# Patient Record
Sex: FEMALE | Race: WHITE | Employment: FULL TIME | URBAN - METROPOLITAN AREA
[De-identification: names, ages, dates, MRNs, and addresses within clinical notes are randomized per-mention and may not be internally consistent; named-entity substitution may affect disease eponyms.]

---

## 2017-01-03 ENCOUNTER — TELEPHONE (OUTPATIENT)
Dept: FAMILY MEDICINE | Facility: OTHER | Age: 42
End: 2017-01-03

## 2017-01-03 DIAGNOSIS — E03.0 CONGENITAL HYPOTHYROIDISM WITH DIFFUSE GOITER: Primary | ICD-10-CM

## 2017-01-03 NOTE — TELEPHONE ENCOUNTER
levothyroxine     Last Written Prescription Date: 10/11/2016  Last Quantity: 90, # refills: 0  Last Office Visit with G, P or [facility] prescribing provider: 11/19/2015        TSH   Date Value Ref Range Status   11/19/2015 3.60 0.40 - 4.00 mU/L Marisabel Durham MA

## 2017-01-05 RX ORDER — LEVOTHYROXINE SODIUM 100 UG/1
TABLET ORAL
Qty: 90 TABLET | OUTPATIENT
Start: 2017-01-05

## 2017-01-05 NOTE — TELEPHONE ENCOUNTER
Patient has pills through 1/10/17. Patient is due for OV and labs. Please see if patient can schedule prior to. If not, 30 day supply abdoulaye refill can be sent, but must be seen and have labs prior to further refills. Close when complete or flag for RN if abdoulaye refill is needed.    Yancy Dempsey, RN, BSN

## 2017-01-06 NOTE — TELEPHONE ENCOUNTER
Pt states she is not due for this refill yes, she is unsure why we received it. She was informed that she is due for an appt and labs. Will call back to schedule.   Thank you,  Carrie Carroll- Pt Rep.

## 2017-01-23 NOTE — PROGRESS NOTES
SUBJECTIVE:                                                    Amanda Pittman is a 41 year old female who presents to clinic today for the following health issues:      Hypothyroidism Follow-up      Since last visit, patient describes the following symptoms: Weight stable, no hair loss, no skin changes, no constipation, no loose stools   Doing well with this.  No symptoms of issues with this.    Joint Pain     Onset: three months     Description:   Location: left hip  Character: Sharp, Dull ache and Burning    Intensity: 7/10    Progression of Symptoms: worse, constant     Accompanying Signs & Symptoms:  Other symptoms: none   History:   Previous similar pain: No       Precipitating factors:   Trauma or overuse: no     Alleviating factors:  Improved by: rest/inactivity       Therapies Tried and outcome: ice, ibuprofen     Pt notes left hip pain since the beginning of November.  Will wax and wane, but never completely goes away.  Denies trauma.  Hasn't tried head.  Icing hasn't clearly helped.  Does gets some benefit from ibuprofen.  Pain is in lateral hip area, not worsening with walking.  Just gets worse with sitting.      Has had a stressful year with her mother's illness and death.        Amount of exercise or daily activities, outside of work: 2 day(s) per week    Problems taking medications regularly No    Medication side effects: none    Diet: regular (no restrictions)    Social History   Substance Use Topics     Smoking status: Never Smoker      Smokeless tobacco: Never Used      Comment: used to smoke occ     Alcohol Use: No        Problem list and histories reviewed & adjusted, as indicated.  Additional history: as documented    Current Outpatient Prescriptions   Medication Sig Dispense Refill     levothyroxine (SYNTHROID,LEVOTHROID) 100 MCG tablet Take 1 tablet (100 mcg) by mouth daily Due for labs for further refills 90 tablet 0     ibuprofen (ADVIL,MOTRIN) 800 MG tablet Take 1 tablet (800 mg) by mouth  "every 8 hours as needed for moderate pain 90 tablet 1     Recent Labs   Lab Test  11/19/15   1528  10/14/14   1533   03/31/11   0848   09/03/09   1136   LDL  71   --    --   98   --    --    HDL  48*   --    --   50   --    --    TRIG  178*   --    --   72   --    --    ALT  17   --    --    --    --    --    CR  0.75   --    --    --    --   1.02   GFRESTIMATED  85   --    --    --    --   62   GFRESTBLACK  >90   GFR Calc     --    --    --    --   75   POTASSIUM  4.5   --    --    --    --   4.3   TSH  3.60  0.92   < >   --    < >  2.72    < > = values in this interval not displayed.      BP Readings from Last 3 Encounters:   01/25/17 120/82   11/19/15 116/60   11/12/15 115/75    Wt Readings from Last 3 Encounters:   01/25/17 216 lb 6.4 oz (98.158 kg)   11/19/15 223 lb 9.6 oz (101.424 kg)   11/12/15 217 lb (98.431 kg)                  ROS:  Constitutional, HEENT, cardiovascular, pulmonary, gi and gu systems are negative, except as otherwise noted.    OBJECTIVE:                                                    /82 mmHg  Pulse 78  Temp(Src) 98.1  F (36.7  C) (Temporal)  Resp 16  Ht 5' 5.95\" (1.675 m)  Wt 216 lb 6.4 oz (98.158 kg)  BMI 34.99 kg/m2  LMP 01/20/2017  Breastfeeding? No  Body mass index is 34.99 kg/(m^2).  GENERAL: healthy, alert and no distress  NECK: no adenopathy, no asymmetry, masses, or scars and thyroid normal to palpation  RESP: lungs clear to auscultation - no rales, rhonchi or wheezes  CV: regular rate and rhythm, normal S1 S2, no S3 or S4, no murmur, click or rub, no peripheral edema and peripheral pulses strong  ABDOMEN: soft, nontender, no hepatosplenomegaly, no masses and bowel sounds normal  MS: tenderness near iliac crest.  Mild tenderness near trochanteric bursa.      Diagnostic Test Results:  Results for orders placed or performed in visit on 11/19/15   LIPID REFLEX TO DIRECT LDL PANEL   Result Value Ref Range    Cholesterol 155 <200 mg/dL    Triglycerides " "178 (H) <150 mg/dL    HDL Cholesterol 48 (L) >49 mg/dL    LDL Cholesterol Calculated 71 <100 mg/dL    Non HDL Cholesterol 107 <130 mg/dL   TSH with free T4 reflex   Result Value Ref Range    TSH 3.60 0.40 - 4.00 mU/L   Comprehensive metabolic panel   Result Value Ref Range    Sodium 139 133 - 144 mmol/L    Potassium 4.5 3.4 - 5.3 mmol/L    Chloride 104 94 - 109 mmol/L    Carbon Dioxide 27 20 - 32 mmol/L    Anion Gap 8 3 - 14 mmol/L    Glucose 95 70 - 99 mg/dL    Urea Nitrogen 13 7 - 30 mg/dL    Creatinine 0.75 0.52 - 1.04 mg/dL    GFR Estimate 85 >60 mL/min/1.7m2    GFR Estimate If Black >90   GFR Calc   >60 mL/min/1.7m2    Calcium 8.5 8.5 - 10.1 mg/dL    Bilirubin Total 0.4 0.2 - 1.3 mg/dL    Albumin 3.9 3.4 - 5.0 g/dL    Protein Total 7.2 6.8 - 8.8 g/dL    Alkaline Phosphatase 76 40 - 150 U/L    ALT 17 0 - 50 U/L    AST 22 0 - 45 U/L        ASSESSMENT/PLAN:                                                      BMI:   Estimated body mass index is 34.99 kg/(m^2) as calculated from the following:    Height as of this encounter: 5' 5.95\" (1.675 m).    Weight as of this encounter: 216 lb 6.4 oz (98.158 kg).   Weight management plan: Discussed healthy diet and exercise guidelines and patient will follow up in 12 months in clinic to re-evaluate.        ICD-10-CM    1. Acquired hypothyroidism E03.9 TSH with free T4 reflex     levothyroxine (SYNTHROID/LEVOTHROID) 100 MCG tablet   2. Lateral pain of left hip M25.552 PHYSICAL THERAPY REFERRAL   3. Chronic midline low back pain without sciatica M54.5 PHYSICAL THERAPY REFERRAL    G89.29    4. Adjustment disorder with depressed mood F43.21    5. Visit for screening mammogram Z12.31 *MA Screening Digital Bilateral     1.  Currently Controlled.  Continue current regimen.  Check labs.  Call/return if any problems or questions arise.   2,3.  Unclear what's causing her hip pain, but I suspect it's referred from her back.  Discussed PT or sports medicine referral.  " Will try PT initially.  Follow up if not improving.  4.  Still clearly grieving for her mother.  Offered medication to help deal with symptoms.  Pt declined.  Stressed importance of ensuring that she's coping well enough to do what she needs to on a daily basis.  Otherwise, need to be concerned at this point.  5.  Ordered.          Patient Instructions   Thank you for visiting [facility]    We'll let you know your lab results as soon as we can.     Try a week of high-dose ibuprofen to see if that will fix your hip.  If not, let's give PT a trial.  We can also have you visit with sports medicine or consider injections into the area.      If you feel your grieving is affecting your ability to do the things you need to, we should talk about treatments.    Please see me in 1-12  months for follow up, sooner if needed.      You are due for a screening Mammogram.  Please contact the Diagnostics Registration Department at: [phone] to schedule this appointment.       If you had imaging scheduled please refer to your radiology prep sheet.    Appointment    Date_______________     Time_____________    Day:   M TU W TH F    With____________________________    Location_________________________    If you need medication refills, please contact your pharmacy 3 days before your prescriptions runs out. If you are out of refills, your pharmacy will contact contact the clinic.    Contact us or return if questions or concerns.     -Your Care Team:  MD Janet Conti PA-C Folake Falaki, MD Anoshirvan Mazhari, MD Kelly White, CNP    General information about your clinic      Clinic hours:     Lab hours:  Phone [phone]  Monday 7:30 am-7 pm    Monday 8:30 am-6:30 pm  Tuesday-Friday 7:30 am-5 pm   Tuesday-Friday 8:30 am-4:30 pm    Pharmacy hours:  Phone [phone]  Monday 8:30 am-7pm  Tuesday-Friday 8:30am-6 pm                                       Mychart assistance  [phone]        We would like to hear from you, how was your visit today?    Shira Live  Patient Information Supervisor   Patient Care Supervisor  [facility] [place], and [facility], [place], and [facility]  [phone]           Sonny Warner MD, MD  [facility]

## 2017-01-25 ENCOUNTER — OFFICE VISIT (OUTPATIENT)
Dept: FAMILY MEDICINE | Facility: OTHER | Age: 42
End: 2017-01-25
Payer: COMMERCIAL

## 2017-01-25 VITALS
SYSTOLIC BLOOD PRESSURE: 120 MMHG | TEMPERATURE: 98.1 F | HEART RATE: 78 BPM | RESPIRATION RATE: 16 BRPM | HEIGHT: 66 IN | DIASTOLIC BLOOD PRESSURE: 82 MMHG | WEIGHT: 216.4 LBS | BODY MASS INDEX: 34.78 KG/M2

## 2017-01-25 DIAGNOSIS — G89.29 CHRONIC MIDLINE LOW BACK PAIN WITHOUT SCIATICA: ICD-10-CM

## 2017-01-25 DIAGNOSIS — E03.9 ACQUIRED HYPOTHYROIDISM: Primary | ICD-10-CM

## 2017-01-25 DIAGNOSIS — F43.21 ADJUSTMENT DISORDER WITH DEPRESSED MOOD: ICD-10-CM

## 2017-01-25 DIAGNOSIS — M25.552 LATERAL PAIN OF LEFT HIP: ICD-10-CM

## 2017-01-25 DIAGNOSIS — Z12.31 VISIT FOR SCREENING MAMMOGRAM: ICD-10-CM

## 2017-01-25 DIAGNOSIS — M54.50 CHRONIC MIDLINE LOW BACK PAIN WITHOUT SCIATICA: ICD-10-CM

## 2017-01-25 LAB — TSH SERPL DL<=0.005 MIU/L-ACNC: 1.17 MU/L (ref 0.4–4)

## 2017-01-25 PROCEDURE — 99214 OFFICE O/P EST MOD 30 MIN: CPT | Performed by: FAMILY MEDICINE

## 2017-01-25 PROCEDURE — 84443 ASSAY THYROID STIM HORMONE: CPT | Performed by: FAMILY MEDICINE

## 2017-01-25 PROCEDURE — 36415 COLL VENOUS BLD VENIPUNCTURE: CPT | Performed by: FAMILY MEDICINE

## 2017-01-25 RX ORDER — LEVOTHYROXINE SODIUM 100 UG/1
100 TABLET ORAL DAILY
Qty: 90 TABLET | Refills: 3 | Status: SHIPPED | OUTPATIENT
Start: 2017-01-25 | End: 2018-01-26

## 2017-01-25 ASSESSMENT — PAIN SCALES - GENERAL: PAINLEVEL: SEVERE PAIN (7)

## 2017-01-25 NOTE — PATIENT INSTRUCTIONS
Thank you for visiting [facility]    We'll let you know your lab results as soon as we can.     Try a week of high-dose ibuprofen to see if that will fix your hip.  If not, let's give PT a trial.  We can also have you visit with sports medicine or consider injections into the area.      If you feel your grieving is affecting your ability to do the things you need to, we should talk about treatments.    Please see me in 1-12  months for follow up, sooner if needed.      You are due for a screening Mammogram.  Please contact the Diagnostics Registration Department at: [phone] to schedule this appointment.       If you had imaging scheduled please refer to your radiology prep sheet.    Appointment    Date_______________     Time_____________    Day:   M TU W TH F    With____________________________    Location_________________________    If you need medication refills, please contact your pharmacy 3 days before your prescriptions runs out. If you are out of refills, your pharmacy will contact contact the clinic.    Contact us or return if questions or concerns.     -Your Care Team:  MD Janet Conti PA-C Folake Falaki, MD Anoshirvan Mazhari, MD Kelly White, ELIGIO    General information about your clinic      Clinic hours:     Lab hours:  Phone [phone]  Monday 7:30 am-7 pm    Monday 8:30 am-6:30 pm  Tuesday-Friday 7:30 am-5 pm   Tuesday-Friday 8:30 am-4:30 pm    Pharmacy hours:  Phone [phone]  Monday 8:30 am-7pm  Tuesday-Friday 8:30am-6 pm                                       Mychart assistance [phone]        We would like to hear from you, how was your visit today?    Shira Live  Patient Information Supervisor   Patient Care Supervisor  [place], and [facility] [place], and [facility]  [phone]

## 2017-01-25 NOTE — NURSING NOTE
"Chief Complaint   Patient presents with     Thyroid Problem     Panel Management     pap, tsh     Musculoskeletal Problem       Initial /82 mmHg  Pulse 78  Temp(Src) 98.1  F (36.7  C) (Temporal)  Resp 16  Ht 5' 5.95\" (1.675 m)  Wt 216 lb 6.4 oz (98.158 kg)  BMI 34.99 kg/m2  LMP 01/20/2017  Breastfeeding? No Estimated body mass index is 34.99 kg/(m^2) as calculated from the following:    Height as of this encounter: 5' 5.95\" (1.675 m).    Weight as of this encounter: 216 lb 6.4 oz (98.158 kg).  BP completed using cuff size: carrington Cordova LPN      "

## 2017-01-25 NOTE — MR AVS SNAPSHOT
After Visit Summary   1/25/2017    Amanda Pittman    MRN: [id number]           Patient Information     Date Of Birth          1975        Visit Information        Provider Department      1/25/2017 1:45 PM Sonny Warner MD [facility]        Today's Diagnoses     Acquired hypothyroidism    -  1     Lateral pain of left hip         Chronic midline low back pain without sciatica         Visit for screening mammogram           Care Instructions    Thank you for visiting [facility]    We'll let you know your lab results as soon as we can.     Try a week of high-dose ibuprofen to see if that will fix your hip.  If not, let's give PT a trial.  We can also have you visit with sports medicine or consider injections into the area.      If you feel your grieving is affecting your ability to do the things you need to, we should talk about treatments.    Please see me in 1-12  months for follow up, sooner if needed.      You are due for a screening Mammogram.  Please contact the Diagnostics Registration Department at: [phone] to schedule this appointment.       If you had imaging scheduled please refer to your radiology prep sheet.    Appointment    Date_______________     Time_____________    Day:   M TU W TH F    With____________________________    Location_________________________    If you need medication refills, please contact your pharmacy 3 days before your prescriptions runs out. If you are out of refills, your pharmacy will contact contact the clinic.    Contact us or return if questions or concerns.     -Your Care Team:  MD Janet Conti PA-C Folake Falaki, MD Anoshirvan Mazhari, MD Kelly White, CNP    General information about your clinic      Clinic hours:     Lab hours:  Phone [phone]  Monday 7:30 am-7 pm    Monday 8:30 am-6:30 pm  Tuesday-Friday 7:30 am-5 pm   Tuesday-Friday 8:30 am-4:30 pm    Pharmacy  hours:  Phone [phone]  Monday 8:30 am-7pm  Tuesday-Friday 8:30am-6 pm                                       Julia assistance [phone]        We would like to hear from you, how was your visit today?    Shira Live  Patient Information Supervisor   Patient Care Supervisor  [facility], and [facility]  [phone]           Follow-ups after your visit        Additional Services     PHYSICAL THERAPY REFERRAL       *This therapy referral will be filtered to a centralized scheduling office at [facility] and the patient will receive a call to schedule an appointment at a [place] location most convenient for them. *     [facility] provides Physical Therapy evaluation and treatment and many specialty services across the [place] system.  If requesting a specialty program, please choose from the list below.    If you have not heard from the scheduling office within 2 business days, please call [phone] for all locations, with the exception of [place], please call [phone].  Treatment: Evaluation & Treatment  Special Instructions/Modalities: eval and treat  Special Programs: as indicated    Please be aware that coverage of these services is subject to the terms and limitations of your health insurance plan.  Call member services at your health plan with any benefit or coverage questions.      **Note to Provider:  If you are referring outside of [facility] for the therapy appointment, please list the name of the location in the  special instructions  above, print the referral and give to the patient to schedule the appointment.                  Future tests that were ordered for you today     Open Future Orders        Priority Expected Expires Ordered    *MA Screening Digital Bilateral Routine  1/25/2018 1/25/2017            Who to contact     If you have questions or  "need follow up information about today's clinic visit or your schedule please contact [facility] directly at [phone].  Normal or non-critical lab and imaging results will be communicated to you by MyChart, letter or phone within 4 business days after the clinic has received the results. If you do not hear from us within 7 days, please contact the clinic through [email or web address] or phone. If you have a critical or abnormal lab result, we will notify you by phone as soon as possible.  Submit refill requests through [employer] or call your pharmacy and they will forward the refill request to us. Please allow 3 business days for your refill to be completed.          Additional Information About Your Visit        [email or web address] Information     [employer] gives you secure access to your electronic health record. If you see a primary care provider, you can also send messages to your care team and make appointments. If you have questions, please call your primary care clinic.  If you do not have a primary care provider, please call [phone] and they will assist you.        Care EveryWhere ID     This is your Care EveryWhere ID. This could be used by other organizations to access your [place] medical records  [id number]        Your Vitals Were     Pulse Temperature Respirations    78 98.1  F (36.7  C) (Temporal) 16    Height BMI (Body Mass Index) Last Period    5' 5.95\" (1.675 m) 34.99 kg/m2 01/20/2017    Breastfeeding?          No         Blood Pressure from Last 3 Encounters:   01/25/17 120/82   11/19/15 116/60   11/12/15 115/75    Weight from Last 3 Encounters:   01/25/17 216 lb 6.4 oz (98.158 kg)   11/19/15 223 lb 9.6 oz (101.424 kg)   11/12/15 217 lb (98.431 kg)              We Performed the Following     PHYSICAL THERAPY REFERRAL     TSH with free T4 reflex          Where to get your medicines      These medications were sent to [facility] - [place], [place] - [address] [place] [address] " [address], [facility] [address]     Phone:  [phone]    - levothyroxine 100 MCG tablet       Primary Care Provider Office Phone # Fax #    Gaina Galindo [phone]       [facility] [address] [place] [address]        Thank you!     Thank you for choosing [facility]  for your care. Our goal is always to provide you with excellent care. Hearing back from our patients is one way we can continue to improve our services. Please take a few minutes to complete the written survey that you may receive in the mail after your visit with us. Thank you!             Your Updated Medication List - Protect others around you: Learn how to safely use, store and throw away your medicines at www.disposemymeds.org.          This list is accurate as of: 1/25/17  2:13 PM.  Always use your most recent med list.                   Brand Name Dispense Instructions for use    ibuprofen 800 MG tablet    ADVIL/MOTRIN    90 tablet    Take 1 tablet (800 mg) by mouth every 8 hours as needed for moderate pain       levothyroxine 100 MCG tablet    SYNTHROID/LEVOTHROID    90 tablet    Take 1 tablet (100 mcg) by mouth daily Due for labs for further refills

## 2017-01-25 NOTE — PROGRESS NOTES
Quick Note:    Amanda,    All of your labs were normal for you.    Have a nice day!    Dr. Warner     ______

## 2017-01-26 ASSESSMENT — PATIENT HEALTH QUESTIONNAIRE - PHQ9: SUM OF ALL RESPONSES TO PHQ QUESTIONS 1-9: 12

## 2017-04-06 ENCOUNTER — HOSPITAL ENCOUNTER (OUTPATIENT)
Dept: MAMMOGRAPHY | Facility: CLINIC | Age: 42
Discharge: HOME | End: 2017-04-06
Attending: FAMILY MEDICINE | Admitting: FAMILY MEDICINE
Payer: COMMERCIAL

## 2017-04-06 DIAGNOSIS — Z12.31 VISIT FOR SCREENING MAMMOGRAM: ICD-10-CM

## 2017-04-06 PROCEDURE — G0202 SCR MAMMO BI INCL CAD: HCPCS

## 2018-01-26 DIAGNOSIS — E03.9 ACQUIRED HYPOTHYROIDISM: ICD-10-CM

## 2018-01-26 NOTE — LETTER
Amanda Pittman  [address] [address] [address]    January 31, 2018      Dear Amanda Pittman    APPOINTMENT REMINDER:   Our records indicates that it is time for you to be seen for a recheck.     Your current medication request will be approved for one refill but you will need to be seen before any additional refills can be approved.  Taking care of your health is important to us, and ongoing visits with your provider are vital to your care.    We look forward to seeing you in the near future.  You may call our office at [phone] to schedule a visit.     Please disregard this notice if you have already made an appointment.      Sincerely,    [facility] Team     [facility] [place]  [address] [address] [address]  Phone: [phone]

## 2018-01-29 RX ORDER — LEVOTHYROXINE SODIUM 100 UG/1
TABLET ORAL
Qty: 30 TABLET | Refills: 0 | Status: SHIPPED | OUTPATIENT
Start: 2018-01-29 | End: 2018-02-28

## 2018-01-29 NOTE — TELEPHONE ENCOUNTER
"Requested Prescriptions   Pending Prescriptions Disp Refills     levothyroxine (SYNTHROID/LEVOTHROID) 100 MCG tablet [Pharmacy Med Name: MYLAN-LEVOTHYROX 0.1MG TABLET] 90 tablet      Sig: TAKE 1 TABLET BY MOUTH  DAILY.  DUE FOR LABS FOR  FURTHER REFILLS    Thyroid Protocol Failed    1/26/2018  8:16 PM       Failed - Recent or future visit with authorizing provider's specialty    Patient had office visit in the last year or has a visit in the next 30 days with authorizing provider.  See \"Patient Info\" tab in inbasket, or \"Choose Columns\" in Meds & Orders section of the refill encounter.            Failed - Normal TSH on file in past 12 months    Recent Labs   Lab Test  01/25/17   1416   TSH  1.17             Passed - Patient is 12 years or older       Passed - No active pregnancy on record    If patient is pregnant or has had a positive pregnancy test, please check TSH.         Passed - No positive pregnancy test in past 12 months    If patient is pregnant or has had a positive pregnancy test, please check TSH.          Last ov 01/25/2017    Medication is being filled for 1 time refill only due to:  Patient needs to be seen because it has been more than one year since last visit.     Please call and help schedule.  Shree Musa, RN, BSN          "

## 2018-01-30 NOTE — TELEPHONE ENCOUNTER
Left message for patient to return call.       Medication is being filled for 1 time refill only due to:  Patient needs to be seen because it has been more than one year since last visit.
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
## 2018-02-28 ENCOUNTER — OFFICE VISIT (OUTPATIENT)
Dept: FAMILY MEDICINE | Facility: OTHER | Age: 43
End: 2018-02-28
Payer: COMMERCIAL

## 2018-02-28 VITALS
TEMPERATURE: 97.9 F | HEIGHT: 66 IN | BODY MASS INDEX: 35.41 KG/M2 | SYSTOLIC BLOOD PRESSURE: 104 MMHG | DIASTOLIC BLOOD PRESSURE: 70 MMHG | HEART RATE: 84 BPM | RESPIRATION RATE: 16 BRPM | WEIGHT: 220.3 LBS

## 2018-02-28 DIAGNOSIS — R20.9 DISTURBANCE OF SKIN SENSATION: ICD-10-CM

## 2018-02-28 DIAGNOSIS — E03.9 ACQUIRED HYPOTHYROIDISM: Primary | ICD-10-CM

## 2018-02-28 DIAGNOSIS — G56.01 CARPAL TUNNEL SYNDROME OF RIGHT WRIST: ICD-10-CM

## 2018-02-28 LAB
GLUCOSE SERPL-MCNC: 101 MG/DL (ref 70–99)
TSH SERPL DL<=0.005 MIU/L-ACNC: 1.02 MU/L (ref 0.4–4)

## 2018-02-28 PROCEDURE — 99214 OFFICE O/P EST MOD 30 MIN: CPT | Performed by: FAMILY MEDICINE

## 2018-02-28 PROCEDURE — 36415 COLL VENOUS BLD VENIPUNCTURE: CPT | Performed by: FAMILY MEDICINE

## 2018-02-28 PROCEDURE — 84443 ASSAY THYROID STIM HORMONE: CPT | Performed by: FAMILY MEDICINE

## 2018-02-28 PROCEDURE — 82947 ASSAY GLUCOSE BLOOD QUANT: CPT | Performed by: FAMILY MEDICINE

## 2018-02-28 RX ORDER — LEVOTHYROXINE SODIUM 100 UG/1
TABLET ORAL
Qty: 90 TABLET | Refills: 3 | Status: SHIPPED | OUTPATIENT
Start: 2018-02-28 | End: 2019-02-04

## 2018-02-28 ASSESSMENT — PAIN SCALES - GENERAL: PAINLEVEL: NO PAIN (0)

## 2018-02-28 NOTE — NURSING NOTE
"Chief Complaint   Patient presents with     Blood Draw     thyroid     Musculoskeletal Problem     Panel Management     flu shot, tsh       Initial /70 (BP Location: Left arm, Patient Position: Chair, Cuff Size: Adult Large)  Pulse 84  Temp 97.9  F (36.6  C) (Temporal)  Resp 16  Ht 5' 5.5\" (1.664 m)  Wt 220 lb 4.8 oz (99.9 kg)  LMP 02/18/2018  BMI 36.1 kg/m2 Estimated body mass index is 36.1 kg/(m^2) as calculated from the following:    Height as of this encounter: 5' 5.5\" (1.664 m).    Weight as of this encounter: 220 lb 4.8 oz (99.9 kg).  Medication Reconciliation: complete  Chase Frederick, CMA    "

## 2018-02-28 NOTE — MR AVS SNAPSHOT
After Visit Summary   2/28/2018    Amanda Pittman    MRN: [id number]           Patient Information     Date Of Birth          1975        Visit Information        Provider Department      2/28/2018 11:00 AM Sonny Warner MD [facility]        Today's Diagnoses     Acquired hypothyroidism    -  1    Carpal tunnel syndrome of right wrist        Disturbance of skin sensation          Care Instructions    Thank you for visiting [facility]    We'll let you know your lab results as soon as we can.     Wear the wrist splint at night to help with your pain.  Can use tylenol or ibuprofen.  Splint can help in the daytime too if desired.    If not improving, we can discuss medications, injections, or surgery.    Please see me in 1-12 months for follow up, depending on how you respond to treatment.    If you had imaging scheduled please refer to your radiology prep sheet.    Appointment    Date_______________     Time_____________    Day:   M TU W TH F    With____________________________    Location_________________________    If you need medication refills, please contact your pharmacy 3 days before your prescriptions runs out. If you are out of refills, your pharmacy will contact contact the clinic.    Contact us or return if questions or concerns.     -Your Care Team:  MD Janet Conti PA-C Joel De Haan, PA-C Elizabeth McLean, APRN CNP    General information about your clinic      Clinic hours:     Lab hours:  Phone [phone]  Monday 7:30 am-7 pm    Monday 8:30 am-6:30 pm  Tuesday-Friday 7:30 am-5 pm   Tuesday-Friday 8:30 am-4:30 pm    Pharmacy hours:  Phone [phone]  Monday 8:30 am-7pm  Tuesday-Friday 8:30am-6 pm                                       Mychart assistance [phone]        We would like to hear from you, how was your visit today?    Shira Live  Patient Information Supervisor   Patient Care  "Supervisor  [facility], and [facility], and [facility]  [phone]             Follow-ups after your visit        Who to contact     If you have questions or need follow up information about today's clinic visit or your schedule please contact [facility] directly at [phone].  Normal or non-critical lab and imaging results will be communicated to you by [employer], letter or phone within 4 business days after the clinic has received the results. If you do not hear from us within 7 days, please contact the clinic through "[employer]t or phone. If you have a critical or abnormal lab result, we will notify you by phone as soon as possible.  Submit refill requests through [employer] or call your pharmacy and they will forward the refill request to us. Please allow 3 business days for your refill to be completed.          Additional Information About Your Visit        [employer] Information     [employer] gives you secure access to your electronic health record. If you see a primary care provider, you can also send messages to your care team and make appointments. If you have questions, please call your primary care clinic.  If you do not have a primary care provider, please call [phone] and they will assist you.        Care EveryWhere ID     This is your Care EveryWhere ID. This could be used by other organizations to access your [place] medical records  [id number]        Your Vitals Were     Pulse Temperature Respirations Height Last Period BMI (Body Mass Index)    84 97.9  F (36.6  C) (Temporal) 16 5' 5.5\" (1.664 m) 02/18/2018 36.1 kg/m2       Blood Pressure from Last 3 Encounters:   02/28/18 104/70   01/25/17 120/82   11/19/15 116/60    Weight from Last 3 Encounters:   02/28/18 220 lb 4.8 oz (99.9 kg)   01/25/17 216 lb 6.4 oz (98.2 kg)   11/19/15 223 lb 9.6 oz (101.4 kg)              We Performed the Following     Glucose     JUST IN CASE     " TSH with free T4 reflex          Today's Medication Changes          These changes are accurate as of 2/28/18 11:48 AM.  If you have any questions, ask your nurse or doctor.               These medicines have changed or have updated prescriptions.        Dose/Directions    levothyroxine 100 MCG tablet   Commonly known as:  SYNTHROID/LEVOTHROID   This may have changed:  See the new instructions.   Used for:  Acquired hypothyroidism   Changed by:  Sonny Warner MD        TAKE 1 TABLET BY MOUTH  DAILY.  DUE FOR LABS FOR  FURTHER REFILLS   Quantity:  90 tablet   Refills:  3            Where to get your medicines      These medications were sent to [facility] - [address], [facility] [address]     Phone:  [phone]     levothyroxine 100 MCG tablet                Primary Care Provider Office Phone # Fax #    Sonny Warner [phone] [address] [place] [address]        Equal Access to Services     [facility]: Hadii edie ku hadasho Soomaali, waaxda luqadaha, qaybta kaalmada adeegyada, waxay mamein hayramann raine robledo . So [facility] [phone].    ATENCIÓN: Si habla español, tiene a bean disposición servicios gratuitos de asistencia lingüística. [facility] [phone].    We comply with applicable federal civil rights laws and [place] laws. We do not discriminate on the basis of race, color, national origin, age, disability, sex, sexual orientation, or gender identity.            Thank you!     Thank you for choosing [facility]  for your care. Our goal is always to provide you with excellent care. Hearing back from our patients is one way we can continue to improve our services. Please take a few minutes to complete the written survey that you may receive in the mail after your visit with us. Thank you!             Your Updated Medication List - Protect others around you: Learn how to  safely use, store and throw away your medicines at www.disposemymeds.org.          This list is accurate as of 2/28/18 11:48 AM.  Always use your most recent med list.                   Brand Name Dispense Instructions for use Diagnosis    ibuprofen 800 MG tablet    ADVIL/MOTRIN    90 tablet    Take 1 tablet (800 mg) by mouth every 8 hours as needed for moderate pain    S/P caesarean section       levothyroxine 100 MCG tablet    SYNTHROID/LEVOTHROID    90 tablet    TAKE 1 TABLET BY MOUTH  DAILY.  DUE FOR LABS FOR  FURTHER REFILLS    Acquired hypothyroidism

## 2018-02-28 NOTE — PATIENT INSTRUCTIONS
Thank you for visiting [facility]    We'll let you know your lab results as soon as we can.     Wear the wrist splint at night to help with your pain.  Can use tylenol or ibuprofen.  Splint can help in the daytime too if desired.    If not improving, we can discuss medications, injections, or surgery.    Please see me in 1-12 months for follow up, depending on how you respond to treatment.    If you had imaging scheduled please refer to your radiology prep sheet.    Appointment    Date_______________     Time_____________    Day:   M TU W TH F    With____________________________    Location_________________________    If you need medication refills, please contact your pharmacy 3 days before your prescriptions runs out. If you are out of refills, your pharmacy will contact contact the clinic.    Contact us or return if questions or concerns.     -Your Care Team:  MD Janet Conti PA-C Joel De Haan, PA-C Elizabeth McLean, APRN CNP    General information about your clinic      Clinic hours:     Lab hours:  Phone [phone]  Monday 7:30 am-7 pm    Monday 8:30 am-6:30 pm  Tuesday-Friday 7:30 am-5 pm   Tuesday-Friday 8:30 am-4:30 pm    Pharmacy hours:  Phone [phone]  Monday 8:30 am-7pm  Tuesday-Friday 8:30am-6 pm                                       Mychart assistance [phone]        We would like to hear from you, how was your visit today?    Shira Live  Patient Information Supervisor   Patient Care Supervisor  [facility] [place], and \[facility]", and [facility]  [phone]

## 2018-03-01 NOTE — PROGRESS NOTES
Amanda,    All of your labs were normal for you, assuming you weren't fasting when these were drawn.    Have a nice day!    Dr. Warner

## 2018-05-15 ENCOUNTER — HOSPITAL ENCOUNTER (OUTPATIENT)
Dept: MAMMOGRAPHY | Facility: CLINIC | Age: 43
Discharge: HOME | End: 2018-05-15
Attending: FAMILY MEDICINE | Admitting: FAMILY MEDICINE
Payer: COMMERCIAL

## 2018-05-15 DIAGNOSIS — Z12.31 VISIT FOR SCREENING MAMMOGRAM: ICD-10-CM

## 2018-05-15 PROCEDURE — 77067 SCR MAMMO BI INCL CAD: CPT

## 2018-05-23 NOTE — PROGRESS NOTES
Amanda,    Just want to be sure that radiology got in touch with you about getting additional mammogram views to evaluate your risk.  Contact us or return if questions or concerns.     Have a nice day!    Dr. Warner

## 2018-06-01 ENCOUNTER — HOSPITAL ENCOUNTER (OUTPATIENT)
Dept: MAMMOGRAPHY | Facility: CLINIC | Age: 43
End: 2018-06-01
Attending: FAMILY MEDICINE
Payer: COMMERCIAL

## 2018-06-01 ENCOUNTER — HOSPITAL ENCOUNTER (OUTPATIENT)
Dept: ULTRASOUND IMAGING | Facility: CLINIC | Age: 43
Discharge: HOME | End: 2018-06-01
Attending: FAMILY MEDICINE | Admitting: FAMILY MEDICINE
Payer: COMMERCIAL

## 2018-06-01 DIAGNOSIS — R92.8 ABNORMAL MAMMOGRAM: ICD-10-CM

## 2018-06-01 PROCEDURE — 76642 ULTRASOUND BREAST LIMITED: CPT | Mod: 50

## 2018-06-01 PROCEDURE — 77066 DX MAMMO INCL CAD BI: CPT

## 2018-08-23 NOTE — PROGRESS NOTES
"  SUBJECTIVE:   Amanda Pittman is a 42 year old female who presents to clinic today for the following health issues:      Follow up carpal tunnel    HPI  Joint Pain    Onset: Saturday 08/25/18    Description:   Location: left elbow  Character: Sharp    Intensity: moderate    Progression of Symptoms: same    Accompanying Signs & Symptoms:  Other symptoms: none    History:   Previous similar pain: no       Precipitating factors:   Trauma or overuse: no     Alleviating factors:  Improved by: ibuprofen    Therapies Tried and outcome: stretch, massage, ibuprofen    Her carpal tunnel is doing well with splints in place.  Only wearing at night currently.     Feels like she her arm is \"dead\" when severe.         Problem list and histories reviewed & adjusted, as indicated.  Additional history: as documented      Current Outpatient Prescriptions   Medication Sig Dispense Refill     ibuprofen (ADVIL,MOTRIN) 800 MG tablet Take 1 tablet (800 mg) by mouth every 8 hours as needed for moderate pain 90 tablet 1     levothyroxine (SYNTHROID/LEVOTHROID) 100 MCG tablet TAKE 1 TABLET BY MOUTH  DAILY.  DUE FOR LABS FOR  FURTHER REFILLS 90 tablet 3     Recent Labs   Lab Test  02/28/18   1156  01/25/17   1416  11/19/15   1528   03/31/11   0848   LDL   --    --   71   --   98   HDL   --    --   48*   --   50   TRIG   --    --   178*   --   72   ALT   --    --   17   --    --    CR   --    --   0.75   --    --    GFRESTIMATED   --    --   85   --    --    GFRESTBLACK   --    --   >90   GFR Calc     --    --    POTASSIUM   --    --   4.5   --    --    TSH  1.02  1.17  3.60   < >   --     < > = values in this interval not displayed.      BP Readings from Last 3 Encounters:   08/29/18 104/70   02/28/18 104/70   01/25/17 120/82    Wt Readings from Last 3 Encounters:   08/29/18 222 lb 3.2 oz (100.8 kg)   02/28/18 220 lb 4.8 oz (99.9 kg)   01/25/17 216 lb 6.4 oz (98.2 kg)                  ROS:  Constitutional, HEENT, cardiovascular, " pulmonary, gi and gu systems are negative, except as otherwise noted.    OBJECTIVE:     /70 (BP Location: Left arm, Patient Position: Chair, Cuff Size: Adult Large)  Pulse 68  Temp 98.8  F (37.1  C) (Temporal)  Resp 16  Wt 222 lb 3.2 oz (100.8 kg)  LMP 08/18/2018  BMI 36.41 kg/m2  Body mass index is 36.41 kg/(m^2).  GENERAL: healthy, alert and no distress  NECK: no adenopathy, no asymmetry, masses, or scars and thyroid normal to palpation  RESP: lungs clear to auscultation - no rales, rhonchi or wheezes  CV: regular rate and rhythm, normal S1 S2, no S3 or S4, no murmur, click or rub, no peripheral edema and peripheral pulses strong  ABDOMEN: soft, nontender, no hepatosplenomegaly, no masses and bowel sounds normal  MS: Negative Tinel's, Phalen's.  Normal strength.  Some tenderness along posterior triceps to olecranon.  Weakness of flexion and extension of left elbow related to pain.    Diagnostic Test Results:  Results for orders placed or performed during the hospital encounter of 06/01/18   US Breast Bilateral Limited 1-3 Quadrants    Narrative    MAMMOGRAPHY DIAGNOSTIC BILATERAL W/ LORENE, DIGITAL;      TARGETED ULTRASOUND, LEFT BREAST - 6/1/2018 1:42 PM    HISTORY: Focal asymmetry inner aspect of the left breast on screening  CC view only.     COMPARISON:  Mammograms dated 5/15/2018, 4/6/2017, 11/13/2015 and  11/9/2015.    BREAST DENSITY: Heterogeneously dense.  Limiting mammographic sensitivity.    FINDINGS: Focal asymmetry inner aspect of left breast resolves on the  tomographic imaging.    Targeted ultrasound inner aspect of left breast demonstrates no  sonographic abnormality to suggest malignancy.       Impression    IMPRESSION: BI-RADS CATEGORY: 1 -  NEGATIVE. Finding on screening  mammogram is most consistent with summation artifact.    RECOMMENDED FOLLOW-UP: Annual Mammography. This patient may benefit  from 3-D screening mammography rather than 2-D screening mammography  given the findings of  "summation artifact in her breast.    I discussed the findings and recommendations with the patient at the  time of the exam.    JAYSON TRAYLOR MD       ASSESSMENT/PLAN:     BMI:   Estimated body mass index is 36.41 kg/(m^2) as calculated from the following:    Height as of 2/28/18: 5' 5.5\" (1.664 m).    Weight as of this encounter: 222 lb 3.2 oz (100.8 kg).   Weight management plan: Discussed healthy diet and exercise guidelines and patient will follow up in 6 months in clinic to re-evaluate.        ICD-10-CM    1. Bilateral carpal tunnel syndrome G56.03 NEUROLOGY ADULT REFERRAL   2. Left elbow pain M25.522      1.  Symptoms are somewhat suspicious for this, but exam is not convincing today.  She has had some mild benefit from cockup wrist splints, but continues to slightly worsened.  Will refer for EMG testing.  Depending on results of this, may need to refer to surgery for definitive care.  Can continue to use splints at night and as needed for specific activities.  Could try over-the-counter analgesics as well.    2.  Somewhat suspicious for tendinitis.  Discussed use of over-the-counter NSAIDs.  If not improving, consider additional evaluation.    Portions of this note were completed using Dragon dictation software.  Although reviewed, there may be typographical and other inadvertent errors that remain.               Patient Instructions   Thank you for visiting [facility]    Let's obtain an EMG to evaluate.  Assuming this shows carpal tunnel, we can get you in with a surgeon.      Try ibuprofen for your elbow.  Let me know if not improving.    Contact us or return if questions or concerns.     Please Follow Up when indicated on the section below this one on your After-Visit Summary.    If you had imaging scheduled please refer to your radiology prep sheet.    Appointment    Date_______________     Time_____________    Day:   M TU W TH " F    With____________________________    Location_________________________    If you need medication refills, please contact your pharmacy 3 days before your prescriptions runs out. If you are out of refills, your pharmacy will contact contact the clinic.    Contact us or return if questions or concerns.     -Your Care Team:  MD Janet Conti PA-C Joel De Haan, PA-C Elizabeth McLean, APRN CNP    General information about your clinic      Clinic hours:     Lab hours:  Phone [phone]  Monday 7:30 am-7 pm    Monday 8:30 am-6:30 pm  Tuesday-Friday 7:30 am-5 pm   Tuesday-Friday 8:30 am-4:30 pm    Pharmacy hours:  Phone [phone]  Monday 8:30 am-7pm  Tuesday-Friday 8:30am-6 pm                                       Mychart assistance [phone]        We would like to hear from you, how was your visit today?    Shira Live  Patient Information Supervisor   Patient Care Supervisor  [facility] [place], and [facility], and [facility]  [phone]         Sonny Warner MD, MD  [facility]

## 2018-08-29 ENCOUNTER — OFFICE VISIT (OUTPATIENT)
Dept: FAMILY MEDICINE | Facility: OTHER | Age: 43
End: 2018-08-29
Payer: COMMERCIAL

## 2018-08-29 VITALS
RESPIRATION RATE: 16 BRPM | TEMPERATURE: 98.8 F | WEIGHT: 222.2 LBS | SYSTOLIC BLOOD PRESSURE: 104 MMHG | BODY MASS INDEX: 36.41 KG/M2 | DIASTOLIC BLOOD PRESSURE: 70 MMHG | HEART RATE: 68 BPM

## 2018-08-29 DIAGNOSIS — G56.03 BILATERAL CARPAL TUNNEL SYNDROME: Primary | ICD-10-CM

## 2018-08-29 DIAGNOSIS — M25.522 LEFT ELBOW PAIN: ICD-10-CM

## 2018-08-29 PROCEDURE — 99214 OFFICE O/P EST MOD 30 MIN: CPT | Performed by: FAMILY MEDICINE

## 2018-08-29 ASSESSMENT — PAIN SCALES - GENERAL: PAINLEVEL: MILD PAIN (3)

## 2018-08-29 NOTE — MR AVS SNAPSHOT
After Visit Summary   8/29/2018    Amanda Pittman    MRN: [id number]           Patient Information     Date Of Birth          1975        Visit Information        Provider Department      8/29/2018 2:45 PM Sonny Warner MD [facility]        Today's Diagnoses     Bilateral carpal tunnel syndrome    -  1    Left elbow pain          Care Instructions    Thank you for visiting [facility]    Let's obtain an EMG to evaluate.  Assuming this shows carpal tunnel, we can get you in with a surgeon.      Try ibuprofen for your elbow.  Let me know if not improving.    Contact us or return if questions or concerns.     Please Follow Up when indicated on the section below this one on your After-Visit Summary.    If you had imaging scheduled please refer to your radiology prep sheet.    Appointment    Date_______________     Time_____________    Day:   M TU W TH F    With____________________________    Location_________________________    If you need medication refills, please contact your pharmacy 3 days before your prescriptions runs out. If you are out of refills, your pharmacy will contact contact the clinic.    Contact us or return if questions or concerns.     -Your Care Team:  MD Jnaet Conti PA-C Joel De Haan, PA-C Elizabeth McLean, CHARLIE DEL VALLE    General information about your clinic      Clinic hours:     Lab hours:  Phone [phone]  Monday 7:30 am-7 pm    Monday 8:30 am-6:30 pm  Tuesday-Friday 7:30 am-5 pm   Tuesday-Friday 8:30 am-4:30 pm    Pharmacy hours:  Phone [phone]  Monday 8:30 am-7pm  Tuesday-Friday 8:30am-6 pm                                       Mychart assistance [phone]        We would like to hear from you, how was your visit today?    Shira Live  Patient Information Supervisor   Patient Care Supervisor  [place], and Jignesh [facility] [place], and Jignesh  [facility]  [phone]             Follow-ups after your visit        Additional Services     NEUROLOGY ADULT REFERRAL       Your provider has referred you for the following:   EMG at [facility]: [facility] [place] [phone]   [email or web address]  [place] [phone]   [email or web address]    Please be aware that coverage of these services is subject to the terms and limitations of your health insurance plan.  Call member services at your health plan with any benefit or coverage questions.      Please bring the following with you to your appointment:    (1) Any X-Rays, CTs or MRIs which have been performed.  Contact the facility where they were done to arrange for  prior to your scheduled appointment.    (2) List of current medications  (3) This referral request   (4) Any documents/labs given to you for this referral                  Follow-up notes from your care team     Return in about 6 months (around 2/28/2019) for Recheck.      Who to contact     If you have questions or need follow up information about today's clinic visit or your schedule please contact [facility] directly at [phone].  Normal or non-critical lab and imaging results will be communicated to you by [employer] [facility], letter or phone within 4 business days after the clinic has received the results. If you do not hear from us within 7 days, please contact the clinic through [employer] [facility] or phone. If you have a critical or abnormal lab result, we will notify you by phone as soon as possible.  Submit refill requests through [employer] or call your pharmacy and they will forward the refill request to us. Please allow 3 business days for your refill to be completed.          Additional Information About Your Visit        [employer] Information     [employer] gives you secure access to your electronic health record.  If you see a primary care provider, you can also send messages to your care team and make appointments. If you have questions, please call your primary care clinic.  If you do not have a primary care provider, please call [phone] and they will assist you.        Care EveryWhere ID     This is your Care EveryWhere ID. This could be used by other organizations to access your [place] medical records  [id number]        Your Vitals Were     Pulse Temperature Respirations Last Period BMI (Body Mass Index)       68 98.8  F (37.1  C) (Temporal) 16 08/18/2018 36.41 kg/m2        Blood Pressure from Last 3 Encounters:   08/29/18 104/70   02/28/18 104/70   01/25/17 120/82    Weight from Last 3 Encounters:   08/29/18 222 lb 3.2 oz (100.8 kg)   02/28/18 220 lb 4.8 oz (99.9 kg)   01/25/17 216 lb 6.4 oz (98.2 kg)              We Performed the Following     NEUROLOGY ADULT REFERRAL        Primary Care Provider Office Phone # Fax #    Sonny Warner [phone] [address] [place] [address]        Equal Access to Services     [facility] AH: Hadii aad ku hadasho Soariannaali, waaxda luqadaha, qaybta kaalmada adeegyada, brian robledo ah. So [facility] [phone].    ATENCIÓN: Si habla español, tiene a bean disposición servicios gratuitos de asistencia lingüística. [facility] [phone].    We comply with applicable federal civil rights laws and [place] laws. We do not discriminate on the basis of race, color, national origin, age, disability, sex, sexual orientation, or gender identity.            Thank you!     Thank you for choosing [facility] [place]  for your care. Our goal is always to provide you with excellent care. Hearing back from our patients is one way we can continue to improve our services. Please take a few minutes to complete the written survey that you may receive in the mail after your visit with us. Thank you!             Your Updated Medication  List - Protect others around you: Learn how to safely use, store and throw away your medicines at www.disposemymeds.org.          This list is accurate as of 8/29/18  3:00 PM.  Always use your most recent med list.                   Brand Name Dispense Instructions for use Diagnosis    ibuprofen 800 MG tablet    ADVIL/MOTRIN    90 tablet    Take 1 tablet (800 mg) by mouth every 8 hours as needed for moderate pain    S/P caesarean section       levothyroxine 100 MCG tablet    SYNTHROID/LEVOTHROID    90 tablet    TAKE 1 TABLET BY MOUTH  DAILY.  DUE FOR LABS FOR  FURTHER REFILLS    Acquired hypothyroidism

## 2018-08-29 NOTE — PATIENT INSTRUCTIONS
Thank you for visiting [facility]    Let's obtain an EMG to evaluate.  Assuming this shows carpal tunnel, we can get you in with a surgeon.      Try ibuprofen for your elbow.  Let me know if not improving.    Contact us or return if questions or concerns.     Please Follow Up when indicated on the section below this one on your After-Visit Summary.    If you had imaging scheduled please refer to your radiology prep sheet.    Appointment    Date_______________     Time_____________    Day:   M TU W TH F    With____________________________    Location_________________________    If you need medication refills, please contact your pharmacy 3 days before your prescriptions runs out. If you are out of refills, your pharmacy will contact contact the clinic.    Contact us or return if questions or concerns.     -Your Care Team:  MD Janet Conti PA-C Joel De Haan, PA-C Elizabeth McLean, APRN CNP    General information about your clinic      Clinic hours:     Lab hours:  Phone [phone]  Monday 7:30 am-7 pm    Monday 8:30 am-6:30 pm  Tuesday-Friday 7:30 am-5 pm   Tuesday-Friday 8:30 am-4:30 pm    Pharmacy hours:  Phone [phone]  Monday 8:30 am-7pm  Tuesday-Friday 8:30am-6 pm                                       Mychart assistance [phone]        We would like to hear from you, how was your visit today?    Shira Live  Patient Information Supervisor   Patient Care Supervisor  [facility] [place], and [facility] [place], and [facility]  [phone]

## 2018-09-12 ENCOUNTER — MYC MEDICAL ADVICE (OUTPATIENT)
Dept: FAMILY MEDICINE | Facility: OTHER | Age: 43
End: 2018-09-12

## 2018-10-02 ENCOUNTER — TRANSFERRED RECORDS (OUTPATIENT)
Dept: HEALTH INFORMATION MANAGEMENT | Facility: CLINIC | Age: 43
End: 2018-10-02

## 2018-10-11 ENCOUNTER — MYC MEDICAL ADVICE (OUTPATIENT)
Dept: FAMILY MEDICINE | Facility: OTHER | Age: 43
End: 2018-10-11

## 2018-10-11 DIAGNOSIS — M25.532 PAIN IN BOTH WRISTS: Primary | ICD-10-CM

## 2018-10-11 DIAGNOSIS — M25.531 PAIN IN BOTH WRISTS: Primary | ICD-10-CM

## 2018-10-16 NOTE — TELEPHONE ENCOUNTER
Flagging for provider to review EMG results from 10/2 have now been scanned into chart.  Responded via Cierra [place] RT (R)

## 2018-10-19 ENCOUNTER — OFFICE VISIT (OUTPATIENT)
Dept: ORTHOPEDICS | Facility: OTHER | Age: 43
End: 2018-10-19
Payer: COMMERCIAL

## 2018-10-19 ENCOUNTER — RADIANT APPOINTMENT (OUTPATIENT)
Dept: GENERAL RADIOLOGY | Facility: OTHER | Age: 43
End: 2018-10-19
Attending: PHYSICAL MEDICINE & REHABILITATION
Payer: COMMERCIAL

## 2018-10-19 VITALS
HEIGHT: 66 IN | HEART RATE: 72 BPM | WEIGHT: 223.6 LBS | DIASTOLIC BLOOD PRESSURE: 77 MMHG | BODY MASS INDEX: 35.93 KG/M2 | SYSTOLIC BLOOD PRESSURE: 116 MMHG

## 2018-10-19 DIAGNOSIS — M65.4 DE QUERVAIN'S TENOSYNOVITIS, RIGHT: ICD-10-CM

## 2018-10-19 DIAGNOSIS — M25.531 RIGHT WRIST PAIN: ICD-10-CM

## 2018-10-19 DIAGNOSIS — M79.641 RIGHT HAND PAIN: ICD-10-CM

## 2018-10-19 DIAGNOSIS — M79.641 RIGHT HAND PAIN: Primary | ICD-10-CM

## 2018-10-19 PROCEDURE — 20550 NJX 1 TENDON SHEATH/LIGAMENT: CPT | Mod: RT | Performed by: PHYSICAL MEDICINE & REHABILITATION

## 2018-10-19 PROCEDURE — 99243 OFF/OP CNSLTJ NEW/EST LOW 30: CPT | Mod: 25 | Performed by: PHYSICAL MEDICINE & REHABILITATION

## 2018-10-19 PROCEDURE — 73130 X-RAY EXAM OF HAND: CPT | Mod: RT

## 2018-10-19 RX ORDER — TRIAMCINOLONE ACETONIDE 40 MG/ML
40 INJECTION, SUSPENSION INTRA-ARTICULAR; INTRAMUSCULAR
Status: DISCONTINUED | OUTPATIENT
Start: 2018-10-19 | End: 2019-05-09

## 2018-10-19 RX ADMIN — TRIAMCINOLONE ACETONIDE 40 MG: 40 INJECTION, SUSPENSION INTRA-ARTICULAR; INTRAMUSCULAR at 15:00

## 2018-10-19 NOTE — MR AVS SNAPSHOT
After Visit Summary   10/19/2018    Amanda Pittman    MRN: [id number]           Patient Information     Date Of Birth          1975        Visit Information        Provider Department      10/19/2018 2:00 PM Melany Munguia MD [facility]        Today's Diagnoses     Right hand pain    -  1    Right wrist pain          Care Instructions    -Steroid injection performed today.  Take it easy over the next few days. Keep in mind that the steroid may take up to 3 days to start working and up to 2 weeks to reach maximal effect.  Ice 15-20 minutes as needed for soreness.  Patient's preferred over the counter medication as needed for pain as directed on packaging.  -Ice or heat 15-20 minutes as needed (Avoid sleeping on a heating pad or ice)  -Over the counter lidocaine cream as needed (i.e. Aspercreme or generic equivalent)  -Bracing as needed for support and comfort.  -Avoid aggravating activities.    -We also discussed formal hand therapy.    -Follow up as needed if symptoms fail to improve or worsen.  Please call with questions or concerns.              Follow-ups after your visit        Who to contact     If you have questions or need follow up information about today's clinic visit or your schedule please contact [facility] directly at [phone].  Normal or non-critical lab and imaging results will be communicated to you by MyChart, letter or phone within 4 business days after the clinic has received the results. If you do not hear from us within 7 days, please contact the clinic through [employer] or phone. If you have a critical or abnormal lab result, we will notify you by phone as soon as possible.  Submit refill requests through "[employer] or call your pharmacy and they will forward the refill request to us. Please allow 3 business days for your refill to be completed.          Additional Information About Your Visit        MyChart Information     [employer]  "gives you secure access to your electronic health record. If you see a primary care provider, you can also send messages to your care team and make appointments. If you have questions, please call your primary care clinic.  If you do not have a primary care provider, please call [phone] and they will assist you.        Care EveryWhere ID     This is your Care EveryWhere ID. This could be used by other organizations to access your [place] medical records  [id number]        Your Vitals Were     Pulse Height BMI (Body Mass Index)             72 5' 5.5\" (1.664 m) 36.64 kg/m2          Blood Pressure from Last 3 Encounters:   10/19/18 116/77   08/29/18 104/70   02/28/18 104/70    Weight from Last 3 Encounters:   10/19/18 223 lb 9.6 oz (101.4 kg)   08/29/18 222 lb 3.2 oz (100.8 kg)   02/28/18 220 lb 4.8 oz (99.9 kg)               Primary Care Provider Office Phone # Fax #    Sonny Warner [phone] [address] [place] [address]        Equal Access to Services     [facility] AH: Hadii aad ku hadasho Soomaali, waaxda luqadaha, qaybta kaalmada adeegyada, brian vilchisin hayramann raine mendenhall lahudsonn ah. So [facility] [phone].    ATENCIÓN: Si habla español, tiene a bean disposición servicios gratuitos de asistencia lingüística. Llame al [phone].    We comply with applicable federal civil rights laws and [place] laws. We do not discriminate on the basis of race, color, national origin, age, disability, sex, sexual orientation, or gender identity.            Thank you!     Thank you for choosing [facility] [place]  for your care. Our goal is always to provide you with excellent care. Hearing back from our patients is one way we can continue to improve our services. Please take a few minutes to complete the written survey that you may receive in the mail after your visit with us. Thank you!             Your Updated Medication List - Protect others around you: Learn how to " safely use, store and throw away your medicines at www.disposemymeds.org.          This list is accurate as of 10/19/18  2:47 PM.  Always use your most recent med list.                   Brand Name Dispense Instructions for use Diagnosis    ibuprofen 800 MG tablet    ADVIL/MOTRIN    90 tablet    Take 1 tablet (800 mg) by mouth every 8 hours as needed for moderate pain    S/P caesarean section       levothyroxine 100 MCG tablet    SYNTHROID/LEVOTHROID    90 tablet    TAKE 1 TABLET BY MOUTH  DAILY.  DUE FOR LABS FOR  FURTHER REFILLS    Acquired hypothyroidism

## 2018-10-19 NOTE — LETTER
10/19/2018         RE: Amanda Pittman  [address]  [place] [address]        Dear Colleague,    Thank you for referring your patient, Amanda Pittman, to [facility]. Please see a copy of my visit note below.    Sports Medicine Clinic Visit    PCP: Sonny Warner    CC: Patient presents with:  Right Wrist - Pain      HPI:  Amanda Pittman is a 42 year old female who is seen in consultation at the request of Dr. Warner.   She notes right arm pain that began around the first of the year with insidious onset. She denies an injury.  She notes general aching of the forearm. She notes pain in the wrist and thumb as well. She rates the pain at a 5/10 at its worst and a 3/10 currently.  Symptoms are relieved with wrist bracing at night, ibuprofen. Symptoms are worsened by lifting, reaching back for a seatbelt, computer work, gripping and twisting. She endorses numbness and tingling over the medial side of the hand and thumb, weakness, and clicking.   She denies swelling, bruising, popping, grinding, catching, locking and instability.  Other treatment has included stretching and ice (feels good but does resolve the pain), and EMG completed on 10/2/18.       Review of Systems:  Musculoskeletal: as above  Remainder of review of systems is negative including constitutional, eyes, ENT, CV, pulmonary, GI, , endocrine, skin, hematologic, and neurologic except as noted in HPI or medical history.    History reviewed. No pertinent past surgical/medical/family/social history other than as mentioned in HPI.    Patient Active Problem List   Diagnosis     Hypothyroidism     Infertility management     Female infertility     CARDIOVASCULAR SCREENING; LDL GOAL LESS THAN 160     S/P LEEP (loop electrosurgical excision procedure)     History of cone biopsy of cervix complicating pregnancy     Status post  delivery     Past Medical History:   Diagnosis Date     Cancer (H)     Cervical      Screening for malignant neoplasm of the cervix     took most of cervix out. SANDY 2/3, CIS*     Thyroid disease      Past Surgical History:   Procedure Laterality Date     BIOPSY  2006    Colposcopy     C  DELIVERY ONLY  2003    , Low Cervical      SECTION N/A 2014    Procedure:  SECTION;  Surgeon: Max Merida MD;  Location: [facility] L+D     COLONOSCOPY       HC COLP CERVIX/UPPER VAGINA W BX CERVIX/ENDOCERV CURETT  2006    SANDY 2/3, CIS      HC MARSUP BARTHOLIN GLAND CYST      x4     HC REMOVE TONSILS/ADENOIDS,12+ Y/O      Tonsils 11+y.o. tonsilectomy     LEEP TX, CERVICAL  2006     TONSILLECTOMY  7    Right - sided     Family History   Problem Relation Age of Onset     Cancer Maternal Grandmother      uterus cancer     HEART DISEASE Paternal Grandfather       of MI     HEART DISEASE Maternal Grandfather      Cancer Mother 72     myeloma     Other Cancer Mother      Multiple myeloma      Social History     Social History     Marital status:      Spouse name: Greg     Number of children: 1     Years of education: N/A     Occupational History      Not Employed     Social History Main Topics     Smoking status: Never Smoker     Smokeless tobacco: Never Used      Comment: used to smoke occ     Alcohol use No     Drug use: No     Sexual activity: Yes     Partners: Male     Birth control/ protection: None     Other Topics Concern      Service No     Blood Transfusions No     Caffeine Concern Yes     Occupational Exposure Yes     [employer]exec. assist.     Hobby Hazards No     Sleep Concern No     Stress Concern No     Weight Concern No     Special Diet No     Back Care No     fall from horse - healed     Exercise Yes     Seat Belt Yes     Parent/Sibling W/ Cabg, Mi Or Angioplasty Before 65f 55m? No     Social History Narrative     to Greg  And lives in [place] with sonP[place]rez.  No smokers in the home.  No indoor cats/kittens.  " No concerns about domestic violence.         She works as an     Current Outpatient Prescriptions   Medication     levothyroxine (SYNTHROID/LEVOTHROID) 100 MCG tablet     ibuprofen (ADVIL,MOTRIN) 800 MG tablet     No current facility-administered medications for this visit.      Allergies   Allergen Reactions     [place] Other (See Comments)     Sores in mouth     Percocet [Oxycodone-Acetaminophen] Other (See Comments)     Tongue swelling and hives         Objective:  /77  Pulse 72  Ht 5' 5.5\" (1.664 m)  Wt 223 lb 9.6 oz (101.4 kg)  BMI 36.64 kg/m2    General: Alert and in no distress    Head: Normocephalic, atraumatic  Eyes: no scleral icterus or conjunctival erythema   Oropharynx:  Mucous membranes moist  Skin: no erythema, petechiae, or jaundice  CV: regular rhythm by palpation, 2+ distal pulses  Resp: normal respiratory effort without conversational dyspnea   Psych: normal mood and affect    Gait: Non-antalgic, appropriate coordination and balance   Neuro: Motor strength and sensation as noted below    Musculoskeletal:    Bilateral Wrist and Hand exam    Inspection:       No swelling, bruising or deformity bilaterally    Palpation:  -Tender over the right first CMC joint and first dorsal compartment of the wrist    ROM:       Full and symmetric active range of motion of the forearm, wrist and digits bilaterally    Strength:  Forearm supination 5/5 bilaterally  Forearm pronation 5/5 bilaterally  Wrist extension 5/5 bilaterally  Wrist flexion 5/5 bilaterally   strength 5/5 bilaterally  Finger abduction 5/5 bilaterally    Special Tests:  -Mild pain with Finkelstein's on the right    Neurovascular:       2+ radial pulses bilaterally and normal sensation to light touch in the radial, median and ulnar nerve distributions      Radiology:  X-rays ordered and independent visualization of images performed and reviewed with Amanda.  Joint spaces well maintained.  No acute osseous " abnormality seen.   Full radiology report to follow.        EMG 10/2/18:  Normal EMG of the bilateral upper extremities    Hand / Upper Extremity Injection/Arthrocentesis  Date/Time: 10/19/2018 3:00 PM  Performed by: MELANY MUNGUIA  Authorized by: MELANY MUNGUIA     Indications:  Pain  Needle Size:  25 G  Guidance: landmark    Approach:  Radial  Condition: de Quervain's    Site:  R extensor compartment 1  Medications:  40 mg triamcinolone acetonide 40 MG/ML  Outcome:  Tolerated well, no immediate complications  Procedure discussed: discussed risks, benefits, and alternatives     MEDICATION: Triamcinolone Acetonide 40 ROUTE: de quervain's injection  SITE: R wrist  DOSE: 40mg LOT #: [id number] : Amneal EXPIRATION DATE:  04/20 NDC: [address]-5            Assessment:  1. Right hand pain    2. Right wrist pain    3. De Quervain's tenosynovitis, right        Plan:  Discussed the assessment with the patient and developed a plan together:  -Steroid injection performed today.  Take it easy over the next few days. Keep in mind that the steroid may take up to 3 days to start working and up to 2 weeks to reach maximal effect.  Ice 15-20 minutes as needed for soreness.  Patient's preferred over the counter medication as needed for pain as directed on packaging.  -Ice or heat 15-20 minutes as needed (Avoid sleeping on a heating pad or ice)  -Over the counter lidocaine cream as needed (i.e. Aspercreme or generic equivalent)  -Bracing as needed for support and comfort.  -Avoid aggravating activities.    -We also discussed formal hand therapy.    -Follow up as needed if symptoms fail to improve or worsen.  Please call with questions or concerns.          Gladys Munguia MD, [facility]  [facility]      Again, thank you for allowing me to participate in the care of your patient.        Sincerely,        Melany Munguia MD

## 2018-10-19 NOTE — PROGRESS NOTES
Sports Medicine Clinic Visit    PCP: Sonny Warner    CC: Patient presents with:  Right Wrist - Pain      HPI:  Amanda Pittman is a 42 year old female who is seen in consultation at the request of Dr. Warner.   She notes right arm pain that began around the first of the year with insidious onset. She denies an injury.  She notes general aching of the forearm. She notes pain in the wrist and thumb as well. She rates the pain at a 5/10 at its worst and a 3/10 currently.  Symptoms are relieved with wrist bracing at night, ibuprofen. Symptoms are worsened by lifting, reaching back for a seatbelt, computer work, gripping and twisting. She endorses numbness and tingling over the medial side of the hand and thumb, weakness, and clicking.   She denies swelling, bruising, popping, grinding, catching, locking and instability.  Other treatment has included stretching and ice (feels good but does resolve the pain), and EMG completed on 10/2/18.       Review of Systems:  Musculoskeletal: as above  Remainder of review of systems is negative including constitutional, eyes, ENT, CV, pulmonary, GI, , endocrine, skin, hematologic, and neurologic except as noted in HPI or medical history.    History reviewed. No pertinent past surgical/medical/family/social history other than as mentioned in HPI.    Patient Active Problem List   Diagnosis     Hypothyroidism     Infertility management     Female infertility     CARDIOVASCULAR SCREENING; LDL GOAL LESS THAN 160     S/P LEEP (loop electrosurgical excision procedure)     History of cone biopsy of cervix complicating pregnancy     Status post  delivery     Past Medical History:   Diagnosis Date     Cancer (H)     Cervical     Screening for malignant neoplasm of the cervix     took most of cervix out. SANDY 2/3, CIS*     Thyroid disease      Past Surgical History:   Procedure Laterality Date     BIOPSY  2006    Colposcopy     C  DELIVERY ONLY  2003     , Low Cervical      SECTION N/A 2014    Procedure:  SECTION;  Surgeon: Max Merida MD;  Location: [facility]     COLONOSCOPY       HC COLP CERVIX/UPPER VAGINA W BX CERVIX/ENDOCERV CURETT  2006    SANDY 2/3, CIS      HC MARSUP BARTHOLIN GLAND CYST      x4     HC REMOVE TONSILS/ADENOIDS,12+ Y/O      Tonsils 11+y.o. tonsilectomy     LEEP TX, CERVICAL  2006     TONSILLECTOMY  7    Right - sided     Family History   Problem Relation Age of Onset     Cancer Maternal Grandmother      uterus cancer     HEART DISEASE Paternal Grandfather       of MI     HEART DISEASE Maternal Grandfather      Cancer Mother 72     myeloma     Other Cancer Mother      Multiple myeloma      Social History     Social History     Marital status:      Spouse name: Greg     Number of children: 1     Years of education: N/A     Occupational History      Not Employed     Social History Main Topics     Smoking status: Never Smoker     Smokeless tobacco: Never Used      Comment: used to smoke occ     Alcohol use No     Drug use: No     Sexual activity: Yes     Partners: Male     Birth control/ protection: None     Other Topics Concern      Service No     Blood Transfusions No     Caffeine Concern Yes     Occupational Exposure Yes     [employer]exec. assist.     Hobby Hazards No     Sleep Concern No     Stress Concern No     Weight Concern No     Special Diet No     Back Care No     fall from horse - healed     Exercise Yes     Seat Belt Yes     Parent/Sibling W/ Cabg, Mi Or Angioplasty Before 65f 55m? No     Social History Narrative     to Greg  And lives in [place] with son, Pérez.  No smokers in the home.  No indoor cats/kittens.  No concerns about domestic violence.         She works as an     Current Outpatient Prescriptions   Medication     levothyroxine (SYNTHROID/LEVOTHROID) 100 MCG tablet     ibuprofen (ADVIL,MOTRIN) 800 MG tablet     No  "current facility-administered medications for this visit.      Allergies   Allergen Reactions     [place] Other (See Comments)     Sores in mouth     Percocet [Oxycodone-Acetaminophen] Other (See Comments)     Tongue swelling and hives         Objective:  /77  Pulse 72  Ht 5' 5.5\" (1.664 m)  Wt 223 lb 9.6 oz (101.4 kg)  BMI 36.64 kg/m2    General: Alert and in no distress    Head: Normocephalic, atraumatic  Eyes: no scleral icterus or conjunctival erythema   Oropharynx:  Mucous membranes moist  Skin: no erythema, petechiae, or jaundice  CV: regular rhythm by palpation, 2+ distal pulses  Resp: normal respiratory effort without conversational dyspnea   Psych: normal mood and affect    Gait: Non-antalgic, appropriate coordination and balance   Neuro: Motor strength and sensation as noted below    Musculoskeletal:    Bilateral Wrist and Hand exam    Inspection:       No swelling, bruising or deformity bilaterally    Palpation:  -Tender over the right first CMC joint and first dorsal compartment of the wrist    ROM:       Full and symmetric active range of motion of the forearm, wrist and digits bilaterally    Strength:  Forearm supination 5/5 bilaterally  Forearm pronation 5/5 bilaterally  Wrist extension 5/5 bilaterally  Wrist flexion 5/5 bilaterally   strength 5/5 bilaterally  Finger abduction 5/5 bilaterally    Special Tests:  -Mild pain with Finkelstein's on the right    Neurovascular:       2+ radial pulses bilaterally and normal sensation to light touch in the radial, median and ulnar nerve distributions      Radiology:  X-rays ordered and independent visualization of images performed and reviewed with Amanda.  Joint spaces well maintained.  No acute osseous abnormality seen.   Full radiology report to follow.        EMG 10/2/18:  Normal EMG of the bilateral upper extremities    Hand / Upper Extremity Injection/Arthrocentesis  Date/Time: 10/19/2018 3:00 PM  Performed by: OMID DEE " NEWTON  Authorized by: OMID MUNGUIA     Indications:  Pain  Needle Size:  25 G  Guidance: landmark    Approach:  Radial  Condition: de Quervain's    Site:  R extensor compartment 1  Medications:  40 mg triamcinolone acetonide 40 MG/ML  Outcome:  Tolerated well, no immediate complications  Procedure discussed: discussed risks, benefits, and alternatives     MEDICATION: Triamcinolone Acetonide 40 ROUTE: de quervain's injection  SITE: R wrist  DOSE: 40mg LOT #: [id number] : Amneal EXPIRATION DATE:  04/20 NDC: [address]-1            Assessment:  1. Right hand pain    2. Right wrist pain    3. De Quervain's tenosynovitis, right        Plan:  Discussed the assessment with the patient and developed a plan together:  -Steroid injection performed today.  Take it easy over the next few days. Keep in mind that the steroid may take up to 3 days to start working and up to 2 weeks to reach maximal effect.  Ice 15-20 minutes as needed for soreness.  Patient's preferred over the counter medication as needed for pain as directed on packaging.  -Ice or heat 15-20 minutes as needed (Avoid sleeping on a heating pad or ice)  -Over the counter lidocaine cream as needed (i.e. Aspercreme or generic equivalent)  -Bracing as needed for support and comfort.  -Avoid aggravating activities.    -We also discussed formal hand therapy.    -Follow up as needed if symptoms fail to improve or worsen.  Please call with questions or concerns.          Gladys Munguia MD, CAQ Sports Medicine  [facility]

## 2018-10-19 NOTE — PATIENT INSTRUCTIONS
-Steroid injection performed today.  Take it easy over the next few days. Keep in mind that the steroid may take up to 3 days to start working and up to 2 weeks to reach maximal effect.  Ice 15-20 minutes as needed for soreness.  Patient's preferred over the counter medication as needed for pain as directed on packaging.  -Ice or heat 15-20 minutes as needed (Avoid sleeping on a heating pad or ice)  -Over the counter lidocaine cream as needed (i.e. Aspercreme or generic equivalent)  -Bracing as needed for support and comfort.  -Avoid aggravating activities.    -We also discussed formal hand therapy.    -Follow up as needed if symptoms fail to improve or worsen.  Please call with questions or concerns.

## 2018-12-19 ENCOUNTER — ALLIED HEALTH/NURSE VISIT (OUTPATIENT)
Dept: FAMILY MEDICINE | Facility: OTHER | Age: 43
End: 2018-12-19
Payer: COMMERCIAL

## 2018-12-19 DIAGNOSIS — Z23 NEED FOR PROPHYLACTIC VACCINATION AND INOCULATION AGAINST INFLUENZA: Primary | ICD-10-CM

## 2018-12-19 PROCEDURE — 90471 IMMUNIZATION ADMIN: CPT

## 2018-12-19 PROCEDURE — 90686 IIV4 VACC NO PRSV 0.5 ML IM: CPT

## 2018-12-19 PROCEDURE — 99207 ZZC NO CHARGE NURSE ONLY: CPT

## 2018-12-19 NOTE — PROGRESS NOTES

## 2019-03-14 ENCOUNTER — OFFICE VISIT (OUTPATIENT)
Dept: ORTHOPEDICS | Facility: OTHER | Age: 44
End: 2019-03-14
Payer: COMMERCIAL

## 2019-03-14 VITALS
HEIGHT: 66 IN | DIASTOLIC BLOOD PRESSURE: 70 MMHG | SYSTOLIC BLOOD PRESSURE: 102 MMHG | WEIGHT: 221 LBS | BODY MASS INDEX: 35.52 KG/M2

## 2019-03-14 DIAGNOSIS — S63.8X1A CMC (CARPOMETACARPAL JOINT) SPRAINS, RIGHT, INITIAL ENCOUNTER: Primary | ICD-10-CM

## 2019-03-14 PROCEDURE — 99203 OFFICE O/P NEW LOW 30 MIN: CPT | Mod: 25 | Performed by: ORTHOPAEDIC SURGERY

## 2019-03-14 PROCEDURE — 20600 DRAIN/INJ JOINT/BURSA W/O US: CPT | Mod: RT | Performed by: ORTHOPAEDIC SURGERY

## 2019-03-14 RX ORDER — BETAMETHASONE SODIUM PHOSPHATE AND BETAMETHASONE ACETATE 3; 3 MG/ML; MG/ML
6 INJECTION, SUSPENSION INTRA-ARTICULAR; INTRALESIONAL; INTRAMUSCULAR; SOFT TISSUE ONCE
Status: COMPLETED | OUTPATIENT
Start: 2019-03-14 | End: 2019-03-14

## 2019-03-14 RX ADMIN — BETAMETHASONE SODIUM PHOSPHATE AND BETAMETHASONE ACETATE 6 MG: 3; 3 INJECTION, SUSPENSION INTRA-ARTICULAR; INTRALESIONAL; INTRAMUSCULAR; SOFT TISSUE at 18:02

## 2019-03-14 ASSESSMENT — MIFFLIN-ST. JEOR: SCORE: 1669.2

## 2019-03-14 ASSESSMENT — PAIN SCALES - GENERAL: PAINLEVEL: MODERATE PAIN (5)

## 2019-03-14 NOTE — PROGRESS NOTES
ORTHOPEDIC CONSULT      Chief Complaint: Amanda Pittman is a 43 year old female who is being seen for Chief Complaint   Patient presents with     Musculoskeletal Problem     right wrist and hand pain     Consult     reF: Dr. Munguia       History of Present Illness:   Presents with right dorsal thumb pain.  Initially had the pain dating back to October.  Pain actually had come on over the year leading up to it.  No specific injuries or traumas.  Worse with lifting and gripping.  She received a first dorsal compartment injection 2018 by Dr. Munguia.  She reports this completely eliminated her pain.  Pain is the same as it was in the past.  Anti-inflammatories cause some GI issues.  She is been wearing a thumb brace.      Patient's past medical, surgical, social and family histories reviewed.     Past Medical History:   Diagnosis Date     Cancer (H)     Cervical     Screening for malignant neoplasm of the cervix     took most of cervix out. SANDY 2/3, CIS*     Thyroid disease        Past Surgical History:   Procedure Laterality Date     BIOPSY  2006    Colposcopy     C  DELIVERY ONLY  2003    , Low Cervical      SECTION N/A 2014    Procedure:  SECTION;  Surgeon: Max Merida MD;  Location: [facility]     COLONOSCOPY       HC COLP CERVIX/UPPER VAGINA W BX CERVIX/ENDOCERV CURETT  2006    SANDY 2/3, CIS      HC MARSUP BARTHOLIN GLAND CYST      x4     HC REMOVE TONSILS/ADENOIDS,12+ Y/O      Tonsils 11+y.o. tonsilectomy     LEEP TX, CERVICAL  2006     TONSILLECTOMY  7    Right - sided       Medications:    Current Outpatient Medications on File Prior to Visit:  ibuprofen (ADVIL,MOTRIN) 800 MG tablet Take 1 tablet (800 mg) by mouth every 8 hours as needed for moderate pain   levothyroxine (SYNTHROID/LEVOTHROID) 100 MCG tablet TAKE 1 TABLET BY MOUTH  DAILY. DUE FOR LABS FOR  FURTHER REFILLS     Current Facility-Administered Medications on File Prior  "to Visit:  triamcinolone acetonide (KENALOG-40) injection 40 mg       Allergies   Allergen Reactions     [place] Other (See Comments)     Sores in mouth     Percocet [Oxycodone-Acetaminophen] Other (See Comments)     Tongue swelling and hives       Social History     Occupational History     Employer: not employed   Tobacco Use     Smoking status: Never Smoker     Smokeless tobacco: Never Used     Tobacco comment: used to smoke occ   Substance and Sexual Activity     Alcohol use: No     Drug use: No     Sexual activity: Yes     Partners: Male     Birth control/protection: None       Family History   Problem Relation Age of Onset     Cancer Maternal Grandmother         uterus cancer     Heart Disease Paternal Grandfather          of MI     Heart Disease Maternal Grandfather      Cancer Mother 72        myeloma     Other Cancer Mother         Multiple myeloma 2015     Heart Disease Father        REVIEW OF SYSTEMS  10 point review systems performed otherwise negative as noted as per history of present illness.    Physical Exam:  Vitals: /70   Ht 1.676 m (5' 6\")   Wt 100.2 kg (221 lb)   BMI 35.67 kg/m    BMI= Body mass index is 35.67 kg/m .  Constitutional: healthy, alert and no acute distress   Psychiatric: mentation appears normal and affect normal/bright  NEURO: no focal deficits  RESP: Normal with easy respirations and no use of accessory muscles to breathe, no audible wheezing or retractions  CV: RUE:  no edema         Regular rate and rhythm by palpation  SKIN: No erythema, rashes, excoriation, or breakdown. No evidence of infection.   JOINT/EXTREMITIES:right hand and wrist: No gross deformity.  No erythema.  No edema.  No pain at the wrist including over the first dorsal compartment or carpal bones.  Pain is reproduced with palpation of the first CMC joint.  Negative Finkelstein's (or \"it actually makes it feel good \").  Fingers are warm and dry good capillary refill.  There is no deformity.  She " has full range of motion.     GAIT: not tested     Diagnostic Modalities:  right hand x-ray: No fractures or dislocations.  No significant arthritis including over the first CMC joint.  Independent visualization of the images was performed.      Impression: right first CMC joint pain    Plan:  All of the above pertinent physical exam and imaging modalities findings was reviewed with Amanda.                                          INJECTION PROCEDURE:  The patient was counseled about an  injection, including discussion of risks (including infection), contents of the injection, rationale for performing the injection, and expected benefits of the injection. The skin was prepped with alcohol and betadine and then utilizing sterile technique an injection of the right first CMC joint was performed. The injection consisted 1ml of Betamethasone (30mg per 5 ml) mixed with 1 ml of 0.5% Marcaine. The patient tolerated the injection well, and there were no complications. The injection site was covered with a Band-Aid. The injection was performed by Roby Lua, CHARLIE, CNP, DNP    Treatment options discussed.  Pain is reproduced today on exam of her CMC joint.  She has a negative Finkelstein.  Recommend a CMC joint injection.  She already has a thumb spica brace although this is very cumbersome.  Recommend occupational therapy and an order was placed.  I am also hopeful they can fabricate a low-profile thumb spica splint to be worn.  We will avoid the anti-inflammatories as its been causing some GI related issues.  Hopefully the combination of therapy and the injection as well as the brace will relieve her symptoms.    Return to clinic 4-6 , week(s), PRN, or sooner as needed for changes.  Re-x-ray on return: No    Mike Narayanan D.O.

## 2019-03-14 NOTE — LETTER
3/14/2019         RE: Amanda Pittman  [address] [place] [address]        Dear Colleague,    Thank you for referring your patient, Amanda Pittman, to the [facility]. Please see a copy of my visit note below.    ORTHOPEDIC CONSULT      Chief Complaint: Amanda Pittman is a 43 year old female who is being seen for Chief Complaint   Patient presents with     Musculoskeletal Problem     right wrist and hand pain     Consult     reF: Dr. Munguia       History of Present Illness:   Presents with right dorsal thumb pain.  Initially had the pain dating back to October.  Pain actually had come on over the year leading up to it.  No specific injuries or traumas.  Worse with lifting and gripping.  She received a first dorsal compartment injection 2018 by Dr. Munguia.  She reports this completely eliminated her pain.  Pain is the same as it was in the past.  Anti-inflammatories cause some GI issues.  She is been wearing a thumb brace.      Patient's past medical, surgical, social and family histories reviewed.     Past Medical History:   Diagnosis Date     Cancer (H)     Cervical     Screening for malignant neoplasm of the cervix     took most of cervix out. SANDY 2/3, CIS*     Thyroid disease        Past Surgical History:   Procedure Laterality Date     BIOPSY  2006    Colposcopy     C  DELIVERY ONLY  2003    , Low Cervical      SECTION N/A 2014    Procedure:  SECTION;  Surgeon: Max Merida MD;  Location: [facility] L[facility]     COLONOSCOPY       HC COLP CERVIX/UPPER VAGINA W BX CERVIX/ENDOCERV CURETT  2006    SANDY 2/3, CIS      HC MARSUP BARTHOLIN GLAND CYST      x4     HC REMOVE TONSILS/ADENOIDS,12+ Y/O      Tonsils 11+y.o. tonsilectomy     LEEP TX, CERVICAL  2006     TONSILLECTOMY  7    Right - sided       Medications:    Current Outpatient Medications on File Prior to Visit:  ibuprofen (ADVIL,MOTRIN) 800 MG tablet Take 1  "tablet (800 mg) by mouth every 8 hours as needed for moderate pain   levothyroxine (SYNTHROID/LEVOTHROID) 100 MCG tablet TAKE 1 TABLET BY MOUTH  DAILY. DUE FOR LABS FOR  FURTHER REFILLS     Current Facility-Administered Medications on File Prior to Visit:  triamcinolone acetonide (KENALOG-40) injection 40 mg       Allergies   Allergen Reactions     [place] Other (See Comments)     Sores in mouth     Percocet [Oxycodone-Acetaminophen] Other (See Comments)     Tongue swelling and hives       Social History     Occupational History     Employer: not employed   Tobacco Use     Smoking status: Never Smoker     Smokeless tobacco: Never Used     Tobacco comment: used to smoke occ   Substance and Sexual Activity     Alcohol use: No     Drug use: No     Sexual activity: Yes     Partners: Male     Birth control/protection: None       Family History   Problem Relation Age of Onset     Cancer Maternal Grandmother         uterus cancer     Heart Disease Paternal Grandfather          of MI     Heart Disease Maternal Grandfather      Cancer Mother 72        myeloma     Other Cancer Mother         Multiple myeloma 2015     Heart Disease Father        REVIEW OF SYSTEMS  10 point review systems performed otherwise negative as noted as per history of present illness.    Physical Exam:  Vitals: /70   Ht 1.676 m (5' 6\")   Wt 100.2 kg (221 lb)   BMI 35.67 kg/m     BMI= Body mass index is 35.67 kg/m .  Constitutional: healthy, alert and no acute distress   Psychiatric: mentation appears normal and affect normal/bright  NEURO: no focal deficits  RESP: Normal with easy respirations and no use of accessory muscles to breathe, no audible wheezing or retractions  CV: RUE:  no edema         Regular rate and rhythm by palpation  SKIN: No erythema, rashes, excoriation, or breakdown. No evidence of infection.   JOINT/EXTREMITIES:right hand and wrist: No gross deformity.  No erythema.  No edema.  No pain at the wrist including over " "the first dorsal compartment or carpal bones.  Pain is reproduced with palpation of the first CMC joint.  Negative Finkelstein's (or \"it actually makes it feel good \").  Fingers are warm and dry good capillary refill.  There is no deformity.  She has full range of motion.     GAIT: not tested     Diagnostic Modalities:  right hand x-ray: No fractures or dislocations.  No significant arthritis including over the first CMC joint.  Independent visualization of the images was performed.      Impression: right first CMC joint pain    Plan:  All of the above pertinent physical exam and imaging modalities findings was reviewed with Amanda.                                          INJECTION PROCEDURE:  The patient was counseled about an  injection, including discussion of risks (including infection), contents of the injection, rationale for performing the injection, and expected benefits of the injection. The skin was prepped with alcohol and betadine and then utilizing sterile technique an injection of the right first CMC joint was performed. The injection consisted 1ml of Betamethasone (30mg per 5 ml) mixed with 1 ml of 0.5% Marcaine. The patient tolerated the injection well, and there were no complications. The injection site was covered with a Band-Aid. The injection was performed by Roby Lua, APRN, CNP, DNP    Treatment options discussed.  Pain is reproduced today on exam of her CMC joint.  She has a negative Finkelstein.  Recommend a CMC joint injection.  She already has a thumb spica brace although this is very cumbersome.  Recommend occupational therapy and an order was placed.  I am also hopeful they can fabricate a low-profile thumb spica splint to be worn.  We will avoid the anti-inflammatories as its been causing some GI related issues.  Hopefully the combination of therapy and the injection as well as the brace will relieve her symptoms.    Return to clinic 4-6 , week(s), PRN, or sooner as needed for " changes.  Re-x-ray on return: Rakel Narayanan D.O.    Prior to injection, verified patient identity using patient's name and date of birth.  Due to injection administration, patient instructed to remain in clinic for 15 minutes  afterwards, and to report any adverse reaction to me immediately.    Joint injection was performed.      Drug Amount Wasted:  None.  Vial/Syringe: Multi dose vial  Expiration Date:  8/2020  The following medication was given by Cyrus Lua, APRN, CNP, DNP:     MEDICATION: Betamethasone (6 mg/mL)  ROUTE: Joint Injection  SITE: RIGHT CMC  DOSE: 1  LOT #: [id number]  : [employer]  EXPIRATION DATE:  8/2020  NDC: 4000-1287-67              Again, thank you for allowing me to participate in the care of your patient.        Sincerely,        Peterson Narayanan, DO

## 2019-03-14 NOTE — PROGRESS NOTES
Prior to injection, verified patient identity using patient's name and date of birth.  Due to injection administration, patient instructed to remain in clinic for 15 minutes  afterwards, and to report any adverse reaction to me immediately.    Joint injection was performed.      Drug Amount Wasted:  None.  Vial/Syringe: Multi dose vial  Expiration Date:  8/2020  The following medication was given by CHARLIE Simpson, CNP, DNP:     MEDICATION: Betamethasone (6 mg/mL)  ROUTE: Joint Injection  SITE: RIGHT CMC  DOSE: 1  LOT #: [id number]  : [employer]  EXPIRATION DATE:  8/2020  NDC: 0750-7125-84

## 2019-04-19 DIAGNOSIS — E03.9 ACQUIRED HYPOTHYROIDISM: ICD-10-CM

## 2019-04-22 NOTE — TELEPHONE ENCOUNTER
Synthroid  Routing refill request to provider for review/approval because:  Labs not current:  TSH    Kiesha Romero, RN, BSN

## 2019-04-24 RX ORDER — LEVOTHYROXINE SODIUM 100 UG/1
TABLET ORAL
Qty: 30 TABLET | Refills: 0 | Status: SHIPPED | OUTPATIENT
Start: 2019-04-24 | End: 2019-07-01

## 2019-04-25 NOTE — TELEPHONE ENCOUNTER
Your medication has been refilled.  Please schedule a lab and/or office visit to follow up on how this medication is working for you before your next refill.  We need to be sure everything is working well and stable prior to further refills.

## 2019-05-06 NOTE — PROGRESS NOTES
SUBJECTIVE:   CC: Amanda Pittman is an 43 year old woman who presents for preventive health visit.     Healthy Habits:     Getting at least 3 servings of Calcium per day:  Yes    Bi-annual eye exam:  Yes    Dental care twice a year:  Yes    Sleep apnea or symptoms of sleep apnea:  None    Diet:  Regular (no restrictions)    Frequency of exercise:  1 day/week    Duration of exercise:  15-30 minutes    Taking medications regularly:  Yes    Medication side effects:  Not applicable    PHQ-2 Total Score: 2    Additional concerns today:  Yes      Headache  Onset: 3 years    Description:   Location: bilateral in the frontal area, bilateral in the temporal area   Character: throbbing pain, sharp pain  Frequency:  2-3 times a month  Duration:  2 days     Intensity: moderate    Progression of Symptoms:  worsening    Accompanying Signs & Symptoms:  Stiff neck: YES  Neck or upper back pain: YES  Fever: no  Sinus pressure: Yes  Nausea or vomiting: no   Dizziness: YES  Numbness: no  Weakness: no  Visual changes: YES    History:   Head trauma: no  Family history of migraines: YES  Previous tests for headaches: no  Neurologist evaluations: no  Able to do daily activities: YES  Wake with a headaches: no  Do headaches wake you up: no  Daily pain medication use: no  Work/school stressors/changes: no    Precipitating factors:   Does light make it worse: YES  Does sound make it worse: YES    Alleviating factors:  Does sleep help: YES    Therapies Tried and outcome: Excedrin Migraine helps a little.     Would get HA starting with her last pregnancy.  Seem to be primarily hormonal, just before and during her periods.  Excedrin hasn't been helping as much lately.      Periods continue to be regular.      Today's PHQ-2 Score:   PHQ-2 ( 1999 [employer]) 5/8/2019   Q1: Little interest or pleasure in doing things 1   Q2: Feeling down, depressed or hopeless 1   PHQ-2 Score 2   Q1: Little interest or pleasure in doing things Several days   Q2:  Feeling down, depressed or hopeless Several days   PHQ-2 Score 2       Abuse: Current or Past(Physical, Sexual or Emotional)- No  Do you feel safe in your environment? Yes    Social History     Tobacco Use     Smoking status: Never Smoker     Smokeless tobacco: Never Used     Tobacco comment: used to smoke occ   Substance Use Topics     Alcohol use: No         Alcohol Use 2019   Prescreen: >3 drinks/day or >7 drinks/week? No   Prescreen: >3 drinks/day or >7 drinks/week? -       Reviewed orders with patient.  Reviewed health maintenance and updated orders accordingly - Yes  BP Readings from Last 3 Encounters:   19 110/74   19 102/70   10/19/18 116/77    Wt Readings from Last 3 Encounters:   19 101.9 kg (224 lb 9.6 oz)   19 100.2 kg (221 lb)   10/19/18 101.4 kg (223 lb 9.6 oz)                  Patient Active Problem List   Diagnosis     Hypothyroidism     Infertility management     Female infertility     CARDIOVASCULAR SCREENING; LDL GOAL LESS THAN 160     S/P LEEP (loop electrosurgical excision procedure)     History of cone biopsy of cervix complicating pregnancy     Status post  delivery     CMC (carpometacarpal joint) sprains, right, initial encounter     Past Surgical History:   Procedure Laterality Date     BIOPSY  2006    Colposcopy     C  DELIVERY ONLY  2003    , Low Cervical      SECTION N/A 2014    Procedure:  SECTION;  Surgeon: Max Merida MD;  Location: [facility]     COLONOSCOPY       HC COLP CERVIX/UPPER VAGINA W BX CERVIX/ENDOCERV CURETT  2006    SANDY 2/3, CIS      HC MARSUP BARTHOLIN GLAND CYST      x4     HC REMOVE TONSILS/ADENOIDS,12+ Y/O      Tonsils 11+y.o. tonsilectomy     LEEP TX, CERVICAL  2006     TONSILLECTOMY  7    Right - sided       Social History     Tobacco Use     Smoking status: Never Smoker     Smokeless tobacco: Never Used     Tobacco comment: used to smoke occ   Substance Use Topics      Alcohol use: No     Family History   Problem Relation Age of Onset     Cancer Maternal Grandmother         uterus cancer     Heart Disease Paternal Grandfather          of MI     Heart Disease Maternal Grandfather      Cancer Mother 72        myeloma     Other Cancer Mother         Multiple myeloma 2015     Heart Disease Father            Current Outpatient Medications   Medication Sig Dispense Refill     ibuprofen (ADVIL,MOTRIN) 800 MG tablet Take 1 tablet (800 mg) by mouth every 8 hours as needed for moderate pain 90 tablet 1     levothyroxine (SYNTHROID/LEVOTHROID) 100 MCG tablet TAKE 1 TABLET BY MOUTH  DAILY 30 tablet 0     Allergies   Allergen Reactions     [place] Other (See Comments)     Sores in mouth     Percocet [Oxycodone-Acetaminophen] Other (See Comments)     Tongue swelling and hives     Recent Labs   Lab Test 18  1156 17  1416 11/19/15  1528  11  0848   LDL  --   --  71  --  98   HDL  --   --  48*  --  50   TRIG  --   --  178*  --  72   ALT  --   --  17  --   --    CR  --   --  0.75  --   --    GFRESTIMATED  --   --  85  --   --    GFRESTBLACK  --   --  >90  African American GFR Calc    --   --    POTASSIUM  --   --  4.5  --   --    TSH 1.02 1.17 3.60   < >  --     < > = values in this interval not displayed.        Mammogram Screening: Patient under age 50, mutual decision reflected in health maintenance.      Pertinent mammograms are reviewed under the imaging tab.  History of abnormal Pap smear: NO - age 30-65 PAP every 5 years with negative HPV co-testing recommended  PAP / HPV 2014   PAP NIL NIL NIL     Reviewed and updated as needed this visit by clinical staff  Tobacco  Allergies  Meds         Reviewed and updated as needed this visit by Provider            Review of Systems   Constitutional: Negative for chills and fever.   HENT: Negative for congestion, ear pain, hearing loss and sore throat.    Eyes: Negative for pain and visual  "disturbance.   Respiratory: Negative for cough and shortness of breath.    Cardiovascular: Negative for chest pain, palpitations and peripheral edema.   Gastrointestinal: Negative for abdominal pain, constipation, diarrhea, heartburn, hematochezia and nausea.   Breasts:  Positive for tenderness. Negative for breast mass and discharge.   Genitourinary: Negative for dysuria, frequency, genital sores, hematuria, pelvic pain, urgency, vaginal bleeding and vaginal discharge.   Musculoskeletal: Positive for arthralgias. Negative for joint swelling and myalgias.   Skin: Negative for rash.   Neurological: Positive for headaches. Negative for dizziness, weakness and paresthesias.   Psychiatric/Behavioral: Positive for mood changes. The patient is nervous/anxious.      Still having some pain with her wrists.  Seeing sports medicine/ortho for this.     OBJECTIVE:   /74 (BP Location: Left arm, Patient Position: Chair, Cuff Size: Adult Regular)   Pulse 84   Temp 98.5  F (36.9  C) (Temporal)   Resp 16   Ht 1.689 m (5' 6.5\")   Wt 101.9 kg (224 lb 9.6 oz)   LMP 04/15/2019 (Approximate)   SpO2 100%   Breastfeeding? No   BMI 35.71 kg/m    Physical Exam  GENERAL: healthy, alert and no distress  EYES: Eyes grossly normal to inspection, PERRL and conjunctivae and sclerae normal  HENT: ear canals and TM's normal, nose and mouth without ulcers or lesions  NECK: no adenopathy, no asymmetry, masses, or scars and thyroid normal to palpation  RESP: lungs clear to auscultation - no rales, rhonchi or wheezes  BREAST: normal without masses, tenderness or nipple discharge and no palpable axillary masses or adenopathy  CV: regular rate and rhythm, normal S1 S2, no S3 or S4, no murmur, click or rub, no peripheral edema and peripheral pulses strong  ABDOMEN: soft, nontender, no hepatosplenomegaly, no masses and bowel sounds normal   (female): normal female external genitalia, normal urethral meatus , vaginal mucosa pink, moist, well " rugated and normal cervix, adnexae, and uterus without masses.  MS: no gross musculoskeletal defects noted, no edema  SKIN: no suspicious lesions or rashes  NEURO: Normal strength and tone, mentation intact and speech normal  PSYCH: mentation appears normal, affect normal/bright    Diagnostic Test Results:  Results for orders placed or performed in visit on 10/19/18   XR Hand Right G/E 3 Views    Narrative    RIGHT HAND THREE OR MORE VIEWS  10/19/2018 2:44 PM     HISTORY: Pain at first CMC joint. Right hand pain. Right wrist pain.    COMPARISON: None.      Impression    IMPRESSION: Joint spaces are well-preserved. No acute fracture or  subluxation. Negative.    MERCEDES TURCIOS MD       ASSESSMENT/PLAN:       ICD-10-CM    1. Preventative health care Z00.00 TSH WITH FREE T4 REFLEX     Pap imaged thin layer screen with HPV - recommended age 30 - 65 years (select HPV order below)     HPV High Risk Types DNA Cervical   2. Intractable migraine with aura without status migrainosus G43.119 SUMAtriptan (IMITREX) 50 MG tablet   3. Acquired hypothyroidism E03.9 TSH WITH FREE T4 REFLEX   4. Screening for malignant neoplasm of cervix Z12.4 Pap imaged thin layer screen with HPV - recommended age 30 - 65 years (select HPV order below)     HPV High Risk Types DNA Cervical     1,4.  Reviewed recommended screenings and ordered appropriate testing for pt's risks and per pt's request(s).   2.  Discussed options for treatment including abortive and preventive measures.  Will try Imitrex.  If this is not helping, can consider prevention with Topamax and/or amitriptyline.  3.  Currently controlled.  Will check monitoring labs.  Adjust dose if clinically warranted.    Portions of this note were completed using Dragon dictation software.  Although reviewed, there may be typographical and other inadvertent errors that remain.           COUNSELING:  Reviewed preventive health counseling, as reflected in patient instructions       Regular  "exercise       Healthy diet/nutrition       Alcohol Use       Contraception       Family planning       Osteoporosis Prevention/Bone Health       The ASCVD Risk score (Mindi GARIBAY Jr., et al., 2013) failed to calculate for the following reasons:    Cannot find a previous HDL lab    Cannot find a previous total cholesterol lab    BP Readings from Last 1 Encounters:   05/09/19 110/74     Estimated body mass index is 35.71 kg/m  as calculated from the following:    Height as of this encounter: 1.689 m (5' 6.5\").    Weight as of this encounter: 101.9 kg (224 lb 9.6 oz).      Weight management plan: Discussed healthy diet and exercise guidelines     reports that she has never smoked. She has never used smokeless tobacco.      Counseling Resources:  ATP IV Guidelines  Pooled Cohorts Equation Calculator  Breast Cancer Risk Calculator  FRAX Risk Assessment  ICSI Preventive Guidelines  Dietary Guidelines for Americans, 2010  USDA's MyPlate  ASA Prophylaxis  Lung CA Screening    Sonny Warner MD, MD  [facility]    Patient Instructions   Try the Imitrex when you have your headaches.  OK to take with your usual pain medications.    If not helping, we can try the Topamax, amitriptyline, or propranolol that we discussed.      We'll let you know your lab results as soon as we can.     "

## 2019-05-06 NOTE — PATIENT INSTRUCTIONS
Try the Imitrex when you have your headaches.  OK to take with your usual pain medications.    If not helping, we can try the Topamax, amitriptyline, or propranolol that we discussed.      We'll let you know your lab results as soon as we can.     Contact us or return if questions or concerns.     Have a nice day!    Dr. Warner     Preventive Health Recommendations  Female Ages 40 to 49    Yearly exam:     See your health care provider every year in order to  1. Review health changes.   2. Discuss preventive care.    3. Review your medicines if your doctor prescribed any.      Get a Pap test every three years (unless you have an abnormal result and your provider advises testing more often).      If you get Pap tests with HPV test, you only need to test every 5 years, unless you have an abnormal result. You do not need a Pap test if your uterus was removed (hysterectomy) and you have not had cancer.      You should be tested each year for STDs (sexually transmitted diseases), if you're at risk.     Ask your doctor if you should have a mammogram.      Have a colonoscopy (test for colon cancer) if someone in your family has had colon cancer or polyps before age 50.       Have a cholesterol test every 5 years.       Have a diabetes test (fasting glucose) after age 45. If you are at risk for diabetes, you should have this test every 3 years.    Shots: Get a flu shot each year. Get a tetanus shot every 10 years.     Nutrition:     Eat at least 5 servings of fruits and vegetables each day.    Eat whole-grain bread, whole-wheat pasta and brown rice instead of white grains and rice.    Get adequate Calcium and Vitamin D.      Lifestyle    Exercise at least 150 minutes a week (an average of 30 minutes a day, 5 days a week). This will help you control your weight and prevent disease.    Limit alcohol to one drink per day.    No smoking.     Wear sunscreen to prevent skin cancer.    See your dentist every six months for an exam  and cleaning.

## 2019-05-08 ASSESSMENT — ENCOUNTER SYMPTOMS
MYALGIAS: 0
NERVOUS/ANXIOUS: 1
ABDOMINAL PAIN: 0
HEMATURIA: 0
ARTHRALGIAS: 1
HEMATOCHEZIA: 0
CHILLS: 0
SORE THROAT: 0
BREAST MASS: 0
HEADACHES: 1
DYSURIA: 0
CONSTIPATION: 0
WEAKNESS: 0
PARESTHESIAS: 0
FEVER: 0
EYE PAIN: 0
DIARRHEA: 0
FREQUENCY: 0
NAUSEA: 0
JOINT SWELLING: 0
COUGH: 0
SHORTNESS OF BREATH: 0
PALPITATIONS: 0
DIZZINESS: 0
HEARTBURN: 0

## 2019-05-09 ENCOUNTER — OFFICE VISIT (OUTPATIENT)
Dept: FAMILY MEDICINE | Facility: OTHER | Age: 44
End: 2019-05-09
Payer: COMMERCIAL

## 2019-05-09 VITALS
DIASTOLIC BLOOD PRESSURE: 74 MMHG | TEMPERATURE: 98.5 F | OXYGEN SATURATION: 100 % | RESPIRATION RATE: 16 BRPM | SYSTOLIC BLOOD PRESSURE: 110 MMHG | HEIGHT: 67 IN | BODY MASS INDEX: 35.25 KG/M2 | WEIGHT: 224.6 LBS | HEART RATE: 84 BPM

## 2019-05-09 DIAGNOSIS — Z98.890 S/P LEEP (LOOP ELECTROSURGICAL EXCISION PROCEDURE): ICD-10-CM

## 2019-05-09 DIAGNOSIS — E03.9 ACQUIRED HYPOTHYROIDISM: ICD-10-CM

## 2019-05-09 DIAGNOSIS — Z12.4 SCREENING FOR MALIGNANT NEOPLASM OF CERVIX: ICD-10-CM

## 2019-05-09 DIAGNOSIS — G43.119 INTRACTABLE MIGRAINE WITH AURA WITHOUT STATUS MIGRAINOSUS: ICD-10-CM

## 2019-05-09 DIAGNOSIS — Z00.00 PREVENTATIVE HEALTH CARE: Primary | ICD-10-CM

## 2019-05-09 LAB — TSH SERPL DL<=0.005 MIU/L-ACNC: 0.83 MU/L (ref 0.4–4)

## 2019-05-09 PROCEDURE — 99214 OFFICE O/P EST MOD 30 MIN: CPT | Mod: 25 | Performed by: FAMILY MEDICINE

## 2019-05-09 PROCEDURE — 99396 PREV VISIT EST AGE 40-64: CPT | Performed by: FAMILY MEDICINE

## 2019-05-09 PROCEDURE — 84443 ASSAY THYROID STIM HORMONE: CPT | Performed by: FAMILY MEDICINE

## 2019-05-09 PROCEDURE — G0145 SCR C/V CYTO,THINLAYER,RESCR: HCPCS | Performed by: FAMILY MEDICINE

## 2019-05-09 PROCEDURE — 36415 COLL VENOUS BLD VENIPUNCTURE: CPT | Performed by: FAMILY MEDICINE

## 2019-05-09 PROCEDURE — 87624 HPV HI-RISK TYP POOLED RSLT: CPT | Performed by: FAMILY MEDICINE

## 2019-05-09 RX ORDER — SUMATRIPTAN 50 MG/1
50 TABLET, FILM COATED ORAL
Qty: 12 TABLET | Refills: 1 | Status: SHIPPED | OUTPATIENT
Start: 2019-05-09 | End: 2021-09-17

## 2019-05-09 ASSESSMENT — ENCOUNTER SYMPTOMS
FEVER: 0
HEMATURIA: 0
NAUSEA: 0
ARTHRALGIAS: 1
ABDOMINAL PAIN: 0
PARESTHESIAS: 0
DYSURIA: 0
JOINT SWELLING: 0
PALPITATIONS: 0
COUGH: 0
HEMATOCHEZIA: 0
HEARTBURN: 0
SHORTNESS OF BREATH: 0
DIARRHEA: 0
MYALGIAS: 0
NERVOUS/ANXIOUS: 1
CONSTIPATION: 0
HEADACHES: 1
FREQUENCY: 0
EYE PAIN: 0
SORE THROAT: 0
WEAKNESS: 0
BREAST MASS: 0
DIZZINESS: 0
CHILLS: 0

## 2019-05-09 ASSESSMENT — MIFFLIN-ST. JEOR: SCORE: 1693.47

## 2019-05-13 LAB
COPATH REPORT: NORMAL
PAP: NORMAL

## 2019-05-15 LAB
FINAL DIAGNOSIS: NORMAL
HPV HR 12 DNA CVX QL NAA+PROBE: NEGATIVE
HPV16 DNA SPEC QL NAA+PROBE: NEGATIVE
HPV18 DNA SPEC QL NAA+PROBE: NEGATIVE
SPECIMEN DESCRIPTION: NORMAL
SPECIMEN SOURCE CVX/VAG CYTO: NORMAL

## 2019-07-01 DIAGNOSIS — E03.9 ACQUIRED HYPOTHYROIDISM: ICD-10-CM

## 2019-07-03 RX ORDER — LEVOTHYROXINE SODIUM 100 UG/1
TABLET ORAL
Qty: 90 TABLET | Refills: 3 | Status: SHIPPED | OUTPATIENT
Start: 2019-07-03 | End: 2020-06-19

## 2019-07-03 NOTE — TELEPHONE ENCOUNTER
Pending Prescriptions:                       Disp   Refills    levothyroxine (SYNTHROID/LEVOTHROID) 100 *30 tab*0            Sig: TAKE 1 TABLET BY MOUTH  DAILY    Prescription approved per [facility] [place] [place] Refill Protocol.    Farnaz Herr, RN, BSN

## 2019-10-08 ENCOUNTER — HOSPITAL ENCOUNTER (OUTPATIENT)
Dept: MAMMOGRAPHY | Facility: CLINIC | Age: 44
Discharge: HOME | End: 2019-10-08
Attending: FAMILY MEDICINE | Admitting: FAMILY MEDICINE
Payer: COMMERCIAL

## 2019-10-08 DIAGNOSIS — Z12.31 VISIT FOR SCREENING MAMMOGRAM: ICD-10-CM

## 2019-10-08 PROCEDURE — 77063 BREAST TOMOSYNTHESIS BI: CPT

## 2019-10-14 NOTE — TELEPHONE ENCOUNTER
Responded via mychart.    Gregoria Pena CMA ([facility])     3 Detail Level: Detailed Initiate Treatment: Minocycline 100 mg BID\\nBactroban ointment BID-TID

## 2019-10-26 ENCOUNTER — HEALTH MAINTENANCE LETTER (OUTPATIENT)
Age: 44
End: 2019-10-26

## 2019-11-13 ENCOUNTER — IMMUNIZATION (OUTPATIENT)
Dept: FAMILY MEDICINE | Facility: OTHER | Age: 44
End: 2019-11-13
Payer: COMMERCIAL

## 2019-11-13 DIAGNOSIS — Z23 NEED FOR PROPHYLACTIC VACCINATION AND INOCULATION AGAINST INFLUENZA: Primary | ICD-10-CM

## 2019-11-13 PROCEDURE — 99207 ZZC NO CHARGE NURSE ONLY: CPT

## 2019-11-13 PROCEDURE — 90686 IIV4 VACC NO PRSV 0.5 ML IM: CPT

## 2019-11-13 PROCEDURE — 90471 IMMUNIZATION ADMIN: CPT

## 2020-01-10 NOTE — PROGRESS NOTES
Subjective     Amanda Pittman is a 44 year old female who presents to clinic today for the following health issues:    HPI   Concern - Derm Problem  Onset: 1 year    Description:   Patient has nickel size brown patch on lower left side of back. Some burning and itching. Skin is raised and feels rough.    Intensity: moderate    Progression of Symptoms:  same and might be getting bigger    Accompanying Signs & Symptoms:  None    Previous history of similar problem:   None    Therapies Tried and outcome: Cortisone    Had patch of dry skin for 1-2 years.  She did look and see it in mirror- is brown.  She is normally mole.  No skin cancer in past- has had lots of moles removed.  She has never had a full skin check. No fever, chills, sweats, unwanted weight loss, nausea.    Patient Active Problem List   Diagnosis     Hypothyroidism     Infertility management     Female infertility     CARDIOVASCULAR SCREENING; LDL GOAL LESS THAN 160     S/P LEEP (loop electrosurgical excision procedure)     History of cone biopsy of cervix complicating pregnancy     Status post  delivery     CMC (carpometacarpal joint) sprains, right, initial encounter     Past Surgical History:   Procedure Laterality Date     BIOPSY  2006    Colposcopy     C  DELIVERY ONLY  2003    , Low Cervical      SECTION N/A 2014    Procedure:  SECTION;  Surgeon: Max Merida MD;  Location: [facility] L+[facility]     COLONOSCOPY       HC COLP CERVIX/UPPER VAGINA W BX CERVIX/ENDOCERV CURETT  2006    SANDY 2/3, CIS      HC MARSUP BARTHOLIN GLAND CYST      x4     HC REMOVE TONSILS/ADENOIDS,12+ Y/O      Tonsils 11+y.o. tonsilectomy     LEEP TX, CERVICAL       TONSILLECTOMY  08    Right - sided       Social History     Tobacco Use     Smoking status: Never Smoker     Smokeless tobacco: Never Used     Tobacco comment: used to smoke occ   Substance Use Topics     Alcohol use: No     Family History   Problem  "Relation Age of Onset     Cancer Maternal Grandmother         uterus cancer     Heart Disease Paternal Grandfather          of MI     Heart Disease Maternal Grandfather      Cancer Mother 72        myeloma     Other Cancer Mother         Multiple myeloma 2015     Heart Disease Father          Current Outpatient Medications   Medication Sig Dispense Refill     ibuprofen (ADVIL,MOTRIN) 800 MG tablet Take 1 tablet (800 mg) by mouth every 8 hours as needed for moderate pain 90 tablet 1     levothyroxine (SYNTHROID/LEVOTHROID) 100 MCG tablet TAKE 1 TABLET BY MOUTH  DAILY 90 tablet 3     SUMAtriptan (IMITREX) 50 MG tablet Take 1 tablet (50 mg) by mouth at onset of headache for migraine 12 tablet 1     Allergies   Allergen Reactions     [place] Other (See Comments)     Sores in mouth     Percocet [Oxycodone-Acetaminophen] Other (See Comments)     Tongue swelling and hives       Reviewed and updated as needed this visit by Provider  Tobacco  Allergies  Meds  Problems  Med Hx  Surg Hx  Fam Hx         Review of Systems   ROS COMP: Constitutional, HEENT, cardiovascular, pulmonary, gi and gu systems are negative, except as otherwise noted.      Objective    /78   Pulse 68   Temp 97.9  F (36.6  C) (Temporal)   Resp 16   Ht 1.679 m (5' 6.1\")   Wt 103.1 kg (227 lb 6.4 oz)   BMI 36.59 kg/m    Body mass index is 36.59 kg/m .  Physical Exam   GENERAL: healthy, alert and no distress  EYES: Eyes grossly normal to inspection, PERRL and conjunctivae and sclerae normal  HENT: ear canals and TM's normal, nose and mouth without ulcers or lesions  NECK: no adenopathy, no asymmetry, masses, or scars and thyroid normal to palpation  RESP: lungs clear to auscultation - no rales, rhonchi or wheezes  CV: regular rate and rhythm, normal S1 S2, no S3 or S4, no murmur, click or rub, no peripheral edema and peripheral pulses strong  ABDOMEN: soft, nontender, no hepatosplenomegaly, no masses and bowel sounds normal  MS: no " "gross musculoskeletal defects noted, no edema  SKIN: no suspicious lesions or rashes  SKIN: mole she is wondering about is typical seborrheic keratosis with dermatoscope    Diagnostic Test Results:  Labs reviewed in Epic  No results found for this or any previous visit (from the past 24 hour(s)).        Assessment & Plan     1. Seborrheic keratosis  Discussed typical appearance and benign nature.  Did review characteristics of atypical moles.  Follow up in clinic if no improvement, worsening symptoms, or concerns.        BMI:   Estimated body mass index is 36.59 kg/m  as calculated from the following:    Height as of this encounter: 1.679 m (5' 6.1\").    Weight as of this encounter: 103.1 kg (227 lb 6.4 oz).   Weight management plan: Patient was referred to their PCP to discuss a diet and exercise plan.      Return in about 1 year (around 1/17/2021) for Physical Exam.    Meka York, KEVIN  [facility]  Answers for HPI/ROS submitted by the patient on 1/17/2020   If you checked off any problems, how difficult have these problems made it for you to do your work, take care of things at home, or get along with other people?: Not difficult at all  PHQ9 TOTAL SCORE: 0  MISAEL 7 TOTAL SCORE: 5    "

## 2020-01-14 ASSESSMENT — ANXIETY QUESTIONNAIRES
7. FEELING AFRAID AS IF SOMETHING AWFUL MIGHT HAPPEN: SEVERAL DAYS
5. BEING SO RESTLESS THAT IT IS HARD TO SIT STILL: NOT AT ALL
3. WORRYING TOO MUCH ABOUT DIFFERENT THINGS: SEVERAL DAYS
4. TROUBLE RELAXING: NOT AT ALL
1. FEELING NERVOUS, ANXIOUS, OR ON EDGE: SEVERAL DAYS
GAD7 TOTAL SCORE: 5
2. NOT BEING ABLE TO STOP OR CONTROL WORRYING: SEVERAL DAYS
6. BECOMING EASILY ANNOYED OR IRRITABLE: SEVERAL DAYS

## 2020-01-14 ASSESSMENT — PATIENT HEALTH QUESTIONNAIRE - PHQ9: SUM OF ALL RESPONSES TO PHQ QUESTIONS 1-9: 0

## 2020-01-15 ASSESSMENT — ANXIETY QUESTIONNAIRES: GAD7 TOTAL SCORE: 5

## 2020-01-15 ASSESSMENT — PATIENT HEALTH QUESTIONNAIRE - PHQ9: SUM OF ALL RESPONSES TO PHQ QUESTIONS 1-9: 0

## 2020-01-17 ENCOUNTER — OFFICE VISIT (OUTPATIENT)
Dept: FAMILY MEDICINE | Facility: OTHER | Age: 45
End: 2020-01-17
Payer: COMMERCIAL

## 2020-01-17 VITALS
BODY MASS INDEX: 36.55 KG/M2 | HEART RATE: 68 BPM | SYSTOLIC BLOOD PRESSURE: 100 MMHG | WEIGHT: 227.4 LBS | DIASTOLIC BLOOD PRESSURE: 78 MMHG | HEIGHT: 66 IN | RESPIRATION RATE: 16 BRPM | TEMPERATURE: 97.9 F

## 2020-01-17 DIAGNOSIS — L82.1 SEBORRHEIC KERATOSIS: Primary | ICD-10-CM

## 2020-01-17 PROCEDURE — 99213 OFFICE O/P EST LOW 20 MIN: CPT | Performed by: NURSE PRACTITIONER

## 2020-01-17 ASSESSMENT — ANXIETY QUESTIONNAIRES
2. NOT BEING ABLE TO STOP OR CONTROL WORRYING: SEVERAL DAYS
GAD7 TOTAL SCORE: 5
7. FEELING AFRAID AS IF SOMETHING AWFUL MIGHT HAPPEN: SEVERAL DAYS
3. WORRYING TOO MUCH ABOUT DIFFERENT THINGS: SEVERAL DAYS
6. BECOMING EASILY ANNOYED OR IRRITABLE: SEVERAL DAYS
5. BEING SO RESTLESS THAT IT IS HARD TO SIT STILL: NOT AT ALL
4. TROUBLE RELAXING: NOT AT ALL
GAD7 TOTAL SCORE: 5
1. FEELING NERVOUS, ANXIOUS, OR ON EDGE: SEVERAL DAYS
7. FEELING AFRAID AS IF SOMETHING AWFUL MIGHT HAPPEN: SEVERAL DAYS
GAD7 TOTAL SCORE: 5

## 2020-01-17 ASSESSMENT — PATIENT HEALTH QUESTIONNAIRE - PHQ9
SUM OF ALL RESPONSES TO PHQ QUESTIONS 1-9: 0
10. IF YOU CHECKED OFF ANY PROBLEMS, HOW DIFFICULT HAVE THESE PROBLEMS MADE IT FOR YOU TO DO YOUR WORK, TAKE CARE OF THINGS AT HOME, OR GET ALONG WITH OTHER PEOPLE: NOT DIFFICULT AT ALL
SUM OF ALL RESPONSES TO PHQ QUESTIONS 1-9: 0

## 2020-01-17 ASSESSMENT — MIFFLIN-ST. JEOR: SCORE: 1694.82

## 2020-01-17 ASSESSMENT — PAIN SCALES - GENERAL: PAINLEVEL: NO PAIN (0)

## 2020-01-17 NOTE — PATIENT INSTRUCTIONS
Seborrheic keratosis  Patient Education     Understanding Seborrheic Keratosis  Seborrheic keratoses are wart-like growths on the skin. These growths are not cancer. Many people get them, especially after age 30.  How to say it  [id number]   What causes seborrheic keratoses?  Doctors do not know what causes seborrheic keratoses. They may run in families. In addition, they become more common as people get older.  What are the symptoms of seborrheic keratoses?  Here is what seborrheic keratoses often look like:    They tend to be round or oval in shape. They can be very small or about as big across as a quarter.    They can appear singly or in clusters.    They tend to be tan, brown, or black in color.    The edges may be scalloped or uneven-looking.    They can have a waxy or scaly look.    They can be a bit raised, appearing to be  stuck on  the skin.    They may become red and irritated if scratched or rubbed by clothing  Sebhorreic keratoses are not painful, but they may be itchy. They can become irritated if they are continually rubbed by skin or clothing. Seborrheic keratoses most often appear on the face, arms, chest, back, or belly.  How are seborrheic keratoses treated?  Seborrheic keratoses don t need to be treated unless they bother you. You may choose to have them removed because you find them unattractive. You may also want them removed because they get irritated and uncomfortable. A healthcare provider can remove them in an office visit. Ways that seborrheic keratoses can be removed include:    Freezing them off with liquid nitrogen    Cutting them off with a scalpel    Burning them off with electricity  What are the complications of seborrheic keratoses?  Seborrheic keratoses are not cancer, but they can look like some types of skin cancer. So it s a good idea to ask your healthcare provider to check any new skin growths. Ask your healthcare provider about any skin problem that  concerns you.  When should I call my healthcare provider?  Call your healthcare provider right away if any of these occur:    You develop a lot of seborrheic keratoses very quickly    You have a sore that does not heal within a few weeks, or heals and then comes back    You have a mole or skin growth that is changing in size, shape, or color    You have a mole or skin growth that looks different on one side from the other    You have a mole or skin growth that is not the same color throughout   Date Last Reviewed: 5/1/2016 2000-2019 [employer]. [address] [place] [address]. All rights reserved. This information is not intended as a substitute for professional medical care. Always follow your healthcare professional's instructions.

## 2020-03-26 ENCOUNTER — APPOINTMENT (OUTPATIENT)
Dept: MRI IMAGING | Facility: CLINIC | Age: 45
End: 2020-03-26
Attending: EMERGENCY MEDICINE
Payer: COMMERCIAL

## 2020-03-26 ENCOUNTER — HOSPITAL ENCOUNTER (OUTPATIENT)
Facility: CLINIC | Age: 45
Setting detail: OBSERVATION
Discharge: HOME | End: 2020-03-27
Attending: EMERGENCY MEDICINE | Admitting: HOSPITALIST
Payer: COMMERCIAL

## 2020-03-26 DIAGNOSIS — M54.9 INTRACTABLE BACK PAIN: ICD-10-CM

## 2020-03-26 DIAGNOSIS — M62.830 BACK MUSCLE SPASM: ICD-10-CM

## 2020-03-26 PROCEDURE — G0378 HOSPITAL OBSERVATION PER HR: HCPCS

## 2020-03-26 PROCEDURE — 72148 MRI LUMBAR SPINE W/O DYE: CPT

## 2020-03-26 PROCEDURE — 99284 EMERGENCY DEPT VISIT MOD MDM: CPT | Mod: Z6 | Performed by: EMERGENCY MEDICINE

## 2020-03-26 PROCEDURE — 25000132 ZZH RX MED GY IP 250 OP 250 PS 637: Performed by: HOSPITALIST

## 2020-03-26 PROCEDURE — 25000132 ZZH RX MED GY IP 250 OP 250 PS 637: Performed by: FAMILY MEDICINE

## 2020-03-26 PROCEDURE — 25000128 H RX IP 250 OP 636: Performed by: EMERGENCY MEDICINE

## 2020-03-26 PROCEDURE — 96376 TX/PRO/DX INJ SAME DRUG ADON: CPT | Performed by: EMERGENCY MEDICINE

## 2020-03-26 PROCEDURE — 96375 TX/PRO/DX INJ NEW DRUG ADDON: CPT | Performed by: EMERGENCY MEDICINE

## 2020-03-26 PROCEDURE — 99285 EMERGENCY DEPT VISIT HI MDM: CPT | Mod: 25 | Performed by: EMERGENCY MEDICINE

## 2020-03-26 PROCEDURE — 99219 ZZC INITIAL OBSERVATION CARE,LEVL II: CPT | Performed by: HOSPITALIST

## 2020-03-26 PROCEDURE — 96365 THER/PROPH/DIAG IV INF INIT: CPT | Performed by: EMERGENCY MEDICINE

## 2020-03-26 RX ORDER — NALOXONE HYDROCHLORIDE 0.4 MG/ML
.1-.4 INJECTION, SOLUTION INTRAMUSCULAR; INTRAVENOUS; SUBCUTANEOUS
Status: DISCONTINUED | OUTPATIENT
Start: 2020-03-26 | End: 2020-03-27 | Stop reason: HOSPADM

## 2020-03-26 RX ORDER — MAGNESIUM SULFATE 1 G/100ML
1 INJECTION INTRAVENOUS ONCE
Status: COMPLETED | OUTPATIENT
Start: 2020-03-26 | End: 2020-03-26

## 2020-03-26 RX ORDER — MORPHINE SULFATE 2 MG/ML
1 INJECTION, SOLUTION INTRAMUSCULAR; INTRAVENOUS
Status: DISCONTINUED | OUTPATIENT
Start: 2020-03-26 | End: 2020-03-27 | Stop reason: HOSPADM

## 2020-03-26 RX ORDER — DIAZEPAM 10 MG/2ML
10 INJECTION, SOLUTION INTRAMUSCULAR; INTRAVENOUS ONCE
Status: COMPLETED | OUTPATIENT
Start: 2020-03-26 | End: 2020-03-26

## 2020-03-26 RX ORDER — ACETAMINOPHEN 325 MG/1
650 TABLET ORAL EVERY 4 HOURS PRN
Status: DISCONTINUED | OUTPATIENT
Start: 2020-03-26 | End: 2020-03-27 | Stop reason: HOSPADM

## 2020-03-26 RX ORDER — ONDANSETRON 2 MG/ML
4 INJECTION INTRAMUSCULAR; INTRAVENOUS EVERY 6 HOURS PRN
Status: DISCONTINUED | OUTPATIENT
Start: 2020-03-26 | End: 2020-03-27 | Stop reason: HOSPADM

## 2020-03-26 RX ORDER — POLYETHYLENE GLYCOL 3350 17 G/17G
17 POWDER, FOR SOLUTION ORAL DAILY PRN
Status: DISCONTINUED | OUTPATIENT
Start: 2020-03-26 | End: 2020-03-27 | Stop reason: HOSPADM

## 2020-03-26 RX ORDER — KETOROLAC TROMETHAMINE 30 MG/ML
30 INJECTION, SOLUTION INTRAMUSCULAR; INTRAVENOUS ONCE
Status: COMPLETED | OUTPATIENT
Start: 2020-03-26 | End: 2020-03-26

## 2020-03-26 RX ORDER — SUMATRIPTAN 25 MG/1
50 TABLET, FILM COATED ORAL
Status: DISCONTINUED | OUTPATIENT
Start: 2020-03-26 | End: 2020-03-27 | Stop reason: HOSPADM

## 2020-03-26 RX ORDER — ACETAMINOPHEN 650 MG/1
650 SUPPOSITORY RECTAL EVERY 4 HOURS PRN
Status: DISCONTINUED | OUTPATIENT
Start: 2020-03-26 | End: 2020-03-27 | Stop reason: HOSPADM

## 2020-03-26 RX ORDER — ONDANSETRON 4 MG/1
4 TABLET, ORALLY DISINTEGRATING ORAL EVERY 6 HOURS PRN
Status: DISCONTINUED | OUTPATIENT
Start: 2020-03-26 | End: 2020-03-27 | Stop reason: HOSPADM

## 2020-03-26 RX ORDER — HYDROCODONE BITARTRATE AND ACETAMINOPHEN 5; 325 MG/1; MG/1
1-2 TABLET ORAL EVERY 4 HOURS PRN
Status: DISCONTINUED | OUTPATIENT
Start: 2020-03-26 | End: 2020-03-27 | Stop reason: HOSPADM

## 2020-03-26 RX ORDER — CYCLOBENZAPRINE HCL 10 MG
10 TABLET ORAL EVERY 8 HOURS PRN
Status: DISCONTINUED | OUTPATIENT
Start: 2020-03-26 | End: 2020-03-27 | Stop reason: HOSPADM

## 2020-03-26 RX ORDER — HYDROMORPHONE HYDROCHLORIDE 1 MG/ML
0.5 INJECTION, SOLUTION INTRAMUSCULAR; INTRAVENOUS; SUBCUTANEOUS
Status: DISCONTINUED | OUTPATIENT
Start: 2020-03-26 | End: 2020-03-26 | Stop reason: CLARIF

## 2020-03-26 RX ORDER — LEVOTHYROXINE SODIUM 100 UG/1
100 TABLET ORAL DAILY
Status: DISCONTINUED | OUTPATIENT
Start: 2020-03-27 | End: 2020-03-27 | Stop reason: HOSPADM

## 2020-03-26 RX ADMIN — MAGNESIUM SULFATE IN DEXTROSE 1 G: 10 INJECTION, SOLUTION INTRAVENOUS at 10:24

## 2020-03-26 RX ADMIN — KETOROLAC TROMETHAMINE 30 MG: 30 INJECTION, SOLUTION INTRAMUSCULAR at 10:22

## 2020-03-26 RX ADMIN — HYDROMORPHONE HYDROCHLORIDE 0.5 MG: 1 INJECTION, SOLUTION INTRAMUSCULAR; INTRAVENOUS; SUBCUTANEOUS at 11:03

## 2020-03-26 RX ADMIN — DIAZEPAM 10 MG: 5 INJECTION, SOLUTION INTRAMUSCULAR; INTRAVENOUS at 09:15

## 2020-03-26 RX ADMIN — HYDROMORPHONE HYDROCHLORIDE 0.5 MG: 1 INJECTION, SOLUTION INTRAMUSCULAR; INTRAVENOUS; SUBCUTANEOUS at 14:10

## 2020-03-26 RX ADMIN — HYDROCODONE BITARTRATE AND ACETAMINOPHEN 2 TABLET: 5; 325 TABLET ORAL at 20:24

## 2020-03-26 RX ADMIN — HYDROCODONE BITARTRATE AND ACETAMINOPHEN 2 TABLET: 5; 325 TABLET ORAL at 16:04

## 2020-03-26 RX ADMIN — CYCLOBENZAPRINE HYDROCHLORIDE 10 MG: 10 TABLET, FILM COATED ORAL at 22:04

## 2020-03-26 ASSESSMENT — MIFFLIN-ST. JEOR: SCORE: 1682.34

## 2020-03-26 NOTE — H&P
[facility]    History and Physical  Hospitalist       Date of Admission:  3/26/2020    Assessment & Plan   Principal Problem:    Intractable back pain    Assessment: back pain 2 days ago. No numbness or tingling on the leg, has hx of bulging disk. MRi was done today and show L4-L5 small central posterior disc herniation     Plan: will do PT, Pain control. With iv morphine and oral. Norco. Will also do flexeril for spasm    Active Problems:    Hypothyroidism    Assessment: on levothyroxine    Plan: continue levothyroxine      Amanda Pittman is a 44 year old female who presents with back pain. Started 2 days ago. Seem to get worse. Pt need to call ambulance this morning since the pain is unbearable. Pt has hx of bulging disk in the past. Pt has no numbness or tingling on the leg. MRI was done today and show L4-L5 small central posterior disc herniation.       # Pain Assessment:  Current Pain Score 3/26/2020   Pain score (0-10) 9   Pain location -   Pain descriptors -   - Amanda is experiencing pain due to back pain. Pain management was discussed and the plan was created in a collaborative fashion.  Amanda's response to the current recommendations: engaged  - Opioid regimen: morphine 1mg q3h iv prn, will also do norco 5/325 po prn q4h  - Response to opioid medications: Reduction of symptoms   - Bowel regimen: miralax        DVT Prophylaxis: Low Risk/Ambulatory with no VTE prophylaxis indicated  Code Status: Full Code    Disposition: Expected discharge in 1-2 days once pain controlled.    Xochitl Rojas    Primary Care Physician   Sonny Warner MD    Chief Complaint   Back pain     History is obtained from the patient    History of Present Illness    Amanda Pittman is a 44 year old female who presents with back pain. Started 2 days ago. Seem to get worse. Pt need to call ambulance this morning since the pain is unbearable. Pt has hx of bulging disk in the past. Pt has no numbness or  tingling on the leg. MRI was done today and show L4-L5 small central posterior disc herniation.       Past Medical History    I have reviewed this patient's medical history and updated it with pertinent information if needed.   Past Medical History:   Diagnosis Date     Cancer (H) 2006    Cervical     Screening for malignant neoplasm of the cervix     took most of cervix out. SANDY 2/3, CIS*     Thyroid disease        Past Surgical History   I have reviewed this patient's surgical history and updated it with pertinent information if needed.  Past Surgical History:   Procedure Laterality Date     BIOPSY  2006    Colposcopy     C  DELIVERY ONLY  2003    , Low Cervical      SECTION N/A 2014    Procedure:  SECTION;  Surgeon: Max Merida MD;  Location: [facility] L[facility]     COLONOSCOPY       HC COLP CERVIX/UPPER VAGINA W BX CERVIX/ENDOCERV CURETT  2006    SANDY 2/3, CIS      HC MARSUP BARTHOLIN GLAND CYST      x4     HC REMOVE TONSILS/ADENOIDS,12+ Y/O      Tonsils 11+y.o. tonsilectomy     LEEP TX, CERVICAL  2006     TONSILLECTOMY  08    Right - sided       Prior to Admission Medications   Prior to Admission Medications   Prescriptions Last Dose Informant Patient Reported? Taking?   SUMAtriptan (IMITREX) 50 MG tablet More than a month at Unknown time  No No   Sig: Take 1 tablet (50 mg) by mouth at onset of headache for migraine   aspirin-acetaminophen-caffeine (EXCEDRIN MIGRAINE) 250-250-65 MG tablet Past Month at Unknown time  Yes Yes   Sig: Take 1 tablet by mouth every 6 hours as needed for headaches   ibuprofen (ADVIL,MOTRIN) 800 MG tablet 3/26/2020 at 0700  No Yes   Sig: Take 1 tablet (800 mg) by mouth every 8 hours as needed for moderate pain   levothyroxine (SYNTHROID/LEVOTHROID) 100 MCG tablet 3/26/2020 at 0700  No Yes   Sig: TAKE 1 TABLET BY MOUTH  DAILY      Facility-Administered Medications: None     Allergies   Allergies   Allergen Reactions      Strawberry Other (See Comments)     Sores in mouth     Percocet [Oxycodone-Acetaminophen] Other (See Comments)     Tongue swelling and hives       Social History   I have reviewed this patient's social history and updated it with pertinent information if needed. Amanda Pittman  reports that she has never smoked. She has never used smokeless tobacco. She reports that she does not drink alcohol or use drugs.    Family History   I have reviewed this patient's family history and updated it with pertinent information if needed.   Family History   Problem Relation Age of Onset     Cancer Maternal Grandmother         uterus cancer     Heart Disease Paternal Grandfather          of MI     Heart Disease Maternal Grandfather      Cancer Mother 72        myeloma     Other Cancer Mother         Multiple myeloma 2015     Heart Disease Father        Review of Systems   The 10 point Review of Systems is negative other than noted in the HPI or here.     Physical Exam   Temp: 98.5  F (36.9  C) Temp src: Oral BP: 122/66 Pulse: 61   Resp: 18 SpO2: 98 % O2 Device: None (Room air)    Vital Signs with Ranges  Temp:  [98.5  F (36.9  C)] 98.5  F (36.9  C)  Pulse:  [60-68] 61  Resp:  [18] 18  BP: (103-122)/(66-81) 122/66  SpO2:  [94 %-100 %] 98 %  235 lbs 7.22 oz    Constitutional: alert, oriented, moderate distress  Eyes: PERRLA  HEENT: normocephalic atraumatic  Respiratory: clear to auscultation bilaterally  Cardiovascular: regular rate and rhythm  GI: supple, non tender, non distended. Normal bowel sound  Lymph/Hematologic: no lymph node enlargement  Genitourinary: not examined  Skin: no rash or bruise  Musculoskeletal: back spasm noted on paraspinal muscle area, tender to touch  Neurologic: alert, oriented, normal speech, strength in lower extremity decrease but seem to be from the pain   Psychiatric: affect normal    Data     Data reviewed today:    No results found for this or any previous visit (from the past 168 hour(s)).   Recent  Results (from the past 24 hour(s))   Lumbar spine MRI w/o contrast    Narrative    MRI LUMBAR SPINE WITHOUT CONTRAST March 26, 2020 12:40 PM     HISTORY: Atraumatic severe intractable back pain with radicular  symptoms into the right buttock for two days.    TECHNIQUE: Multiplanar multisequence MRI of the lumbar spine without  contrast.    COMPARISON: CT scan 11/12/2015.    FINDINGS: Previous CT scan shows five lumbar-type vertebral bodies.  Normal vertebral body heights.  Normal overall bone marrow signal. The  distal spinal cord and cauda equina appear normal. No extraspinal  abnormality is noted.     T12-L1: Normal disc, facet joints, spinal canal and neural foramina.      L1-L2: Normal disc, facet joints, spinal canal and neural foramina.      L2-L3: Normal disc, facet joints, spinal canal and neural foramina.      L3-L4: Normal disc, facet joints, spinal canal and neural foramina.     L4-L5: Decreased T2 signal in the disc. Central posterior disc  herniation causing mild impression on the thecal sac. Normal facet  joints, spinal canal and neural foramina.    L5-S1: Normal disc, facet joints, spinal canal and neural foramina.        Impression    IMPRESSION: L4-L5 small central posterior disc herniation causing mild  impression on the thecal sac. No change.    SUSIE DECKER MD

## 2020-03-26 NOTE — ED PROVIDER NOTES
History     Chief Complaint   Patient presents with     Back Pain     HPI  Amanda Pittman is a 44 year old female who presents with atraumatic moderate to severe lumbar back pain.  States she had previous back issues years ago but improved with steroids and treatments.  Patient states last 2 days she developed some low back discomfort.  However today she went to stand from a chair and had severe back pain and spasm bringing her to her knees.  Pain initially did go down her legs but that is completely resolved.  No loss of bowel or bladder.  No saddle paresthesias.  Denies any abdominal pain, nausea or vomiting.  She is had no dysuria or frequency.  No fever or chills.  He took some ibuprofen at home.  Given Dilaudid in route by paramedics with some improvement.  Have a CT back in  showing a L4-L5 central disc protrusion that was mildly effacing the thecal sac but did not cause significant canal stenosis or foraminal narrowing.    Allergies:  Allergies   Allergen Reactions     Casselberry Other (See Comments)     Sores in mouth     Percocet [Oxycodone-Acetaminophen] Other (See Comments)     Tongue swelling and hives       Problem List:    Patient Active Problem List    Diagnosis Date Noted     CMC (carpometacarpal joint) sprains, right, initial encounter 2019     Priority: Medium     Status post  delivery 2014     Priority: Medium     Diagnosis updated by automated process. Provider to review and confirm.       History of cone biopsy of cervix complicating pregnancy 05/10/2014     Priority: Medium     S/P LEEP (loop electrosurgical excision procedure) 2012     Priority: Medium     02 pap ASCUS neg. HPV  02 pap-benign cellular changes  03 pap NIL  03 pap NIL  06 pap ASC-H. + HPV  3/16/06 colposcopy- bx = SANDY 3/CIS with gland involvement  06 CKC and endometrial currettings- SANDY 2/3 with negative margins. Negative embx.  06 NIL  10/11/06 NIL  07 NIL,  07 NIL, 12/3/08 NIL, 09 NIL  3/22/11 NIL  12 NIL  14 NIL pap, neg HR HPV. Plan: cotest in 1 yr  19 NIL pap, neg HR HPV. Plan: Cotest in 3 years.             CARDIOVASCULAR SCREENING; LDL GOAL LESS THAN 160 10/31/2010     Priority: Medium     Female infertility 2009     Priority: Medium     Record updated by IMO load, please review for accuracy.       Infertility management 2009     Priority: Medium     Hypothyroidism 2007     Priority: Medium     Problem list name updated by automated process. Provider to review          Past Medical History:    Past Medical History:   Diagnosis Date     Cancer (H) 2006     Screening for malignant neoplasm of the cervix 2006     Thyroid disease        Past Surgical History:    Past Surgical History:   Procedure Laterality Date     BIOPSY  2006    Colposcopy     C  DELIVERY ONLY  2003    , Low Cervical      SECTION N/A 2014    Procedure:  SECTION;  Surgeon: Max Merida MD;  Location: [facility] L[facility]     COLONOSCOPY       HC COLP CERVIX/UPPER VAGINA W BX CERVIX/ENDOCERV CURETT  2006    SANDY 2/3, CIS      HC MARSUP BARTHOLIN GLAND CYST      x4     HC REMOVE TONSILS/ADENOIDS,12+ Y/O      Tonsils 11+y.o. tonsilectomy     LEEP TX, CERVICAL  2006     TONSILLECTOMY  08    Right - sided       Family History:    Family History   Problem Relation Age of Onset     Cancer Maternal Grandmother         uterus cancer     Heart Disease Paternal Grandfather          of MI     Heart Disease Maternal Grandfather      Cancer Mother 72        myeloma     Other Cancer Mother         Multiple myeloma 2015     Heart Disease Father        Social History:  Marital Status:   [2]  Social History     Tobacco Use     Smoking status: Never Smoker     Smokeless tobacco: Never Used     Tobacco comment: used to smoke occ   Substance Use Topics     Alcohol use: No     Drug use: No        Medications:     aspirin-acetaminophen-caffeine (EXCEDRIN MIGRAINE) 250-250-65 MG tablet  ibuprofen (ADVIL,MOTRIN) 800 MG tablet  levothyroxine (SYNTHROID/LEVOTHROID) 100 MCG tablet  SUMAtriptan (IMITREX) 50 MG tablet          Review of Systems all other systems are reviewed and are negative.    Physical Exam   BP: 119/79  Pulse: 61  Temp: 98.5  F (36.9  C)  Resp: 18  Weight: 106.8 kg (235 lb 7.2 oz)  SpO2: 99 %      Physical Exam general alert cooperative female in moderate distress.  When she makes attempts to moves I can examine her back she has significant spasm and she is unable to do so.  She is diffusely tender in the musculature of the low back.  She has no skin rash or lesion over the back.  She is moderately tender over the SI joints bilaterally with the right being greater than left.  She has intact pulses and sensation for the lower extremities.  On limited lower extremity exam she has intact strength for dorsiflexion of the foot, great toe, and plantar flexion.  She is able to flex and extend at the knee and hip but that increases her back discomfort.  No skin rash of the back or down the legs.    ED Course        Procedures               Critical Care time:  none               Results for orders placed or performed during the hospital encounter of 03/26/20 (from the past 24 hour(s))   Lumbar spine MRI w/o contrast    Narrative    MRI LUMBAR SPINE WITHOUT CONTRAST March 26, 2020 12:40 PM     HISTORY: Atraumatic severe intractable back pain with radicular  symptoms into the right buttock for two days.    TECHNIQUE: Multiplanar multisequence MRI of the lumbar spine without  contrast.    COMPARISON: CT scan 11/12/2015.    FINDINGS: Previous CT scan shows five lumbar-type vertebral bodies.  Normal vertebral body heights.  Normal overall bone marrow signal. The  distal spinal cord and cauda equina appear normal. No extraspinal  abnormality is noted.     T12-L1: Normal disc, facet joints, spinal canal and neural foramina.       L1-L2: Normal disc, facet joints, spinal canal and neural foramina.      L2-L3: Normal disc, facet joints, spinal canal and neural foramina.      L3-L4: Normal disc, facet joints, spinal canal and neural foramina.     L4-L5: Decreased T2 signal in the disc. Central posterior disc  herniation causing mild impression on the thecal sac. Normal facet  joints, spinal canal and neural foramina.    L5-S1: Normal disc, facet joints, spinal canal and neural foramina.        Impression    IMPRESSION: L4-L5 small central posterior disc herniation causing mild  impression on the thecal sac. No change.    SUSIE DECKER MD       Medications   HYDROmorphone (PF) (DILAUDID) injection 0.5 mg (0.5 mg Intravenous Given 3/26/20 1103)   diazepam (VALIUM) injection 10 mg (10 mg Intravenous Given 3/26/20 0915)   ketorolac (TORADOL) injection 30 mg (30 mg Intravenous Given 3/26/20 1022)   magnesium sulfate 1 gm in 100mL D5W intermittent infusion (0 g Intravenous Stopped 3/26/20 1102)     IV was established patient was given Valium.  She had some improvement initially but then had recurrence of her pain.  Despite repeated doses of Dilaudid, Valium, Toradol, and magnesium patient had persistence in her pain.  At one point we are able to get her into a wheelchair to get to the MRI but thereafter she was unable to move adequately or ambulate.  Assessments & Plan (with Medical Decision Making)   Amanda Pittmna is a 44 year old female who presents with atraumatic moderate to severe lumbar back pain.  States she had previous back issues years ago but improved with steroids and treatments.  Patient states last 2 days she developed some low back discomfort.  However today she went to stand from a chair and had severe back pain and spasm bringing her to her knees.  Pain initially did go down her legs but that is completely resolved.  No loss of bowel or bladder.  No saddle paresthesias.  Denies any abdominal pain, nausea or vomiting.  She is had  no dysuria or frequency.  No fever or chills.  He took some ibuprofen at home.  Given Dilaudid in route by paramedics with some improvement.  Have a CT back in 2015 showing a L4-L5 central disc protrusion that was mildly effacing the thecal sac but did not cause significant canal stenosis or foraminal narrowing.  On exam patient had diffuse lumbar muscular tenderness.  Tenderness over the SI joints bilaterally with right being greater than left.  Does not have radicular leg pain or swelling.  Negative Homans.  Intact sensation and pulses.  Despite repeated doses of pain meds and muscle relaxants she continued to have debilitating back pain.  An MRI was obtained see if there is any acute change and this was unchanged from an CT done back in 2015.  She still has an L4-L5 small central posterior disc herniation causing mild compression of the thecal sac.  Otherwise no acute findings.  With the patient unable to ambulate she is unable go home.  Will admit her for pain control and control of the muscle spasm.  Consider physical therapy.   in the hospital service was apprised and will follow on admission.  I have reviewed the nursing notes.    I have reviewed the findings, diagnosis, plan and need for follow up with the patient.       New Prescriptions    No medications on file       Final diagnoses:   Intractable back pain   Back muscle spasm       3/26/2020   [facility] EMERGENCY DEPARTMENT     George Hurst MD  03/26/20 1400

## 2020-03-26 NOTE — ED NOTES
ED Nursing criteria listed below was addressed during verbal handoff:     Abnormal vitals: Yes  Abnormal results: Yes  Med Reconciliation completed: Yes  Meds given in ED: Yes  Any Overdue Meds: No  Core Measures: No  Isolation: No  Special needs: No  Skin assessment: Yes    Observation Patient  Education provided: Yes, she verbalized understanding of obs

## 2020-03-26 NOTE — PROGRESS NOTES
"S-(situation): Patient registered to Observation. Patient arrived to room 274 via cart from ED @ 1550.    B-(background): Back Pain    A-(assessment): VSS. Pt on room air. Skin intact. IV saline locked. Lower back pain rated at a 9/10. Pt says \"its a dull ache and spasms that are sharp.\" Will continue to monitor.    R-(recommendations): Orders and observation goals reviewed with patient.    Nursing Observation criteria listed below was met:    Skin issues/needs documented:Yes  Isolation needs addressed, if appropriate: NA  Fall Prevention: Education given and documented: Yes  Education Assessment documented:Yes  Education Documented (Pre-existing chronic infection such as, MRSA/VRE need education on admission): Yes  OBS video/handout Reviewed & DocumentedYes  Medication Reconciliation Complete: Yes  New medication patient education completed and documented (Possible Side Effects of Common Medications handout): Yes  Home medications if not able to send immediately home with family stored here: Yes  Reminder note placed in discharge instructions: NA  Patient has discharge needs (If yes, please explain): No            "

## 2020-03-26 NOTE — ED TRIAGE NOTES
She started having low back pain 2 days ago.  This morning it's radiating down both legs and unable to get up.  She received 1 mg dilaudid en route per ems which helped some.

## 2020-03-27 ENCOUNTER — TELEPHONE (OUTPATIENT)
Dept: FAMILY MEDICINE | Facility: OTHER | Age: 45
End: 2020-03-27

## 2020-03-27 ENCOUNTER — APPOINTMENT (OUTPATIENT)
Dept: PHYSICAL THERAPY | Facility: CLINIC | Age: 45
End: 2020-03-27
Attending: HOSPITALIST
Payer: COMMERCIAL

## 2020-03-27 VITALS
HEIGHT: 66 IN | OXYGEN SATURATION: 98 % | WEIGHT: 225 LBS | RESPIRATION RATE: 18 BRPM | TEMPERATURE: 98.5 F | DIASTOLIC BLOOD PRESSURE: 62 MMHG | SYSTOLIC BLOOD PRESSURE: 115 MMHG | BODY MASS INDEX: 36.16 KG/M2 | HEART RATE: 66 BPM

## 2020-03-27 LAB
ANION GAP SERPL CALCULATED.3IONS-SCNC: 2 MMOL/L (ref 3–14)
BUN SERPL-MCNC: 10 MG/DL (ref 7–30)
CALCIUM SERPL-MCNC: 8.3 MG/DL (ref 8.5–10.1)
CHLORIDE SERPL-SCNC: 113 MMOL/L (ref 94–109)
CO2 SERPL-SCNC: 25 MMOL/L (ref 20–32)
CREAT SERPL-MCNC: 0.95 MG/DL (ref 0.52–1.04)
ERYTHROCYTE [DISTWIDTH] IN BLOOD BY AUTOMATED COUNT: 12.5 % (ref 10–15)
GFR SERPL CREATININE-BSD FRML MDRD: 72 ML/MIN/{1.73_M2}
GLUCOSE SERPL-MCNC: 92 MG/DL (ref 70–99)
HCT VFR BLD AUTO: 42.2 % (ref 35–47)
HGB BLD-MCNC: 14.2 G/DL (ref 11.7–15.7)
MCH RBC QN AUTO: 31.3 PG (ref 26.5–33)
MCHC RBC AUTO-ENTMCNC: 33.6 G/DL (ref 31.5–36.5)
MCV RBC AUTO: 93 FL (ref 78–100)
PLATELET # BLD AUTO: 288 10E9/L (ref 150–450)
POTASSIUM SERPL-SCNC: 4.3 MMOL/L (ref 3.4–5.3)
RBC # BLD AUTO: 4.53 10E12/L (ref 3.8–5.2)
SODIUM SERPL-SCNC: 140 MMOL/L (ref 133–144)
WBC # BLD AUTO: 10.8 10E9/L (ref 4–11)

## 2020-03-27 PROCEDURE — 25000132 ZZH RX MED GY IP 250 OP 250 PS 637: Performed by: FAMILY MEDICINE

## 2020-03-27 PROCEDURE — 25000132 ZZH RX MED GY IP 250 OP 250 PS 637: Performed by: HOSPITALIST

## 2020-03-27 PROCEDURE — 80048 BASIC METABOLIC PNL TOTAL CA: CPT | Performed by: HOSPITALIST

## 2020-03-27 PROCEDURE — 85027 COMPLETE CBC AUTOMATED: CPT | Performed by: HOSPITALIST

## 2020-03-27 PROCEDURE — 97530 THERAPEUTIC ACTIVITIES: CPT | Mod: GP | Performed by: PHYSICAL THERAPIST

## 2020-03-27 PROCEDURE — 97162 PT EVAL MOD COMPLEX 30 MIN: CPT | Mod: GP | Performed by: PHYSICAL THERAPIST

## 2020-03-27 PROCEDURE — 36415 COLL VENOUS BLD VENIPUNCTURE: CPT | Performed by: HOSPITALIST

## 2020-03-27 PROCEDURE — 97110 THERAPEUTIC EXERCISES: CPT | Mod: GP | Performed by: PHYSICAL THERAPIST

## 2020-03-27 PROCEDURE — G0378 HOSPITAL OBSERVATION PER HR: HCPCS

## 2020-03-27 PROCEDURE — 99217 ZZC OBSERVATION CARE DISCHARGE: CPT | Performed by: FAMILY MEDICINE

## 2020-03-27 PROCEDURE — 25000131 ZZH RX MED GY IP 250 OP 636 PS 637: Performed by: FAMILY MEDICINE

## 2020-03-27 RX ORDER — CYCLOBENZAPRINE HCL 10 MG
10 TABLET ORAL 3 TIMES DAILY PRN
Qty: 30 TABLET | Refills: 0 | Status: SHIPPED | OUTPATIENT
Start: 2020-03-27 | End: 2020-04-09

## 2020-03-27 RX ORDER — PREDNISONE 20 MG/1
40 TABLET ORAL DAILY
Qty: 8 TABLET | Refills: 0 | Status: SHIPPED | OUTPATIENT
Start: 2020-03-28 | End: 2020-04-01

## 2020-03-27 RX ORDER — HYDROCODONE BITARTRATE AND ACETAMINOPHEN 5; 325 MG/1; MG/1
1-2 TABLET ORAL EVERY 4 HOURS PRN
Qty: 36 TABLET | Refills: 0 | Status: SHIPPED | OUTPATIENT
Start: 2020-03-27 | End: 2020-04-09

## 2020-03-27 RX ORDER — DOCUSATE SODIUM 100 MG/1
100 CAPSULE, LIQUID FILLED ORAL 2 TIMES DAILY PRN
COMMUNITY
Start: 2020-03-27 | End: 2020-10-14

## 2020-03-27 RX ORDER — POLYETHYLENE GLYCOL 3350 17 G/17G
17 POWDER, FOR SOLUTION ORAL DAILY PRN
COMMUNITY
Start: 2020-03-27 | End: 2020-10-14

## 2020-03-27 RX ORDER — ACETAMINOPHEN 325 MG/1
650 TABLET ORAL EVERY 4 HOURS PRN
COMMUNITY
Start: 2020-03-27 | End: 2023-05-31

## 2020-03-27 RX ORDER — PREDNISONE 20 MG/1
40 TABLET ORAL DAILY
Status: DISCONTINUED | OUTPATIENT
Start: 2020-03-27 | End: 2020-03-27 | Stop reason: HOSPADM

## 2020-03-27 RX ADMIN — HYDROCODONE BITARTRATE AND ACETAMINOPHEN 2 TABLET: 5; 325 TABLET ORAL at 00:38

## 2020-03-27 RX ADMIN — PREDNISONE 40 MG: 20 TABLET ORAL at 11:02

## 2020-03-27 RX ADMIN — LEVOTHYROXINE SODIUM 100 MCG: 100 TABLET ORAL at 07:51

## 2020-03-27 RX ADMIN — CYCLOBENZAPRINE HYDROCHLORIDE 10 MG: 10 TABLET, FILM COATED ORAL at 08:25

## 2020-03-27 RX ADMIN — HYDROCODONE BITARTRATE AND ACETAMINOPHEN 1 TABLET: 5; 325 TABLET ORAL at 09:46

## 2020-03-27 RX ADMIN — ACETAMINOPHEN 650 MG: 325 TABLET, FILM COATED ORAL at 08:25

## 2020-03-27 RX ADMIN — HYDROCODONE BITARTRATE AND ACETAMINOPHEN 2 TABLET: 5; 325 TABLET ORAL at 04:38

## 2020-03-27 NOTE — DISCHARGE INSTRUCTIONS
Dr. Warner team will be calling you to set up your phone visit.    If you do not hear from them with in 48 hours please call the clinic.    [phone]

## 2020-03-27 NOTE — PROGRESS NOTES
03/27/20 0850   Quick Adds   Type of Visit Initial PT Evaluation       Present no   Living Environment   Lives With child(albertina), adult;child(albertina), dependent  (2 kids)   Living Arrangements house   Home Accessibility stairs to enter home;stairs within home   Number of Stairs, Main Entrance 1   Stair Railings, Main Entrance none   Number of Stairs, Within Home, Primary   (13 upstairs)   Stair Railings, Within Home, Primary railing on left side (ascending)   Transportation Anticipated family or friend will provide   Living Environment Comment step over tub   Self-Care   Usual Activity Tolerance excellent   Current Activity Tolerance fair   Regular Exercise No   Functional Level Prior   Ambulation 0-->independent   Transferring 0-->independent   Toileting 0-->independent   Bathing 0-->independent   Communication 0-->understands/communicates without difficulty   Swallowing 0-->swallows foods/liquids without difficulty   Cognition 0 - no cognition issues reported   Fall history within last six months no   Which of the above functional risks had a recent onset or change? ambulation   General Information   Onset of Illness/Injury or Date of Surgery - Date 03/26/20   Referring Physician Dr. Rojas   Patient/Family Goals Statement home    Pertinent History of Current Problem (include personal factors and/or comorbidities that impact the POC) Patient is a 44 year old female, registered OBSERVATION status, presented to the ED with intractable back pain. Patient found to have a small L4-L5 central posterior disc herniation. Patient reports unclear onset of back pain, admits to change in desk chair working at home, moving some funiture. The specific episode began following a twisting to get out of chair and stand.    Precautions/Limitations no known precautions/limitations   Weight-Bearing Status - LUE full weight-bearing   Weight-Bearing Status - RUE full weight-bearing   Weight-Bearing Status - LLE full  weight-bearing   Weight-Bearing Status - RLE full weight-bearing   General Info Comments PT orders: eval and treat back pain. Activity orders: up ad alfredito.    Cognitive Status Examination   Orientation orientation to person, place and time   Level of Consciousness alert   Follows Commands and Answers Questions 100% of the time;able to follow multistep instructions   Personal Safety and Judgment intact   Memory intact   Pain Assessment   Patient Currently in Pain Yes, see Vital Sign flowsheet  (7/10 low back above buttock, both sides)   Integumentary/Edema   Integumentary/Edema no deficits were identifed   Posture    Posture Forward head position   Range of Motion (ROM)   ROM Comment WFL UE and LE, guarded LE motion. Lumbar extension 5% flexion 20%   Strength   Strength Comments unable to assess formally due to muscle spasms, WFL for mobility   Bed Mobility   Bed Mobility Bed mobility skill: Rolling/Turning;Bed mobility skill: Sit to supine;Bed mobility skill: Supine to sit;Bed mobility skill: Scooting/Bridging   Bed Mobility Skill: Rolling/Turning   Level of [place]: Rolling/Turning independent   Physical/Nonphysical Assist: Rolling/Turning verbal cues   Bed Mobility Skill: Scooting/Bridging   Level of [place]: Scooting/Bridging independent   Physical/Nonphysical Assist: Scooting/Bridging verbal cues   Bed Mobility Skill: Sit to Supine   Level of [place]: Sit/Supine independent   Physical Assist/Nonphysical Assist: Sit/Supine verbal cues   Bed Mobility Skill: Supine to Sit   Level of [place]: Supine/Sit independent   Physical Assist/Nonphysical Assist: Supine/Sit verbal cues   Transfer Skills   Transfer Transfer Skill: Sit to Stand;Transfer Skill:  Stand to Sit   Transfer Skill:  Sit to Stand   Level of [place]: Sit/Stand independent   Transfer Skill: Stand to Sit   Level of [place]: Stand/Sit independent   Gait   Gait Gait Skill;Stairs;Gait Analysis   Gait Skills   Level of [place]:  Gait independent   Weight-Bearing Restrictions: Gait full weight-bearing   Gait Distance 150 feet   Gait Analysis   Gait Pattern Used swing-through gait   Gait Deviations Noted decreased toe-to-floor clearance;decreased step length;decreased stride length;decreased brien  (no pelvic nutation)   Impairments Contributing to Gait Deviations pain   Stairs   Self Performance Stand by assist   Physical/Nonphysical Assist: Stairs Set-up or supervision only   Rails 1 rail   Indicate number of stairs 2   Balance   Balance Comments No LOB with mobility, only distrubances in postural control were observed upon back spasms. good self awareness and stategies to maintain balance   Sensory Examination   Sensory Perception no deficits were identified   Coordination   Coordination no deficits were identified   Muscle Tone   Muscle Tone Comments back spasms    Modality Interventions   Planned Modality Interventions Cryotherapy  (hot back)   Planned Modality Interventions Comments low back PRN   General Therapy Interventions   Planned Therapy Interventions ROM;stretching;bed mobility training;transfer training;home program guidelines   Clinical Impression   Criteria for Skilled Therapeutic Intervention yes, treatment indicated   PT Diagnosis acute exacerbation of chronic low back pain, spine joint stifffness, neural tension, core weakness   Influenced by the following impairments faulty movement pattern, weakness   Functional limitations due to impairments pain, inability to complete baseline functional mobility without dysfunction   Clinical Presentation Evolving/Changing   Clinical Presentation Rationale severe pain, fear of pain, chronic nature, provoking activities, clinical judgement   Clinical Decision Making (Complexity) Moderate complexity   Therapy Frequency   (One time evaluation and treatment)   Predicted Duration of Therapy Intervention (days/wks) 1 days   Anticipated Equipment Needs at Discharge   (electric heat pad)  "  Anticipated Discharge Disposition Home with Assist   Risk & Benefits of therapy have been explained Yes   Patient, Family & other staff in agreement with plan of care Yes   Clinical Impression Comments Patient able to complete functional mobility safely however with increased pain. Patient receptive to education, anticipate fair tolerance at home. Patient would benefit from follow up with outpatient physical therapy to address chronic back pain for improved quality of life.    [facility] AM-PAC TM \"6 Clicks\"   2016, Trustees of [facility], under license to [employer].  All rights reserved.   6 Clicks Short Forms Basic Mobility Inpatient Short Form   [facility] AM-PAC  \"6 Clicks\" V.2 Basic Mobility Inpatient Short Form   1. Turning from your back to your side while in a flat bed without using bedrails? 4 - None   2. Moving from lying on your back to sitting on the side of a flat bed without using bedrails? 4 - None   3. Moving to and from a bed to a chair (including a wheelchair)? 4 - None   4. Standing up from a chair using your arms (e.g., wheelchair, or bedside chair)? 4 - None   5. To walk in hospital room? 4 - None   6. Climbing 3-5 steps with a railing? 4 - None   Basic Mobility Raw Score (Score out of 24.Lower scores equate to lower levels of function) 24   Total Evaluation Time   Total Evaluation Time (Minutes) 15     Thank you for your referral.  Melinda Frankel, PT, DPT, ATC    [facility]  O: [phone]  E: [email or web address]      "

## 2020-03-27 NOTE — PLAN OF CARE
VSS. A&O x4. Skin intact. IV saline locked. Pt reports pain 9/10 throughout shift in the lower back. Norco x2 without a change in pain. Flexeril ordered and given x1. Ice pack also applied to lower back. Pt SBA. Voiding adequately and maintaining good oral intake. Will continue to monitor.

## 2020-03-27 NOTE — DISCHARGE SUMMARY
[facility]  Hospitalist Discharge Summary       Date of Admission:  3/26/2020  Date of Discharge:  3/27/2020  Discharging Provider: Melany Cantu MD  Discharge Diagnoses   Principal Problem:    Intractable back pain  Active Problems:    Hypothyroidism    Back pain    Follow-ups Needed After Discharge   Follow-up Appointments     Follow-up and recommended labs and tests       Follow up with primary care provider, Sonny Warner MD, with a   telephone visit within 7 days for hospital follow- up.           Discharge Disposition   Discharged to home  Condition at discharge: Stable    Hospital Course   Patient is a 44-year-old female with a past medical history of acute back pain in 2015 at which time L4-L5 small central posterior disc herniation was identified who developed a sudden onset acute back pain without known injury 2 days ago that became severe was not adequately controlled with over-the-counter pain medicine who presented to the ED via EMS work-up, including a lumbar  due to intractable pain.  Spine MRI, does not show any acute change with ongoing disc herniation unchanged from 2015.  Patient was registered to observation for symptom control was achieved with initiation of Norco and Flexeril.  Given intermittent radicular symptoms, decision to initiate steroid was also made with patient receiving first dose prior to discharge.  She was seen by physical therapy and is cleared to discharge home with consideration of outpatient PT if the regimen given to her at time of discharge is not working well enough to improve her symptoms.  She is discharged home in stable condition    Principal Problem:    Intractable back pain    Assessment: Sudden onset 2 days ago with symptoms well controlled with Norco and Flexeril and initiation of prednisone given mild intermittent radicular symptoms and patient performing well with physical therapy    Plan: We will discharge with  an ongoing prednisone burst of 5 days, ongoing PRN Norco and Flexeril to help with pain management.  She has been given a physical therapy home regimen to initiate and will connect with her primary care provider in the near future for reevaluation and determination as to whether or not outpatient physical therapy will be needed going forward.      Active Problems:    Hypothyroidism    Assessment: Chronic and stable    Plan:  Discharge home without change to home regimen    Consultations This Hospital Stay   PHYSICAL THERAPY ADULT IP CONSULT    Code Status   Full Code    Time Spent on this Encounter   I, Melany Cantu MD, personally saw the patient today and spent greater than 30 minutes discharging this patient.       Melany Cantu MD  [facility]  ______________________________________________________________________    Physical Exam   Vital Signs: Temp: 98.5  F (36.9  C) Temp src: Oral BP: 115/62 Pulse: 66   Resp: 18 SpO2: 98 % O2 Device: None (Room air)    Weight: 225 lbs 0 oz  Constitutional: awake, alert, cooperative, no apparent distress, and appears stated age  Respiratory: No increased work of breathing, good air exchange, clear to auscultation bilaterally, no crackles or wheezing  Cardiovascular: Normal apical impulse, regular rate and rhythm, normal S1 and S2, no S3 or S4, and no murmur noted  GI: bowel sounds present, abdomen soft and non-tender  Skin: no redness, warmth, or swelling and no rashes  Musculoskeletal: no lower extremity pitting edema present  Neurologic: Awake, alert, oriented to name, place and time.         Primary Care Physician   Sonny Warner MD    Discharge Orders      Reason for your hospital stay    Severe lower back pain which has improved with the anti-inflammatory, narcotic and muscle relaxing medications given during this hospital stay and your imaging of your back looks the same as it did in 2015.  Please continue  to take your pain medications as you go home and do the exercises taught to you by the physical therapist.  Enjoy going home!     Follow-up and recommended labs and tests     Follow up with primary care provider, Sonny Warner MD, with a telephone visit within 7 days for hospital follow- up.     Activity    Your activity upon discharge: activity as tolerated     Diet    Follow this diet upon discharge: Regular       Significant Results and Procedures   Results for orders placed or performed during the hospital encounter of 03/26/20   Lumbar spine MRI w/o contrast    Narrative    MRI LUMBAR SPINE WITHOUT CONTRAST March 26, 2020 12:40 PM     HISTORY: Atraumatic severe intractable back pain with radicular  symptoms into the right buttock for two days.    TECHNIQUE: Multiplanar multisequence MRI of the lumbar spine without  contrast.    COMPARISON: CT scan 11/12/2015.    FINDINGS: Previous CT scan shows five lumbar-type vertebral bodies.  Normal vertebral body heights.  Normal overall bone marrow signal. The  distal spinal cord and cauda equina appear normal. No extraspinal  abnormality is noted.     T12-L1: Normal disc, facet joints, spinal canal and neural foramina.      L1-L2: Normal disc, facet joints, spinal canal and neural foramina.      L2-L3: Normal disc, facet joints, spinal canal and neural foramina.      L3-L4: Normal disc, facet joints, spinal canal and neural foramina.     L4-L5: Decreased T2 signal in the disc. Central posterior disc  herniation causing mild impression on the thecal sac. Normal facet  joints, spinal canal and neural foramina.    L5-S1: Normal disc, facet joints, spinal canal and neural foramina.        Impression    IMPRESSION: L4-L5 small central posterior disc herniation causing mild  impression on the thecal sac. No change.    SUSIE DECKER MD       Discharge Medications   Current Discharge Medication List      START taking these medications    Details   acetaminophen  (TYLENOL) 325 MG tablet Take 2 tablets (650 mg) by mouth every 4 hours as needed for mild pain  Qty:        cyclobenzaprine (FLEXERIL) 10 MG tablet Take 1 tablet (10 mg) by mouth 3 times daily as needed for muscle spasms  Qty: 30 tablet, Refills: 0    Associated Diagnoses: Intractable back pain; Back muscle spasm      HYDROcodone-acetaminophen (NORCO) 5-325 MG tablet Take 1-2 tablets by mouth every 4 hours as needed for moderate to severe pain  Qty: 36 tablet, Refills: 0    Associated Diagnoses: Intractable back pain      polyethylene glycol (MIRALAX) packet Take 17 g by mouth daily as needed for constipation  Qty:        predniSONE (DELTASONE) 20 MG tablet Take 2 tablets (40 mg) by mouth daily for 4 days  Qty: 8 tablet, Refills: 0    Associated Diagnoses: Intractable back pain         CONTINUE these medications which have NOT CHANGED    Details   aspirin-acetaminophen-caffeine (EXCEDRIN MIGRAINE) 250-250-65 MG tablet Take 1 tablet by mouth every 6 hours as needed for headaches      docusate sodium (COLACE) 100 MG capsule Take 1 capsule (100 mg) by mouth 2 times daily as needed for constipation      ibuprofen (ADVIL,MOTRIN) 800 MG tablet Take 1 tablet (800 mg) by mouth every 8 hours as needed for moderate pain  Qty: 90 tablet, Refills: 1    Associated Diagnoses: S/P caesarean section      levothyroxine (SYNTHROID/LEVOTHROID) 100 MCG tablet TAKE 1 TABLET BY MOUTH  DAILY  Qty: 90 tablet, Refills: 3    Associated Diagnoses: Acquired hypothyroidism      SUMAtriptan (IMITREX) 50 MG tablet Take 1 tablet (50 mg) by mouth at onset of headache for migraine  Qty: 12 tablet, Refills: 1    Associated Diagnoses: Intractable migraine with aura without status migrainosus           Allergies   Allergies   Allergen Reactions     [place] Other (See Comments)     Sores in mouth     Percocet [Oxycodone-Acetaminophen] Other (See Comments)     Tongue swelling and hives

## 2020-03-27 NOTE — TELEPHONE ENCOUNTER
Patient called to schedule an appointment for a hospital follow-up or appeared on a report showing that they were recently discharged from the hospital.    Patient was admitted to [facility]  Discharged date: 03/27/2020  Reason for hospital admission:  intractable back pain, back mucsle spasm  Does patient have future appointment scheduled with provider? No  Date of future appointment:        This information will be used to help the care team plan for the patients upcoming visit.  The triage RN may determine that a follow up call is necessary and reach out to the patient via the phone number listed in the chart.     Please route this message on routine priority to the Triage RN pool.

## 2020-03-27 NOTE — PROGRESS NOTES
S-(situation): shift note    B-(background): low back pain    A-(assessment): vss. Pt has pain across low back. Does not have pain when resting in bed if not moving, but with any movement pt has pain. Ice pack applied and Norco given every four hours for pain control and has been effective with decrease in pain with movement. No tingling or numbness, voiding without difficulty.     R-(recommendations): will continue with pain control, PT consult today.

## 2020-03-27 NOTE — PROGRESS NOTES
S-(situation): Patient discharged home via wheelchair with .    B-(background): Observation goals met    A-(assessment): decrease pain with Norco and flexeril. Patient up to bathroom with minimal assist.     R-(recommendations): Patient discharged to home. Will follow up with PCP in a week.     Emery Hayes RN

## 2020-03-27 NOTE — PROVIDER NOTIFICATION
Patient has had norco x2 and is still rating her pain at 9/10.  Ice packs being used.  Patient states it is a more of a muscle spasm and is requesting a muscle relaxer.  MD notified.

## 2020-03-27 NOTE — PLAN OF CARE
Discharge Planner PT   Patient plan for discharge: Home   Current status: Patient is a 44 year old female, registered OBSERVATION status, presented to the ED with intractable back pain. Patient found to have a small L4-L5 central posterior disc herniation. Patient reports unclear onset of back pain, admits to change in desk chair working at home, moving some funiture. The specific episode began following a twisting to get out of chair and stand. Patient lives at home with her  and two kids, in a single family home with 1 stair to enter and 13 stairs with left handrail to access upper level living. She was IND in mobility and cares prior. She reports a severe episode of low back pain in 2015 and frequent recurrent back pain episodes that are manageable. She has not see PT formally for this date, however did receive exercises and PT referral which she did not follow up on. Currently patient without radicular symptoms, 7/10 pain in bed no pain medications since early this morning, strength impaired due to lumbar spasms, hamstring flexibility limited bilaterally and lumbar range of motion severely restricted: extension 5% and flexion 20% with +gowers sign. Instructed in TA bracing and pain science warning flag de-escalation, pain decreased to 5/10. Instructed in neural flossing for sciatic nerve and neural tension, log roll bed mobility, completed with verbal cues and SBA sit to/from supine and rolling, IND short sitting balance with pain 4/10. Educated regarding spine health, body mechanics, ergonomic set up and precautions for back pain management at this time; provided hands outs. IND sit to/from stand. Ambulated 150 ft with SBA, no LOB however pain 7/10. MOD IND ascend/descend 2 stairs with left hand rail, increased pain however no LOB. Completed repeated extension in standing without back spasm, repeated flexion in standing with back spasm pain with each 7/10. Instructed to complete repeated extension 10x  per day at home with core stabilization. Instructed in heating for muscle spasms and walking program.  Barriers to return to prior living situation: None  Recommendations for discharge: Home with assistance, outpatient physical therapy following acute exacerbation.   Rationale for recommendations: Patient able to complete functional mobility safely however with increased pain. Patient receptive to education, anticipate fair tolerance at home. Patient would benefit from follow up with outpatient physical therapy to address chronic back pain for improved quality of life.        Entered by: Melinda Frankel 03/27/2020 9:56 AM        Physical Therapy Discharge Summary    Reason for therapy discharge:    Discharged to home.    Progress towards therapy goal(s). See goals on Care Plan in [facility] electronic health record for goal details.  Goals met    Therapy recommendation(s):    Continued therapy is recommended.  Rationale/Recommendations:  see above .      Thank you for your referral.  Melinda Frankel, PT, DPT, ATC    [facility]  O: [phone]  E: [email or web address]

## 2020-03-30 NOTE — TELEPHONE ENCOUNTER
"Hospital/TCU/ED for chronic condition Discharge Protocol    \"Hi, my name is Shree Musa RN, a registered nurse, and I am calling from [facility].  I am calling to follow up and see how things are going for you after your recent emergency visit/hospital/TCU stay.\"    Tell me how you are doing now that you are home?\" still pretty painful, but staying at home. Doing her home PT stretching and going from there.  Will call back if needed phone visit or refills      Discharge Instructions    \"Let's review your discharge instructions.  What is/are the follow-up recommendations?  Pt. Response: follow up as needed    \"Has an appointment with your primary care provider been scheduled?\"   no,  not at this time    \"When you see the provider, I would recommend that you bring your medications with you.\"    Medications    \"Tell me what changed about your medicines when you discharged?\"    Changes to chronic meds?    0-1    \"What questions do you have about your medications?\"    None     New diagnoses of heart failure, COPD, diabetes, or MI?    No              Post Discharge Medication Reconciliation Status: discharge medications reconciled, continue medications without change.    Was MTM referral placed (*Make sure to put transitions as reason for referral)?   No    Call Summary    \"What questions or concerns do you have about your recent visit and your follow-up care?\"     none    \"If you have questions or things don't continue to improve, we encourage you contact us through the main clinic number (give number).  Even if the clinic is not open, triage nurses are available 24/7 to help you.     We would like you to know that our clinic has extended hours (provide information).  We also have urgent care (provide details on closest location and hours/contact info)\"      \"Thank you for your time and take care!\"             "

## 2020-04-09 ENCOUNTER — MYC REFILL (OUTPATIENT)
Dept: OTHER | Age: 45
End: 2020-04-09

## 2020-04-09 ENCOUNTER — VIRTUAL VISIT (OUTPATIENT)
Dept: FAMILY MEDICINE | Facility: OTHER | Age: 45
End: 2020-04-09
Payer: COMMERCIAL

## 2020-04-09 DIAGNOSIS — M54.9 INTRACTABLE BACK PAIN: ICD-10-CM

## 2020-04-09 DIAGNOSIS — M62.830 BACK MUSCLE SPASM: ICD-10-CM

## 2020-04-09 DIAGNOSIS — M54.16 LUMBAR BACK PAIN WITH RADICULOPATHY AFFECTING RIGHT LOWER EXTREMITY: Primary | ICD-10-CM

## 2020-04-09 PROCEDURE — 99495 TRANSJ CARE MGMT MOD F2F 14D: CPT | Mod: TEL | Performed by: FAMILY MEDICINE

## 2020-04-09 RX ORDER — MELOXICAM 15 MG/1
15 TABLET ORAL DAILY PRN
Qty: 30 TABLET | Refills: 1 | Status: SHIPPED | OUTPATIENT
Start: 2020-04-09 | End: 2020-10-14

## 2020-04-09 RX ORDER — HYDROCODONE BITARTRATE AND ACETAMINOPHEN 5; 325 MG/1; MG/1
1-2 TABLET ORAL EVERY 4 HOURS PRN
Qty: 20 TABLET | Refills: 0 | Status: SHIPPED | OUTPATIENT
Start: 2020-04-09 | End: 2020-10-14

## 2020-04-09 RX ORDER — CYCLOBENZAPRINE HCL 10 MG
10 TABLET ORAL 3 TIMES DAILY PRN
Qty: 30 TABLET | Refills: 0 | Status: SHIPPED | OUTPATIENT
Start: 2020-04-09 | End: 2020-10-14

## 2020-04-09 RX ORDER — HYDROCODONE BITARTRATE AND ACETAMINOPHEN 5; 325 MG/1; MG/1
1-2 TABLET ORAL EVERY 4 HOURS PRN
Qty: 36 TABLET | Refills: 0 | Status: CANCELLED | OUTPATIENT
Start: 2020-04-09

## 2020-04-09 NOTE — TELEPHONE ENCOUNTER
Spoke to patient and advised her of message below. Patient was agreeable to do telephone visit today and is scheduled. Closing encounter.

## 2020-04-09 NOTE — TELEPHONE ENCOUNTER
Pt is overdue for hospital follow up.  Please schedule follow up visit.  Virtual visit of any type  Is OK.

## 2020-04-09 NOTE — PROGRESS NOTES
"Marilu Pittman is a 44 year old female who is being evaluated via a billable telephone visit.      The patient has been notified of following:     \"This telephone visit will be conducted via a call between you and your physician/provider. We have found that certain health care needs can be provided without the need for a physical exam.  This service lets us provide the care you need with a short phone conversation.  If a prescription is necessary we can send it directly to your pharmacy.  If lab work is needed we can place an order for that and you can then stop by our lab to have the test done at a later time.    Telephone visits are billed at different rates depending on your insurance coverage. During this emergency period, for some insurers they may be billed the same as an in-person visit.  Please reach out to your insurance provider with any questions.    If during the course of the call the physician/provider feels a telephone visit is not appropriate, you will not be charged for this service.\"    Patient has given verbal consent for Telephone visit?  Yes    How would you like to obtain your AVS? Cierra Pittman complains of   Chief Complaint   Patient presents with     Hospital F/U       ALLERGIES  Strawberry and Percocet [oxycodone-acetaminophen]        Hospital Follow-up Visit:    Hospital/Nursing Home/IP Rehab Facility: [facility]  Date of Admission: 3/26/2020  Date of Discharge: 3/27/2020  Reason(s) for Admission: Intractable back pain            Problems taking medications regularly:  None       Medication changes since discharge: None       Problems adhering to non-medication therapy:  None    Summary of hospitalization:  [facility] discharge summary reviewed  Diagnostic Tests/Treatments reviewed.  Follow up needed: none  Other Healthcare Providers Involved in Patient s Care:         None  Update since discharge: improved. Still doing some home exercises.  " Clearly much better than she was.  But not quite up to her usual level of function.      Still dealing with back soreness.  Difficult to stand up or sit down comfortably.  Still having pain with extending her back as well.  No pain shooting down her legs.  Some numbness in her feet, right worse than left.  No bowel/bladder changes, no saddle anesthesia.      Last weekend was really good, but a bit worse the last few days.    Has been walking regularly, getting outside.    Ibuprofen helps,  But has been bothering her stomach.      Post Discharge Medication Reconciliation: discharge medications reconciled, continue medications without change.  Plan of care communicated with patient     Coding guidelines for this visit:  Type of Medical   Decision Making Face-to-Face Visit       within 7 Days of discharge Face-to-Face Visit        within 14 days of discharge   Moderate Complexity [id number]   High Complexity 47408 [id number]                  Current Outpatient Medications   Medication Sig Dispense Refill     acetaminophen (TYLENOL) 325 MG tablet Take 2 tablets (650 mg) by mouth every 4 hours as needed for mild pain       aspirin-acetaminophen-caffeine (EXCEDRIN MIGRAINE) 250-250-65 MG tablet Take 1 tablet by mouth every 6 hours as needed for headaches       cyclobenzaprine (FLEXERIL) 10 MG tablet Take 1 tablet (10 mg) by mouth 3 times daily as needed for muscle spasms 30 tablet 0     HYDROcodone-acetaminophen (NORCO) 5-325 MG tablet Take 1-2 tablets by mouth every 4 hours as needed for moderate to severe pain 20 tablet 0     ibuprofen (ADVIL,MOTRIN) 800 MG tablet Take 1 tablet (800 mg) by mouth every 8 hours as needed for moderate pain 90 tablet 1     levothyroxine (SYNTHROID/LEVOTHROID) 100 MCG tablet TAKE 1 TABLET BY MOUTH  DAILY 90 tablet 3     meloxicam (MOBIC) 15 MG tablet Take 1 tablet (15 mg) by mouth daily as needed for moderate pain 30 tablet 1     SUMAtriptan (IMITREX) 50 MG tablet Take 1 tablet (50 mg) by mouth  at onset of headache for migraine 12 tablet 1     docusate sodium (COLACE) 100 MG capsule Take 1 capsule (100 mg) by mouth 2 times daily as needed for constipation       polyethylene glycol (MIRALAX) packet Take 17 g by mouth daily as needed for constipation         Allergies   Allergen Reactions     [place] Other (See Comments)     Sores in mouth     Percocet [Oxycodone-Acetaminophen] Other (See Comments)     Tongue swelling and hives     Recent Labs   Lab Test 03/27/20  0558 05/09/19  1148 02/28/18  1156  11/19/15  1528   LDL  --   --   --   --  71   HDL  --   --   --   --  48*   TRIG  --   --   --   --  178*   ALT  --   --   --   --  17   CR 0.95  --   --   --  0.75   GFRESTIMATED 72  --   --   --  85   GFRESTBLACK 84  --   --   --  >90  African American GFR Calc     POTASSIUM 4.3  --   --   --  4.5   TSH  --  0.83 1.02   < > 3.60    < > = values in this interval not displayed.      BP Readings from Last 3 Encounters:   03/27/20 115/62   01/17/20 100/78   05/09/19 110/74    Wt Readings from Last 3 Encounters:   03/26/20 102.1 kg (225 lb)   01/17/20 103.1 kg (227 lb 6.4 oz)   05/09/19 101.9 kg (224 lb 9.6 oz)                   Reviewed and updated as needed this visit by Provider         Review of Systems   ROS COMP: Constitutional, HEENT, cardiovascular, pulmonary, gi and gu systems are negative, except as otherwise noted.       Objective   Reported vitals:  [facility] 03/12/2020 (Approximate)      Psych: Alert and oriented times 3; coherent speech, normal   rate and volume, able to articulate logical thoughts, able   to abstract reason, no tangential thoughts, no hallucinations   or delusions  Her affect is normal     Diagnostic Test Results:  Labs reviewed in Epic        Assessment/Plan:    ICD-10-CM    1. Lumbar back pain with radiculopathy affecting right lower extremity  M54.16    2. Intractable back pain  M54.9 HYDROcodone-acetaminophen (NORCO) 5-325 MG tablet     meloxicam (MOBIC) 15 MG tablet      cyclobenzaprine (FLEXERIL) 10 MG tablet   3. Back muscle spasm  M62.830 cyclobenzaprine (FLEXERIL) 10 MG tablet      Overall, it sounds like patient is making relatively steady improvement, but with a bit of a setback this week.  She states she is using 2 Norco per day.  Will prescribe a small quantity to continue treating this, #20.  We will try changing to meloxicam from ibuprofen to see if this causes less GI upset.  Continue Flexeril for spasms.  Also discussed that she can use Tylenol along with the Norco, but she needs to be cautious that her total Tylenol dose does not exceed 3 to 4 g/day.  If she is not making continued improvement, will refer to physical therapy, but this is problematic during the COVID-19 social distancing guidelines.  Follow-up as needed.    Portions of this note were completed using Dragon dictation software.  Although reviewed, there may be typographical and other inadvertent errors that remain.     This phone visit was performed in an attempt to keep relatively healthy patients out of the clinic during the COVID-19 pandemic.       Return in about 3 months (around 7/9/2020) for Recheck.      Phone call duration:  10 minutes    Sonny Warner MD, MD

## 2020-04-09 NOTE — PATIENT INSTRUCTIONS
"Thank you for visiting [facility]    Keep trying to wean down off the hydrocodone.  As we discussed, each Norco tablet contains the equivalent of 1 tablet of Tylenol.    You can use acetaminophen to help with your pain, but try to stay under 8 to 10 tablets of acetaminophen and/or hydrocodone per day.    Since the ibuprofen is upsetting your stomach, let see if meloxicam is better tolerated.  This is taken once daily.  Do not take meloxicam and ibuprofen at the same time.    Continue your home exercises and regular walking.  If you are not having significant improvement over the next few days, let me know, and we will refer you to outpatient physical therapy.    Stay safe!    Contact us or return if questions or concerns.     Have a nice day!    Dr. Warner     No follow-ups on file.      If you had imaging scheduled please refer to your radiology prep sheet.      If you need medication refills, please contact your pharmacy 3 days before your prescriptions runs out or download the [facility] stefanie for your smart phone.   If you are out of refills, your pharmacy will contact contact the clinic.    Contact us or return if questions or concerns.     -[facility] Care Team:    MD Janet Conti PA-C Joel De Haan, PA-C Anna Niesen, PA-C Elizabeth \"Lee Ann\" CHARLIE Mott CNP, APRN, CHARLIE Pisano, ELIGIO Musa, RN, BSN       General information about your   [facility]      Clinic hours:     Lab hours:  Phone [phone]  Monday 7:30 am-7 pm    Monday 8:30 am-6:30 pm  Tuesday-Friday 7:30 am-5 pm   Tuesday-Friday 8:30 am-4:30 pm    Pharmacy hours:  Phone [phone]  Monday 8:30 am-7pm  Tuesday-Friday 8:30am-6 pm                                     [facility] assistance [phone]    We would like to hear from you, how was your visit today?    Shira Pompa  Patient Information " Supervisor   Patient Care Supervisor  [facility], and [facility], and [facility]

## 2020-04-23 ENCOUNTER — MYC MEDICAL ADVICE (OUTPATIENT)
Dept: FAMILY MEDICINE | Facility: OTHER | Age: 45
End: 2020-04-23

## 2020-04-24 NOTE — TELEPHONE ENCOUNTER
Reviewed hospital notes, no documentation of an IV infiltration, extravasation, or phlebitis episode while she was admitted.  No hx of previous blood clot.    Flagging for covering provider to provide recs.  Fanny Sapp, MADDIEN, RN, PHN

## 2020-04-30 DIAGNOSIS — E03.9 ACQUIRED HYPOTHYROIDISM: ICD-10-CM

## 2020-05-01 NOTE — TELEPHONE ENCOUNTER
Pending Prescriptions:                       Disp   Refills    levothyroxine (SYNTHROID/LEVOTHROID) 100 M*90 tab*3        Sig: TAKE 1 TABLET BY MOUTH  DAILY    Please call patient to schedule a virtual visit. (Video, Phone or E-visit) due for TSH labs  Also, ask if pt will need a refill before the visit, reflag for RN to give a abdoulaye or remove the medication.    Thank you!    Shree Musa, RN, BSN

## 2020-05-04 NOTE — TELEPHONE ENCOUNTER
Flagging for RN unable to close   Spoke with patient she states that she has a few months of meds left and would like to wait to do her labs  Thanks  Jemma Cleveland RT (R)

## 2020-05-06 RX ORDER — LEVOTHYROXINE SODIUM 100 UG/1
TABLET ORAL
Qty: 90 TABLET | Refills: 3 | OUTPATIENT
Start: 2020-05-06

## 2020-05-16 DIAGNOSIS — E03.9 ACQUIRED HYPOTHYROIDISM: ICD-10-CM

## 2020-05-18 RX ORDER — LEVOTHYROXINE SODIUM 100 UG/1
TABLET ORAL
Qty: 90 TABLET | Refills: 3 | OUTPATIENT
Start: 2020-05-18

## 2020-05-19 ENCOUNTER — TELEPHONE (OUTPATIENT)
Dept: FAMILY MEDICINE | Facility: OTHER | Age: 45
End: 2020-05-19

## 2020-05-19 DIAGNOSIS — E03.9 ACQUIRED HYPOTHYROIDISM: Primary | ICD-10-CM

## 2020-05-19 NOTE — TELEPHONE ENCOUNTER
Patient has enough medication until July, wondering if she can just do labs or does she need to be seen.

## 2020-05-21 NOTE — TELEPHONE ENCOUNTER
Left message for patient to return call to clinic. When call is returned please see messages below.     Gregory Oseguera,

## 2020-06-16 DIAGNOSIS — E03.9 ACQUIRED HYPOTHYROIDISM: ICD-10-CM

## 2020-06-16 LAB — TSH SERPL DL<=0.005 MIU/L-ACNC: 1.44 MU/L (ref 0.4–4)

## 2020-06-16 PROCEDURE — 36415 COLL VENOUS BLD VENIPUNCTURE: CPT | Performed by: FAMILY MEDICINE

## 2020-06-16 PROCEDURE — 84443 ASSAY THYROID STIM HORMONE: CPT | Performed by: FAMILY MEDICINE

## 2020-06-19 ENCOUNTER — MYC REFILL (OUTPATIENT)
Dept: FAMILY MEDICINE | Facility: OTHER | Age: 45
End: 2020-06-19

## 2020-06-19 DIAGNOSIS — E03.9 ACQUIRED HYPOTHYROIDISM: ICD-10-CM

## 2020-06-22 RX ORDER — LEVOTHYROXINE SODIUM 100 UG/1
100 TABLET ORAL DAILY
Qty: 90 TABLET | Refills: 2 | Status: SHIPPED | OUTPATIENT
Start: 2020-06-22 | End: 2021-01-27

## 2020-06-22 NOTE — TELEPHONE ENCOUNTER
Pending Prescriptions:                       Disp   Refills    levothyroxine (SYNTHROID/LEVOTHROID) 100 *90 tab*3            Sig: Take 1 tablet (100 mcg) by mouth daily    Prescription approved per [facility] Refill Protocol.    Farnaz Herr, MSN, RN

## 2020-10-13 NOTE — PROGRESS NOTES
Subjective     Amanda Pittman is a 44 year old female who presents to clinic today for the following health issues:    HPI         Back Pain  Onset/Duration: years  Description:   Location of pain: low back bilateral  Character of pain: sharp, dull ache, stabbing and constant  Pain radiation: none  New numbness or weakness in legs, not attributed to pain: no   Intensity: moderate  Progression of Symptoms: same  History:   Specific cause: bulging disc  Pain interferes with job: no  History of back problems: bulging disc L4-5  Any previous MRI or X-rays: Yes- at [facility].  Date 2020 and 2015  Sees a specialist for back pain: No  Alleviating factors:   Improved by: cold and heat    Precipitating factors:  Worsened by: Lifting, Bending, Standing, Sitting and Lying Flat  Therapies tried and outcome: acetaminophen (Tylenol), cold, heat and NSAIDs and slight relief    Her pain hasn't really improved from her last visit with me.  Meloxicam did help for a while.  She hasn't done PT yet.  She has done the home exercise regimen from the hospital.  Has been careful about how she changes position.    Has used heat packs, lidocaine patches, etc.  These are somewhat helpful.      Has a hard time getting up from certain positions.  No shooting pains either.  Just mostly spasms and very localized pain.  Feels like it's right by her tailbone.      Accompanying Signs & Symptoms:  Risk of Fracture: None  Risk of Cauda Equina: None  Risk of Infection: None  Risk of Cancer: None  Risk of Ankylosing Spondylitis: Onset at age <35, male, AND morning back stiffness  no           Review of Systems   Constitutional, HEENT, cardiovascular, pulmonary, gi and gu systems are negative, except as otherwise noted.        Objective    /82   Pulse 61   Temp 97.3  F (36.3  C) (Temporal)   Resp 14   Wt 102.7 kg (226 lb 6.4 oz)   SpO2 98%   BMI 36.54 kg/m    Body mass index is 36.54 kg/m .  Physical Exam   GENERAL: healthy, alert and no  "distress  NECK: no adenopathy, no asymmetry, masses, or scars and thyroid normal to palpation  RESP: lungs clear to auscultation - no rales, rhonchi or wheezes  CV: regular rate and rhythm, normal S1 S2, no S3 or S4, no murmur, click or rub, no peripheral edema and peripheral pulses strong  ABDOMEN: soft, nontender, no hepatosplenomegaly, no masses and bowel sounds normal  MS: no gross musculoskeletal defects noted, no edema  MS: tenderness over low sacrum near coccyx.  NEURO: Normal strength and tone, mentation intact and speech normal    Orders Only on 06/16/2020   Component Date Value Ref Range Status     TSH 06/16/2020 1.44  0.40 - 4.00 mU/L Final           Assessment & Plan       ICD-10-CM    1. Pain in the coccyx  M53.3 XR Sacrum and Coccyx 2 Views     PHYSICAL THERAPY REFERRAL   2. Intractable back pain  M54.9 meloxicam (MOBIC) 15 MG tablet     cyclobenzaprine (FLEXERIL) 10 MG tablet   3. Back muscle spasm  M62.830 cyclobenzaprine (FLEXERIL) 10 MG tablet   4. Need for prophylactic vaccination and inoculation against influenza  Z23       1, 2, 3.  This does not seem like typical low back pain.  Obtain x-ray today to further evaluate the sacrum and coccyx since her pain seems to be localized to this area.  Overall, this is reassuring.  We will resume meloxicam, since this was previously effective for her symptoms.  I did also recommend a trial of physical therapy and as needed use of Flexeril.  If not improving, we can consider MRI or referral to a spine specialist.  4.  Immunized.         BMI:   Estimated body mass index is 36.54 kg/m  as calculated from the following:    Height as of 3/26/20: 1.676 m (5' 6\").    Weight as of this encounter: 102.7 kg (226 lb 6.4 oz).   Weight management plan: Discussed healthy diet and exercise guidelines         Patient Instructions   Thank you for visiting [facility]    Resume meloxicam and flexeril to help with pain.      Let me know if not improving.  " "    Contact us or return if questions or concerns.     Have a nice day!    Dr. Warner     No follow-ups on file.      If you had imaging scheduled please refer to your radiology prep sheet.      If you need medication refills, please contact your pharmacy 3 days before your prescriptions runs out or download the [facility] stefanie for your smart phone.   If you are out of refills, your pharmacy will contact contact the clinic.    Contact us or return if questions or concerns.     -[facility] Care Team:    MD Janet Conti PA-C Joel De Haan, PA-C Anna Niesen, PA-C Elizabeth \"Lee Ann\" CHARLIE Mott CNP  Jaja Hill APRN, CNP  Meka York APRN, ELIGIO Musa, MADDIEN, RN, PHN                                         [facility] [phone]    We would like to hear from you, how was your visit today?    Shira Sepulveda       Patient Information Supervisor     [place], [place], and [facility]                 Return in about 4 weeks (around 11/11/2020) for Recheck.    Sonny Warner MD, MD  [facility] [place]    "

## 2020-10-14 ENCOUNTER — ANCILLARY PROCEDURE (OUTPATIENT)
Dept: GENERAL RADIOLOGY | Facility: OTHER | Age: 45
End: 2020-10-14
Attending: FAMILY MEDICINE
Payer: COMMERCIAL

## 2020-10-14 ENCOUNTER — OFFICE VISIT (OUTPATIENT)
Dept: FAMILY MEDICINE | Facility: OTHER | Age: 45
End: 2020-10-14
Payer: COMMERCIAL

## 2020-10-14 VITALS
WEIGHT: 226.4 LBS | RESPIRATION RATE: 14 BRPM | OXYGEN SATURATION: 98 % | BODY MASS INDEX: 36.54 KG/M2 | HEART RATE: 61 BPM | TEMPERATURE: 97.3 F | DIASTOLIC BLOOD PRESSURE: 82 MMHG | SYSTOLIC BLOOD PRESSURE: 126 MMHG

## 2020-10-14 DIAGNOSIS — M53.3 PAIN IN THE COCCYX: ICD-10-CM

## 2020-10-14 DIAGNOSIS — Z23 NEED FOR PROPHYLACTIC VACCINATION AND INOCULATION AGAINST INFLUENZA: ICD-10-CM

## 2020-10-14 DIAGNOSIS — M54.9 INTRACTABLE BACK PAIN: ICD-10-CM

## 2020-10-14 DIAGNOSIS — M53.3 PAIN IN THE COCCYX: Primary | ICD-10-CM

## 2020-10-14 DIAGNOSIS — M62.830 BACK MUSCLE SPASM: ICD-10-CM

## 2020-10-14 PROCEDURE — 99214 OFFICE O/P EST MOD 30 MIN: CPT | Performed by: FAMILY MEDICINE

## 2020-10-14 PROCEDURE — 72220 X-RAY EXAM SACRUM TAILBONE: CPT | Performed by: RADIOLOGY

## 2020-10-14 RX ORDER — CYCLOBENZAPRINE HCL 10 MG
10 TABLET ORAL 3 TIMES DAILY PRN
Qty: 90 TABLET | Refills: 1 | Status: SHIPPED | OUTPATIENT
Start: 2020-10-14

## 2020-10-14 RX ORDER — MELOXICAM 15 MG/1
15 TABLET ORAL DAILY PRN
Qty: 90 TABLET | Refills: 0 | Status: SHIPPED | OUTPATIENT
Start: 2020-10-14 | End: 2021-02-17

## 2020-10-14 NOTE — PATIENT INSTRUCTIONS
"Thank you for visiting [facility]    Resume meloxicam and flexeril to help with pain.      Let me know if not improving.      Contact us or return if questions or concerns.     Have a nice day!    Dr. Warner     No follow-ups on file.      If you had imaging scheduled please refer to your radiology prep sheet.      If you need medication refills, please contact your pharmacy 3 days before your prescriptions runs out or download the [facility] stefanie for your smart phone.   If you are out of refills, your pharmacy will contact contact the clinic.    Contact us or return if questions or concerns.     -[facility] Care Team:    MD Janet Conti PA-C Joel De Haan, PA-C Anna Niesen, PA-C Elizabeth \"Lee Ann\" CHARLIE Mott CNP, APRN, CNP  Meka York APRN, CNP  Shree Musa, MADDIEN, RN, PHN                                         [facility] assistance [phone]    We would like to hear from you, how was your visit today?    Shira Sepulveda       Patient Information Supervisor     [place], [place], and [facility]             "

## 2020-10-15 NOTE — RESULT ENCOUNTER NOTE
Amanda,    There were no convincing abnormalities seen on your X-ray per the radiologist.  If you aren't responding to the treatments we discussed, we can certainly obtain a CT or MRI of your sacrum.  I'd also be willing to refer to a specialist if desired.    Have a nice day!    Dr. Warner

## 2020-10-22 ENCOUNTER — HOSPITAL ENCOUNTER (OUTPATIENT)
Dept: MAMMOGRAPHY | Facility: CLINIC | Age: 45
Discharge: HOME | End: 2020-10-22
Attending: FAMILY MEDICINE | Admitting: FAMILY MEDICINE
Payer: COMMERCIAL

## 2020-10-22 DIAGNOSIS — Z12.31 VISIT FOR SCREENING MAMMOGRAM: ICD-10-CM

## 2020-10-22 PROCEDURE — 77063 BREAST TOMOSYNTHESIS BI: CPT

## 2020-10-28 ENCOUNTER — HOSPITAL ENCOUNTER (OUTPATIENT)
Dept: PHYSICAL THERAPY | Facility: CLINIC | Age: 45
Setting detail: THERAPIES SERIES
End: 2020-10-28
Attending: FAMILY MEDICINE
Payer: COMMERCIAL

## 2020-10-28 DIAGNOSIS — M53.3 PAIN IN THE COCCYX: ICD-10-CM

## 2020-10-28 PROCEDURE — 97110 THERAPEUTIC EXERCISES: CPT | Mod: GP | Performed by: PHYSICAL THERAPIST

## 2020-10-28 PROCEDURE — 97161 PT EVAL LOW COMPLEX 20 MIN: CPT | Mod: GP | Performed by: PHYSICAL THERAPIST

## 2020-10-29 NOTE — PROGRESS NOTES
"   10/28/20 1648   General Information   Type of Visit Initial OP Ortho PT Evaluation   Start of Care Date 10/28/20   Referring Physician Sonny Warner MD   Patient/Family Goals Statement decrease pain with ADL and work activities   Orders Evaluate and Treat   Date of Order 10/14/20   Certification Required? No   Medical Diagnosis Pain in coccyx (M53.3)   Surgical/Medical history reviewed Yes   Precautions/Limitations no known precautions/limitations   Weight-Bearing Status - LUE full weight-bearing   Weight-Bearing Status - RUE full weight-bearing   Weight-Bearing Status - LLE full weight-bearing   Weight-Bearing Status - RLE full weight-bearing   Body Part(s)   Body Part(s) Lumbar Spine/SI   Presentation and Etiology   Pertinent history of current problem (include personal factors and/or comorbidities that impact the POC) Patient reports chronic/recurrent back (coccyx) pain starting 8 years ago when she was bucked from a horse. Imaging at that time showed \"inconclusive\" regarding any coccygeal fractures. MD stated nothing you can do if it is broken. 5 years ago was in ED with bulging disc L4-5. March 2020 also in ED with disc bulge the same location and similar pain. It has been sore ever since that episode. 10/14/20 xrays coccyx showing \"inconclusive\" again, image was blurry. Has never done PT before for back pain. In March was prescribed pain meds and muscle relaxor which helped and due to COVID restrictions limited continued care and did not attend PT. Could not  the ED and was monitored overnight, gave tips/tricks for how to get out of bed and extension exercises which she still continues. Just started meloxipam the last 10 days since seeing Dr. Warner and it does help some. Had tried \"made up\" stretches, ice, bengay, lidocaine patch all without relief.  Sitting tolerance at work is 90 min. Can find comfort in a sidelying position, and prone is able in the past as well.  Pain with lifting due to bending " not once carrying. Does extension exercises feels good but doesn't change the pain.  Urge incontinence worsening since March. Had emergency csection 17 years ago and a scheduled csection 5 years ago she did start early labor before that csection.    Impairments A. Pain;B. Decreased WB tolerance;D. Decreased ROM;H. Impaired gait;G. Impaired balance;K. Numbness;P. Bowel or bladder problems  (having more difficulty with urine urgency since March)   Functional Limitations perform activities of daily living;perform required work activities   Symptom Location Coccyx and in a line across gluteals bilaterally   How/Where did it occur During recreation/sports   Onset date of current episode/exacerbation 10/14/20   Chronicity Recurrent   Pain rating (0-10 point scale) Best (/10);Worst (/10);Other   Best (/10) 2-3/10   Worst (/10) 6/10   Pain rating comment Average 3/10   Pain quality B. Dull;H. Other   Pain quality comment numbness across way down at coccyx and across gluteals , not down legs or into feet   Frequency of pain/symptoms A. Constant   Pain/symptoms are: The same all the time   Pain/symptoms exacerbated by A. Sitting;M. Other   Pain exacerbation comment firm surface, squishy couch, giving daughter a bath in kneeling/4 point, bending to get something out of cupboard   Pain/symptoms eased by K. Other   Pain eased by comment lumbar roll does help   Progression of symptoms since onset: Unchanged   Current / Previous Interventions   Diagnostic Tests: X-ray   X-ray Results Results   X-ray results Right side of the inferior sacrum is somewhat difficult to see on these images. A lytic sacral lesion in that location is difficult to exclude, but is not considered likely   Prior Level of Function   Prior Level of Function-Mobility IND   Prior Level of Function-ADLs IND   Functional Level Prior Comment active mother of a 5 year old   Current Level of Function   Current Community Support Family/friend caregiver   Patient  role/employment history A. 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 Yes   Have you fallen and had an injury in the past year? No   Is patient a fall risk? No   Fall screen comments Did fall in kitchen tripped, and tripped on uneven ground both back in winter/early spring.   Functional Scales   Functional Scales Other   Other Scales  LEFS   Lumbar Spine/SI Objective Findings   Integumentary no concerns   Posture Left weight shift in chair. Standing increased lumbar lordosis.  PSIS neutral in standing,   Gait/Locomotion anterior pelvic tilt   Balance/Proprioception (Single Leg Stance) did not test at eval, patient does report difficulties with balance   Flexion %   Extension %   Right Side Bending %   Left Side Bending % but with right SIJ pain   Repeated Extension-Standing ROM 2/10 right SIJ to start,after 2x10 reps feels 2/10 slightly larger right SIJ, and deep cocccyx area pain   Repeated Flexion-Standing ROM 2/10 in sitting coccyx central, after 10 reps reports R SIJ 2/10   Lumbar ROM Comment Side glide left is painful in R SIJ, left side glide no change   Pelvic Screen (+) forward bend for L SIJ. (-) Stork BLE. Supine left ASIS slightly low (right high). Leg length neutral BLE. IT bones symmetrical. Prone sacrum appears shifted right and rotated to the right with Right NANO tilted. Presents as a Right rotation on a right  axis (R on R forward sacral torsion/rotation)   Hip Screen further testing to be continued   Transversus Abdominus Strength (Jesse Leg Lowering-deg) needs cues for no breath holding but able to activate TA, weakness noted with bed mobility   Hip Flexion (L2) Strength 5/5 BLE   Hip Abduction Strength 4+/5 B   Hip Adduction Strength 5/5 BLE   Hip Extension Strength 5/5  BLE   Knee Flexion Strength 5/5 BLE   Knee Extension (L3) Strength 5/5 BLE   Ankle Dorsiflexion (L4) Strength 5/5 BLE   Great Toe Extension (L5) Strength 5/5 BLE   Ankle Plantar Flexion (S1) Strength 5/5 BLE   Lumbar/Hip/Knee/Foot Strength Comments Patient able to complete a pelvic floor kegel, cues for no breath holding   Slump Test negative BLE   Segmental Mobility Hypomobility at L4 PA glide. pain with sacral PA glide, no pain with left SIJ shear, pain with right SIJ shear   Palpation R SIJ tender to palpation, L4-5 tender, coccyx tender, tender R gluteals at insertion to sacrum, nontender left   Planned Therapy Interventions   Planned Therapy Interventions balance training;gait training;joint mobilization;manual therapy;motor coordination training;neuromuscular re-education;ROM;strengthening;stretching   Planned Modality Interventions   Planned Modality Interventions Comments modalities PRN   Clinical Impression   Criteria for Skilled Therapeutic Interventions Met yes, treatment indicated   PT Diagnosis sacral pain with evidence for sacroiliac joint dysfunction, focal coccyx pain, and L4-5 dysfunction   Influenced by the following impairments traumatic injury, inconclusive radiographic findings regarding coccyx involvement, L4-5 disc herniation, pain, posture,    Functional limitations due to impairments Difficulty sitting, 4 point, kneeling, standing, bending, completing ADL and work duties   Clinical Presentation Stable/Uncomplicated   Clinical Presentation Rationale 43yo with recurrent back pain, has improved in the past but chronic since March 2020, traumatic injury 8 years ago, low number of comorbidities   Clinical Decision Making (Complexity) Low complexity   Therapy Frequency 1 time/week   Predicted Duration of Therapy Intervention (days/wks) 6-10 weeks   Risk & Benefits of therapy have been explained Yes   Patient, Family & other staff in agreement with plan of care Yes   Clinical Impression Comments  43yo female with traumatic coccyx injury 8 years ago with intermittent coccyx pain. Findings of right SIJ dysfunction and what seems to be a right rotation of the sacrum (upward tilt of R NNAO with a positive stork on the left). Concurrent history of disc bulge at L4-5 with a flare up in March 2020 and along with this came some urge incontinence around the same timeline. Patient would benefit from skilled PT services to address the above pelvic and lumbar pain, impairments, and improve function.   Education Assessment   Preferred Learning Style Listening;Demonstration;Pictures/video   Barriers to Learning No barriers   ORTHO GOALS   PT Ortho Eval Goals 1;2;3;4   Ortho Goal 1   Goal Identifier Sitting   Goal Description Patient will be able to sit for 2 hours with 2/10 pain or less in order to facilitate traveling and work duties.    Target Date 12/09/20   Ortho Goal 2   Goal Identifier Kneeling   Goal Description Patient will demonstrate ability to complete tall kneeling and quadruped positions pain free in order to give her daughter a bath.    Target Date 12/23/20   Ortho Goal 3   Goal Identifier Pain   Goal Description Patient will report a reduction in pain by 50% in order to improve participation in ADL and work duties.    Target Date 01/06/21   Ortho Goal 4   Goal Identifier HEP   Goal Description Patient will be independent with a comprehensive HEP addressing pain management, posture, ROM, and strength in order to facilitate discharge from PT services.    Target Date 01/06/21   Total Evaluation Time   PT Moe, Low Complexity Minutes (87838) 50     Thank you for your referral!    Concha Bruner PT, DPT  [facility]  [phone]

## 2020-11-25 ENCOUNTER — HOSPITAL ENCOUNTER (OUTPATIENT)
Dept: PHYSICAL THERAPY | Facility: CLINIC | Age: 45
Setting detail: THERAPIES SERIES
End: 2020-11-25
Attending: FAMILY MEDICINE
Payer: COMMERCIAL

## 2020-11-25 PROCEDURE — 97530 THERAPEUTIC ACTIVITIES: CPT | Mod: GP | Performed by: PHYSICAL THERAPIST

## 2020-11-25 PROCEDURE — 97110 THERAPEUTIC EXERCISES: CPT | Mod: GP | Performed by: PHYSICAL THERAPIST

## 2020-12-02 ENCOUNTER — HOSPITAL ENCOUNTER (OUTPATIENT)
Dept: PHYSICAL THERAPY | Facility: CLINIC | Age: 45
Setting detail: THERAPIES SERIES
End: 2020-12-02
Attending: FAMILY MEDICINE
Payer: COMMERCIAL

## 2020-12-02 PROCEDURE — 97110 THERAPEUTIC EXERCISES: CPT | Mod: GP | Performed by: PHYSICAL THERAPIST

## 2020-12-02 PROCEDURE — 97014 ELECTRIC STIMULATION THERAPY: CPT | Mod: GP | Performed by: PHYSICAL THERAPIST

## 2020-12-11 ENCOUNTER — HOSPITAL ENCOUNTER (OUTPATIENT)
Dept: PHYSICAL THERAPY | Facility: CLINIC | Age: 45
Setting detail: THERAPIES SERIES
End: 2020-12-11
Attending: FAMILY MEDICINE
Payer: COMMERCIAL

## 2020-12-11 PROCEDURE — 97110 THERAPEUTIC EXERCISES: CPT | Mod: GP | Performed by: PHYSICAL THERAPIST

## 2020-12-11 PROCEDURE — 97530 THERAPEUTIC ACTIVITIES: CPT | Mod: GP | Performed by: PHYSICAL THERAPIST

## 2020-12-11 PROCEDURE — 97014 ELECTRIC STIMULATION THERAPY: CPT | Mod: GP | Performed by: PHYSICAL THERAPIST

## 2020-12-17 ENCOUNTER — HOSPITAL ENCOUNTER (OUTPATIENT)
Dept: PHYSICAL THERAPY | Facility: CLINIC | Age: 45
Setting detail: THERAPIES SERIES
End: 2020-12-17
Attending: FAMILY MEDICINE
Payer: COMMERCIAL

## 2020-12-17 PROCEDURE — 97140 MANUAL THERAPY 1/> REGIONS: CPT | Mod: GP | Performed by: PHYSICAL THERAPIST

## 2020-12-23 ENCOUNTER — HOSPITAL ENCOUNTER (OUTPATIENT)
Dept: PHYSICAL THERAPY | Facility: CLINIC | Age: 45
Setting detail: THERAPIES SERIES
End: 2020-12-23
Attending: FAMILY MEDICINE
Payer: COMMERCIAL

## 2020-12-23 PROCEDURE — 97110 THERAPEUTIC EXERCISES: CPT | Mod: GP | Performed by: PHYSICAL THERAPIST

## 2020-12-23 PROCEDURE — 97140 MANUAL THERAPY 1/> REGIONS: CPT | Mod: GP | Performed by: PHYSICAL THERAPIST

## 2020-12-29 ENCOUNTER — HOSPITAL ENCOUNTER (OUTPATIENT)
Dept: PHYSICAL THERAPY | Facility: CLINIC | Age: 45
Setting detail: THERAPIES SERIES
End: 2020-12-29
Attending: FAMILY MEDICINE
Payer: COMMERCIAL

## 2020-12-29 PROCEDURE — 97110 THERAPEUTIC EXERCISES: CPT | Mod: GP | Performed by: PHYSICAL THERAPIST

## 2020-12-29 NOTE — PROGRESS NOTES
Outpatient Physical Therapy Discharge Note     Patient: Amanda Pittman  : 1975    Beginning/End Dates of Reporting Period:  10/28/20 to 2020    Referring Provider:     Therapy Diagnosis: LBP, UI, referral pain     Client Self Report: Been very happy with her progress and has been doing the PREP every 2 hours.     Objective Measurements:  Objective Measure: Alignment  Details: WNL    Objective Measure: LBP  Details: #1 lime towards the right SI    Objective Measure: right NANO region  Details: #0    Objective Measure: urgency  Details: none        Goals:  Goal Identifier Sitting   Goal Description Patient will be able to sit for 2 hours with 2/10 pain or less in order to facilitate traveling and work duties.    Target Date 20   Date Met  20   Progress:     Goal Identifier Kneeling   Goal Description Patient will demonstrate ability to complete tall kneeling and quadruped positions pain free in order to give her daughter a bath. (daughter taking showers)   Target Date     Date Met      Progress:     Goal Identifier Pain   Goal Description Patient will report a reduction in pain by 50% in order to improve participation in ADL and work duties.    Target Date 21   Date Met  20   Progress:     Goal Identifier HEP   Goal Description Patient will be independent with a comprehensive HEP addressing pain management, posture, ROM, and strength in order to facilitate discharge from PT services.    Target Date 21   Date Met   20   Progress:       Progress Toward Goals:   Progress this reporting period: Patient is not pain free, but is progressing well and can cont on her own with the HEP.           Plan:  Discharge from therapy.    Discharge:    Reason for Discharge: Patient chooses to discontinue therapy.        Discharge Plan: Patient to continue home program.    Thank you for the referral,            Monica Gallegos PT

## 2021-01-09 ENCOUNTER — HEALTH MAINTENANCE LETTER (OUTPATIENT)
Age: 46
End: 2021-01-09

## 2021-01-25 DIAGNOSIS — E03.9 ACQUIRED HYPOTHYROIDISM: ICD-10-CM

## 2021-01-27 RX ORDER — LEVOTHYROXINE SODIUM 100 UG/1
TABLET ORAL
Qty: 90 TABLET | Refills: 3 | Status: SHIPPED | OUTPATIENT
Start: 2021-01-27 | End: 2021-12-24

## 2021-01-27 NOTE — TELEPHONE ENCOUNTER
Prescription approved per [facility] Refill Protocol.  Katelyn Tafoya RN, BSN  [place]  January 27, 2021

## 2021-02-15 DIAGNOSIS — Z51.81 ENCOUNTER FOR THERAPEUTIC DRUG MONITORING: Primary | ICD-10-CM

## 2021-02-15 DIAGNOSIS — M54.9 INTRACTABLE BACK PAIN: ICD-10-CM

## 2021-02-16 NOTE — TELEPHONE ENCOUNTER
Pending Prescriptions:                       Disp   Refills    meloxicam (MOBIC) 15 MG tablet [Pharmacy M*90 tab*0        Sig: Take 1 tablet (15 mg) by mouth daily as needed for           moderate pain    Routing refill request to provider for review/approval because:  Labs not current:  Normal ALT on file in past 12 months        Recent Labs   Lab Test 11/19/15  1528   ALT 17        Normal AST on file in past 12 months        Recent Labs   Lab Test 11/19/15  1528   AST 22

## 2021-02-17 RX ORDER — MELOXICAM 15 MG/1
15 TABLET ORAL DAILY PRN
Qty: 90 TABLET | Refills: 0 | Status: SHIPPED | OUTPATIENT
Start: 2021-02-17 | End: 2021-06-03

## 2021-05-11 ENCOUNTER — IMMUNIZATION (OUTPATIENT)
Dept: PEDIATRICS | Facility: CLINIC | Age: 46
End: 2021-05-11
Payer: COMMERCIAL

## 2021-05-11 PROCEDURE — 0001A PR COVID VAC PFIZER DIL RECON 30 MCG/0.3 ML IM: CPT

## 2021-05-11 PROCEDURE — 91300 PR COVID VAC PFIZER DIL RECON 30 MCG/0.3 ML IM: CPT

## 2021-06-01 ENCOUNTER — IMMUNIZATION (OUTPATIENT)
Dept: PEDIATRICS | Facility: CLINIC | Age: 46
End: 2021-06-01
Attending: INTERNAL MEDICINE
Payer: COMMERCIAL

## 2021-06-01 DIAGNOSIS — M54.9 INTRACTABLE BACK PAIN: ICD-10-CM

## 2021-06-01 PROCEDURE — 0002A PR COVID VAC PFIZER DIL RECON 30 MCG/0.3 ML IM: CPT

## 2021-06-01 PROCEDURE — 91300 PR COVID VAC PFIZER DIL RECON 30 MCG/0.3 ML IM: CPT

## 2021-06-02 NOTE — TELEPHONE ENCOUNTER
Pending Prescriptions:                       Disp   Refills    meloxicam (MOBIC) 15 MG tablet [Pharmacy M*90 tab*0        Sig: TAKE ONE TABLET BY MOUTH ONCE DAILY AS NEEDED FOR           MODERATE PAIN      Routing refill request to provider for review/approval because:  Rosa Maria given x1 and patient did not follow up, please advise  Labs not current:  Alt, ast, cbc, creatinine    Jemma Becerril RN on 6/2/2021 at 5:05 PM

## 2021-06-03 RX ORDER — MELOXICAM 15 MG/1
TABLET ORAL
Qty: 90 TABLET | Refills: 0 | Status: SHIPPED | OUTPATIENT
Start: 2021-06-03 | End: 2021-09-17

## 2021-06-04 ENCOUNTER — MYC MEDICAL ADVICE (OUTPATIENT)
Dept: FAMILY MEDICINE | Facility: OTHER | Age: 46
End: 2021-06-04

## 2021-06-04 NOTE — TELEPHONE ENCOUNTER
Your medication has been refilled.  Please schedule a visit to follow up on how this medication is working for you before your next refill.  Lab monitoring is also due.  This has been ordered.  Can be done prior to the visit if desired.  We need to be sure everything is working well and stable prior to further refills.

## 2021-09-17 ENCOUNTER — OFFICE VISIT (OUTPATIENT)
Dept: FAMILY MEDICINE | Facility: OTHER | Age: 46
End: 2021-09-17
Payer: COMMERCIAL

## 2021-09-17 VITALS
RESPIRATION RATE: 16 BRPM | OXYGEN SATURATION: 100 % | BODY MASS INDEX: 35.77 KG/M2 | TEMPERATURE: 98.9 F | DIASTOLIC BLOOD PRESSURE: 80 MMHG | HEART RATE: 64 BPM | SYSTOLIC BLOOD PRESSURE: 120 MMHG | WEIGHT: 221.6 LBS

## 2021-09-17 DIAGNOSIS — M19.041 LOCALIZED OSTEOARTHRITIS OF HAND, RIGHT: ICD-10-CM

## 2021-09-17 DIAGNOSIS — E03.9 ACQUIRED HYPOTHYROIDISM: ICD-10-CM

## 2021-09-17 DIAGNOSIS — Z13.6 CARDIOVASCULAR SCREENING; LDL GOAL LESS THAN 160: ICD-10-CM

## 2021-09-17 DIAGNOSIS — M54.9 INTRACTABLE BACK PAIN: Primary | ICD-10-CM

## 2021-09-17 DIAGNOSIS — Z51.81 ENCOUNTER FOR THERAPEUTIC DRUG MONITORING: ICD-10-CM

## 2021-09-17 DIAGNOSIS — Z13.220 SCREENING FOR HYPERLIPIDEMIA: ICD-10-CM

## 2021-09-17 DIAGNOSIS — G43.119 INTRACTABLE MIGRAINE WITH AURA WITHOUT STATUS MIGRAINOSUS: ICD-10-CM

## 2021-09-17 DIAGNOSIS — Z11.59 NEED FOR HEPATITIS C SCREENING TEST: ICD-10-CM

## 2021-09-17 DIAGNOSIS — N95.1 PERIMENOPAUSE: ICD-10-CM

## 2021-09-17 LAB
ANION GAP SERPL CALCULATED.3IONS-SCNC: 3 MMOL/L (ref 3–14)
BUN SERPL-MCNC: 13 MG/DL (ref 7–30)
CALCIUM SERPL-MCNC: 8.9 MG/DL (ref 8.5–10.1)
CHLORIDE BLD-SCNC: 109 MMOL/L (ref 94–109)
CHOLEST SERPL-MCNC: 148 MG/DL
CO2 SERPL-SCNC: 27 MMOL/L (ref 20–32)
CREAT SERPL-MCNC: 0.95 MG/DL (ref 0.52–1.04)
ERYTHROCYTE [DISTWIDTH] IN BLOOD BY AUTOMATED COUNT: 12.9 % (ref 10–15)
ESTRADIOL SERPL-MCNC: 152 PG/ML
FASTING STATUS PATIENT QL REPORTED: NO
FSH SERPL-ACNC: 3.2 IU/L
GFR SERPL CREATININE-BSD FRML MDRD: 72 ML/MIN/1.73M2
GLUCOSE BLD-MCNC: 108 MG/DL (ref 70–99)
HCT VFR BLD AUTO: 42 % (ref 35–47)
HDLC SERPL-MCNC: 58 MG/DL
HGB BLD-MCNC: 14.6 G/DL (ref 11.7–15.7)
LDLC SERPL CALC-MCNC: 77 MG/DL
LH SERPL-ACNC: 4.9 IU/L
MCH RBC QN AUTO: 31.7 PG (ref 26.5–33)
MCHC RBC AUTO-ENTMCNC: 34.8 G/DL (ref 31.5–36.5)
MCV RBC AUTO: 91 FL (ref 78–100)
NONHDLC SERPL-MCNC: 90 MG/DL
PLATELET # BLD AUTO: 311 10E3/UL (ref 150–450)
POTASSIUM BLD-SCNC: 4 MMOL/L (ref 3.4–5.3)
RBC # BLD AUTO: 4.6 10E6/UL (ref 3.8–5.2)
SODIUM SERPL-SCNC: 139 MMOL/L (ref 133–144)
TRIGL SERPL-MCNC: 64 MG/DL
TSH SERPL DL<=0.005 MIU/L-ACNC: 1.43 MU/L (ref 0.4–4)
WBC # BLD AUTO: 14.5 10E3/UL (ref 4–11)

## 2021-09-17 PROCEDURE — 83002 ASSAY OF GONADOTROPIN (LH): CPT | Performed by: FAMILY MEDICINE

## 2021-09-17 PROCEDURE — 83001 ASSAY OF GONADOTROPIN (FSH): CPT | Performed by: FAMILY MEDICINE

## 2021-09-17 PROCEDURE — 82670 ASSAY OF TOTAL ESTRADIOL: CPT | Performed by: FAMILY MEDICINE

## 2021-09-17 PROCEDURE — 84443 ASSAY THYROID STIM HORMONE: CPT | Performed by: FAMILY MEDICINE

## 2021-09-17 PROCEDURE — 90686 IIV4 VACC NO PRSV 0.5 ML IM: CPT | Performed by: FAMILY MEDICINE

## 2021-09-17 PROCEDURE — 80061 LIPID PANEL: CPT | Performed by: FAMILY MEDICINE

## 2021-09-17 PROCEDURE — 86803 HEPATITIS C AB TEST: CPT | Performed by: FAMILY MEDICINE

## 2021-09-17 PROCEDURE — 36415 COLL VENOUS BLD VENIPUNCTURE: CPT | Performed by: FAMILY MEDICINE

## 2021-09-17 PROCEDURE — 85027 COMPLETE CBC AUTOMATED: CPT | Performed by: FAMILY MEDICINE

## 2021-09-17 PROCEDURE — 90471 IMMUNIZATION ADMIN: CPT | Performed by: FAMILY MEDICINE

## 2021-09-17 PROCEDURE — 99214 OFFICE O/P EST MOD 30 MIN: CPT | Mod: 25 | Performed by: FAMILY MEDICINE

## 2021-09-17 PROCEDURE — 80048 BASIC METABOLIC PNL TOTAL CA: CPT | Performed by: FAMILY MEDICINE

## 2021-09-17 RX ORDER — MELOXICAM 15 MG/1
TABLET ORAL
Qty: 90 TABLET | Refills: 1 | Status: SHIPPED | OUTPATIENT
Start: 2021-09-17 | End: 2022-03-15

## 2021-09-17 RX ORDER — SUMATRIPTAN 50 MG/1
50 TABLET, FILM COATED ORAL
Qty: 12 TABLET | Refills: 1 | Status: SHIPPED | OUTPATIENT
Start: 2021-09-17 | End: 2023-05-31

## 2021-09-17 NOTE — PROGRESS NOTES
Assessment & Plan       ICD-10-CM    1. Intractable back pain  M54.9 meloxicam (MOBIC) 15 MG tablet     CBC with platelets     Basic metabolic panel  (Ca, Cl, CO2, Creat, Gluc, K, Na, BUN)     Basic metabolic panel  (Ca, Cl, CO2, Creat, Gluc, K, Na, BUN)     CBC with platelets     amitriptyline (ELAVIL) 25 MG tablet   2. Intractable migraine with aura without status migrainosus  G43.119 CBC with platelets     Basic metabolic panel  (Ca, Cl, CO2, Creat, Gluc, K, Na, BUN)     SUMAtriptan (IMITREX) 50 MG tablet     Basic metabolic panel  (Ca, Cl, CO2, Creat, Gluc, K, Na, BUN)     CBC with platelets     amitriptyline (ELAVIL) 25 MG tablet   3. Perimenopause  N95.1 CBC with platelets     Basic metabolic panel  (Ca, Cl, CO2, Creat, Gluc, K, Na, BUN)     Follicle stimulating hormone     Lutropin     Estradiol     Estradiol     Lutropin     Follicle stimulating hormone     Basic metabolic panel  (Ca, Cl, CO2, Creat, Gluc, K, Na, BUN)     CBC with platelets   4. Localized osteoarthritis of hand, right  M19.041    5. Acquired hypothyroidism  E03.9 TSH WITH FREE T4 REFLEX     TSH WITH FREE T4 REFLEX   6. Encounter for therapeutic drug monitoring  Z51.81 CBC with platelets     Basic metabolic panel  (Ca, Cl, CO2, Creat, Gluc, K, Na, BUN)     Basic metabolic panel  (Ca, Cl, CO2, Creat, Gluc, K, Na, BUN)     CBC with platelets   7. Screening for hyperlipidemia  Z13.220    8. CARDIOVASCULAR SCREENING; LDL GOAL LESS THAN 160  Z13.6 Lipid panel reflex to direct LDL Non-fasting     Lipid panel reflex to direct LDL Non-fasting   9. Need for hepatitis C screening test  Z11.59 Hepatitis C Screen Reflex to HCV RNA Quant and Genotype     Hepatitis C Screen Reflex to HCV RNA Quant and Genotype      1.  Ongoing issue.  Imaging does not show severe compromise that would be highly amenable to surgery.  Physical therapy has been less than fully effective.  She does get some relief from NSAIDs.  We will continue meloxicam at this time.  In  discussing some of her other symptoms, she was interested in trying amitriptyline to see if this would also help with her back pain.  If not helpful, we could consider duloxetine or other similar agent.  2.  Getting worse.  This may be related to perimenopause.  Patient requested that we evaluate her hormones to see if this might be amenable to treatment.  This was reasonable.  We will also try Imitrex for abortive therapy of her headaches and amitriptyline for preventative therapy.  Follow-up if not improving.  3.  Symptoms not suspicious for this.  We will check some hormone levels.  Consider hormone therapy if patient wishes to try that approach to better control her menstrual migraines and other perimenopausal symptoms.  4.  Clinically suspicious for this.  Very little pain.  Discussed over-the-counter management options.  Discussed that we can proceed with imaging if she would like, but it would not  at this time.  Follow-up if worsening or not improving.  5.  Clinically controlled.  Will continue current regimen and check monitoring labs.  6.  Monitoring labs ordered.  7, 8.  Screening ordered.  9.  Screening ordered.    Portions of this note were completed using Dragon dictation software.  Although reviewed, there may be typographical and other inadvertent errors that remain.       Review of the result(s) of each unique test - MRI back x-ray back  Ordering of each unique test  Prescription drug management  34 minutes spent on the date of the encounter doing chart review, history and exam, documentation and further activities per the note       Patient Instructions   Thank you for visiting [facility]    Can try glucosamine (1500 mg/day) for any arthritis component of the knuckle pain.  Should take for four weeks before deciding it's not helping.    Tylenol/Mobic can help too.      If you decide you'd like an X-ray, I can order one.    Let's try amitriptyline to help with  "headache prevention and back pain.      If your back continues to worsen despite this, we can consider repeat imaging or one of the other medications we discussed (duloxetine, gabapentin, lyrica).      For migraines, we can consider other options too if needed.      We'll let you know your lab results as soon as we can.    Contact us or return if questions or concerns.     Have a nice day!    Dr. Warner     Return in about 4 weeks (around 10/15/2021) for E-visit, video, or phone visit, Recheck.      If you need medication refills, please contact your pharmacy 3 days before your prescriptions runs out or download the [facility] stefanie for your smart phone. If you are out of refills, your pharmacy will contact contact the clinic.                                     [facility] [phone]                       Return in about 4 weeks (around 10/15/2021) for E-visit, video, or phone visit, Recheck.    Sonny Warner MD, MD  [facility] LEONARD Patel is a 45 year old who presents for the following health issues     History of Present Illness       Back Pain:  She presents for follow up of back pain. Patient's back pain is a recurring problem.  Location of back pain:  Right middle of back  Description of back pain: dull ache  Back pain spreads: right buttocks    Since patient first noticed back pain, pain is: always present, but gets better and worse  Does back pain interfere with her job:  Yes      Migraines:   Since the patient's last clinic visit, headaches are: worsened  The patient is getting headaches:  7 per month  She is not able to do normal daily activities when she has a migraine.  The patient is taking the following rescue/relief medications:  Excedrin and sumatriptan (Imitrex)   Patient states \"The relief is inconsistent\" from the rescue/relief medications.   The patient is taking the following medications to prevent migraines:  No medications to prevent " migraines  In the past 4 weeks, the patient has gone to an Urgent Care or Emergency Room 0 times times due to headaches.    She eats 2-3 servings of fruits and vegetables daily.She consumes 0 sweetened beverage(s) daily.She exercises with enough effort to increase her heart rate 10 to 19 minutes per day.  She exercises with enough effort to increase her heart rate 3 or less days per week.   She is taking medications regularly.     Last October, she saw me for her back.  Did PT.  Got some good tips/exercises to do.  She does still have some back pain.  Tailbone is improving at this point.  Still has midline low back pain.  Only radiates with flares.      Has been off Mobic for 2 weeks.  Has noted some worsening since being off that.  Has been nice to not take ibuprofen for a long time.  Takes flexeril occasionally.        Her migraines have been more frequent.  Got a pretty bad one last weekend.  Definitely related to her menstrual cycle usually, but this one didn't correlate.      Knuckle on right hand has been hurting for a couple of months.  She thought she might have been hitting it on things, but now realizes it's probably something else.        Review of Systems   Constitutional, HEENT, cardiovascular, pulmonary, gi and gu systems are negative, except as otherwise noted.      Objective    /80 (BP Location: Right arm, Patient Position: Sitting, Cuff Size: Adult Large)   Pulse 64   Temp 98.9  F (37.2  C) (Temporal)   Resp 16   Wt 100.5 kg (221 lb 9.6 oz)   SpO2 100%   BMI 35.77 kg/m    Body mass index is 35.77 kg/m .  Physical Exam   GENERAL: healthy, alert and no distress  NECK: no adenopathy, no asymmetry, masses, or scars and thyroid normal to palpation  RESP: lungs clear to auscultation - no rales, rhonchi or wheezes  CV: regular rate and rhythm, normal S1 S2, no S3 or S4, no murmur, click or rub, no peripheral edema and peripheral pulses strong  ABDOMEN: soft, nontender, no hepatosplenomegaly, no  masses and bowel sounds normal  MS: lower lumbar midline pain.    Orders Only on 06/16/2020   Component Date Value Ref Range Status     TSH 06/16/2020 1.44  0.40 - 4.00 mU/L Final     Results for orders placed or performed in visit on 09/17/21   Estradiol     Status: None   Result Value Ref Range    Estradiol 152 pg/mL   Lutropin     Status: None   Result Value Ref Range    Lutropin 4.9 IU/L   Follicle stimulating hormone     Status: None   Result Value Ref Range    FSH 3.2 IU/L   Basic metabolic panel  (Ca, Cl, CO2, Creat, Gluc, K, Na, BUN)     Status: Abnormal   Result Value Ref Range    Sodium 139 133 - 144 mmol/L    Potassium 4.0 3.4 - 5.3 mmol/L    Chloride 109 94 - 109 mmol/L    Carbon Dioxide (CO2) 27 20 - 32 mmol/L    Anion Gap 3 3 - 14 mmol/L    Urea Nitrogen 13 7 - 30 mg/dL    Creatinine 0.95 0.52 - 1.04 mg/dL    Calcium 8.9 8.5 - 10.1 mg/dL    Glucose 108 (H) 70 - 99 mg/dL    GFR Estimate 72 >60 mL/min/1.73m2   CBC with platelets     Status: Abnormal   Result Value Ref Range    WBC Count 14.5 (H) 4.0 - 11.0 10e3/uL    RBC Count 4.60 3.80 - 5.20 10e6/uL    Hemoglobin 14.6 11.7 - 15.7 g/dL    Hematocrit 42.0 35.0 - 47.0 %    MCV 91 78 - 100 fL    MCH 31.7 26.5 - 33.0 pg    MCHC 34.8 31.5 - 36.5 g/dL    RDW 12.9 10.0 - 15.0 %    Platelet Count 311 150 - 450 10e3/uL   TSH WITH FREE T4 REFLEX     Status: Normal   Result Value Ref Range    TSH 1.43 0.40 - 4.00 mU/L   Lipid panel reflex to direct LDL Non-fasting     Status: None   Result Value Ref Range    Cholesterol 148 <200 mg/dL    Triglycerides 64 <150 mg/dL    Direct Measure HDL 58 >=50 mg/dL    LDL Cholesterol Calculated 77 <=100 mg/dL    Non HDL Cholesterol 90 <130 mg/dL    Patient Fasting > 8hrs? No     Narrative    Cholesterol  Desirable:  <200 mg/dL    Triglycerides  Normal:  Less than 150 mg/dL  Borderline High:  150-199 mg/dL  High:  200-499 mg/dL  Very High:  Greater than or equal to 500 mg/dL    Direct Measure HDL  Female:  Greater than or equal  to 50 mg/dL   Male:  Greater than or equal to 40 mg/dL    LDL Cholesterol  Desirable:  <100mg/dL  Above Desirable:  100-129 mg/dL   Borderline High:  130-159 mg/dL   High:  160-189 mg/dL   Very High:  >= 190 mg/dL    Non HDL Cholesterol  Desirable:  130 mg/dL  Above Desirable:  130-159 mg/dL  Borderline High:  160-189 mg/dL  High:  190-219 mg/dL  Very High:  Greater than or equal to 220 mg/dL     No results found for this or any previous visit (from the past 24 hour(s)).

## 2021-09-17 NOTE — PATIENT INSTRUCTIONS
Thank you for visiting [facility]    Can try glucosamine (1500 mg/day) for any arthritis component of the knuckle pain.  Should take for four weeks before deciding it's not helping.    Tylenol/Mobic can help too.      If you decide you'd like an X-ray, I can order one.    Let's try amitriptyline to help with headache prevention and back pain.      If your back continues to worsen despite this, we can consider repeat imaging or one of the other medications we discussed (duloxetine, gabapentin, lyrica).      For migraines, we can consider other options too if needed.      We'll let you know your lab results as soon as we can.    Contact us or return if questions or concerns.     Have a nice day!    Dr. Warner     Return in about 4 weeks (around 10/15/2021) for E-visit, video, or phone visit, Recheck.      If you need medication refills, please contact your pharmacy 3 days before your prescriptions runs out or download the [facility] stefanie for your smart phone. If you are out of refills, your pharmacy will contact contact the clinic.                                     MyChart Assistance [phone]

## 2021-09-18 NOTE — RESULT ENCOUNTER NOTE
Amanda,    Majority of labs were normal for you.  Your reproductive hormones would typically indicate you are in the last half of your cycle.  If that is the case, these are in the normal range.  If you are not in the last half of your cycle, then these could indicate some irregularity that might be contributing to your symptoms.  It does not appear that menopause is imminent.  Because of that, we could consider hormonal therapy to help with your symptoms if needed.    Your blood sugar was in the prediabetic range.  Continue to work on activity, weight loss, and healthy eating to keep this under control.    Your white count was mildly elevated.  This may be nothing, but if you are having any symptoms of infection, please let me know.    Have a nice day!    Dr. Warner

## 2021-09-20 LAB — HCV AB SERPL QL IA: NONREACTIVE

## 2021-11-04 NOTE — PROGRESS NOTES
"  Assessment & Plan     Intractable back pain  No much improvement.  As it has been approximately 18 months since her last MRI and she has not really responded to other conservative measures, will obtain a repeat MRI.  Further management based upon results.  I did offer her a trial of an SNRI or gabapentin, but patient wished to proceed with imaging first.  - MR Lumbar Spine w/o Contrast; Future    Intractable migraine with aura without status migrainosus  Much improved with the amitriptyline.  Patient is happy with current regimen and will we will continue this at this time.  Unfortunately, this has not helped much with her back.    Portions of this note were completed using Dragon dictation software.  Although reviewed, there may be typographical and other inadvertent errors that remain.       Ordering of each unique test  Prescription drug management         BMI:   Estimated body mass index is 35.53 kg/m  as calculated from the following:    Height as of this encounter: 1.676 m (5' 5.98\").    Weight as of this encounter: 99.8 kg (220 lb).   Weight management plan: Discussed healthy diet and exercise guidelines    Patient Instructions   Thank you for visiting [facility]    Let's get your MRI done.    If nothing that needs intervention, we can consider a trial of Cymbalta.      Contact us or return if questions or concerns.     Have a nice day!    Dr. Warner     No follow-ups on file.      If you need medication refills, please contact your pharmacy 3 days before your prescriptions runs out or download the [facility] stefanie for your smart phone. If you are out of refills, your pharmacy will contact contact the clinic.                                     [facility] [phone]                       Return in about 6 months (around 5/5/2022) for Physical Exam.    Sonny Warner MD, MD  [facility] LEONARD Patel is a 45 year old who presents for the " "following health issues     History of Present Illness       Back Pain:  She presents for follow up of back pain. Patient's back pain is a recurring problem.  Location of back pain:  Right lower back  Description of back pain: dull ache  Back pain spreads: right buttocks    Since patient first noticed back pain, pain is: always present, but gets better and worse  Does back pain interfere with her job:  No      She eats 2-3 servings of fruits and vegetables daily.She consumes 1 sweetened beverage(s) daily.She exercises with enough effort to increase her heart rate 10 to 19 minutes per day.  She exercises with enough effort to increase her heart rate 3 or less days per week.   She is taking medications regularly.     Her back is about the same.  Still feeling pain in the midline lower back and radiating to the right buttock, but not down the leg.  No weakness.  No change in bowel/bladder function.      Her migraines are much better.  Amitriptyline has helped considerably with this.       not really taking flexeril very often.      Takes Mobic every morning.  Does note some pain improvement with this.    Sleep has been OK.          Review of Systems   Constitutional, HEENT, cardiovascular, pulmonary, gi and gu systems are negative, except as otherwise noted.      Objective    /82   Pulse 106   Temp 98  F (36.7  C) (Temporal)   Resp 20   Ht 1.676 m (5' 5.98\")   Wt 99.8 kg (220 lb)   SpO2 97%   BMI 35.53 kg/m    Body mass index is 35.53 kg/m .  Physical Exam   GENERAL: healthy, alert and no distress  NECK: no adenopathy, no asymmetry, masses, or scars and thyroid normal to palpation  RESP: lungs clear to auscultation - no rales, rhonchi or wheezes  CV: regular rate and rhythm, normal S1 S2, no S3 or S4, no murmur, click or rub, no peripheral edema and peripheral pulses strong  ABDOMEN: soft, nontender, no hepatosplenomegaly, no masses and bowel sounds normal  MS: no gross musculoskeletal defects noted, no " edema    Office Visit on 09/17/2021   Component Date Value Ref Range Status     Estradiol 09/17/2021 152  pg/mL Final    Estradiol reference ranges for pre-menopausal females:  Follicular:  pg/mL  Mid-cycle:  pg/mL  Luteal:  pg/mL    Post menopausal females (untreated)  Not determined - 32.2  Ultrasensitive estradial (ES2) recommended.     Lutropin 09/17/2021 4.9  IU/L Final    FEMALE:  Age  1 to 15 days: Not established   16 days to 6 years: 0.3-1.9 IU/L   7 to 8 years: 3.0 U/L or less  9 to 10 years: 4.0 U/L or less  11 years: 6.5 U/L or less  12 years: 0.4-9.9 IU/L   13 years: 0.3-5.4 IU/L  14 years: 0.5-31.2 IU/L  15 years: 0.5-20.7 IU/L  16 years: 0.4-29.4 IU/L  17 years: 1.6-12.4 IU/L     Premenopausal, 18 and older:   Follicular Phase: 1.9-12.5 IU/L  Mid-cycle Peak: 8.7-76.3 IU/L  Luteal Phase: 5-16.9  IU/L  Postmenopausal: 15.9-54.0 IU/L    Female Killian Stages   Stage I: 2.0 IU/L or less   Stage II: 6.5 IU/L or less   Stage III: 0.3-17.2 IU/L  Stage IV:  0.5-26.3 IU/L   Stage V: 0.6-13.7 IU/L    Puberty onset (transition from Killian Stage I to Killian Stage II) occurs for girls at a median age of 10.5 (Æ-Æf2) years. There is evidence that it may occur up to 1 year earlier in obese girls and in  girls. Progression through Killian stages is variable. Killian Stage V (adult) should be reached by age 18.       FSH 09/17/2021 3.2  IU/L Final    FEMALE:   Age                         0 to 7 days: 3.4 U/L or less   8 to 15 days: 1.0 U/L or less  16 days to 10 years: 0.3-6.9 U/L  11 years: 0.4-9.0 U/L   12 years: 1.0-17.2 U/L   13 years: 1.8-9.9 U/L   14 to 16 years: 0.9-12.4 U/L   17 years: 1.2-9.6 U/L     Premenopausal, 18 and older:   Follicular: 2.5-10.2 U/L  Mid-cycle: 3.4-33.4 U/L  Luteal: 1.5-9.1 U/L  Postmenopausal: 23.0-116.3 U/L    Female Killian Stages   Stage I: 0.4-6.7 IU/L   Stage II: 0.5-8.7 IU/L   Stage III: 1.2-11.4 IU/L   Stage IV: 0.7-12.8 IU/L   Stage V: 1.0-11.6  IU/L     Puberty onset (transition from Killian Stage I to Killian Stage II) occurs for girls at a median age of 10.5 years.   There is evidence that it may occur up to 1 year earlier in obese girls and in  girls.   Progression through Killian stages is variable. Killian Stage V (adult) should be reached by age 18.      Sodium 09/17/2021 139  133 - 144 mmol/L Final     Potassium 09/17/2021 4.0  3.4 - 5.3 mmol/L Final     Chloride 09/17/2021 109  94 - 109 mmol/L Final     Carbon Dioxide (CO2) 09/17/2021 27  20 - 32 mmol/L Final     Anion Gap 09/17/2021 3  3 - 14 mmol/L Final     Urea Nitrogen 09/17/2021 13  7 - 30 mg/dL Final     Creatinine 09/17/2021 0.95  0.52 - 1.04 mg/dL Final     Calcium 09/17/2021 8.9  8.5 - 10.1 mg/dL Final     Glucose 09/17/2021 108* 70 - 99 mg/dL Final     GFR Estimate 09/17/2021 72  >60 mL/min/1.73m2 Final    As of July 11, 2021, eGFR is calculated by the CKD-EPI creatinine equation, without race adjustment. eGFR can be influenced by muscle mass, exercise, and diet. The reported eGFR is an estimation only and is only applicable if the renal function is stable.     WBC Count 09/17/2021 14.5* 4.0 - 11.0 10e3/uL Final     RBC Count 09/17/2021 4.60  3.80 - 5.20 10e6/uL Final     Hemoglobin 09/17/2021 14.6  11.7 - 15.7 g/dL Final     Hematocrit 09/17/2021 42.0  35.0 - 47.0 % Final     MCV 09/17/2021 91  78 - 100 fL Final     MCH 09/17/2021 31.7  26.5 - 33.0 pg Final     MCHC 09/17/2021 34.8  31.5 - 36.5 g/dL Final     RDW 09/17/2021 12.9  10.0 - 15.0 % Final     Platelet Count 09/17/2021 311  150 - 450 10e3/uL Final     TSH 09/17/2021 1.43  0.40 - 4.00 mU/L Final     Cholesterol 09/17/2021 148  <200 mg/dL Final     Triglycerides 09/17/2021 64  <150 mg/dL Final     Direct Measure HDL 09/17/2021 58  >=50 mg/dL Final     LDL Cholesterol Calculated 09/17/2021 77  <=100 mg/dL Final     Non HDL Cholesterol 09/17/2021 90  <130 mg/dL Final     Patient Fasting > 8hrs? 09/17/2021 No   Final      Hepatitis C Antibody 09/17/2021 Nonreactive  Nonreactive Final

## 2021-11-05 ENCOUNTER — OFFICE VISIT (OUTPATIENT)
Dept: FAMILY MEDICINE | Facility: OTHER | Age: 46
End: 2021-11-05
Payer: COMMERCIAL

## 2021-11-05 ENCOUNTER — HOSPITAL ENCOUNTER (OUTPATIENT)
Dept: MAMMOGRAPHY | Facility: CLINIC | Age: 46
Discharge: HOME | End: 2021-11-05
Attending: FAMILY MEDICINE | Admitting: FAMILY MEDICINE
Payer: COMMERCIAL

## 2021-11-05 ENCOUNTER — MYC MEDICAL ADVICE (OUTPATIENT)
Dept: FAMILY MEDICINE | Facility: OTHER | Age: 46
End: 2021-11-05

## 2021-11-05 VITALS
DIASTOLIC BLOOD PRESSURE: 82 MMHG | RESPIRATION RATE: 20 BRPM | HEIGHT: 66 IN | WEIGHT: 220 LBS | SYSTOLIC BLOOD PRESSURE: 126 MMHG | TEMPERATURE: 98 F | BODY MASS INDEX: 35.36 KG/M2 | HEART RATE: 106 BPM | OXYGEN SATURATION: 97 %

## 2021-11-05 DIAGNOSIS — G43.119 INTRACTABLE MIGRAINE WITH AURA WITHOUT STATUS MIGRAINOSUS: ICD-10-CM

## 2021-11-05 DIAGNOSIS — Z12.31 VISIT FOR SCREENING MAMMOGRAM: ICD-10-CM

## 2021-11-05 DIAGNOSIS — M54.9 INTRACTABLE BACK PAIN: ICD-10-CM

## 2021-11-05 PROCEDURE — 77063 BREAST TOMOSYNTHESIS BI: CPT

## 2021-11-05 PROCEDURE — 99214 OFFICE O/P EST MOD 30 MIN: CPT | Performed by: FAMILY MEDICINE

## 2021-11-05 ASSESSMENT — MIFFLIN-ST. JEOR: SCORE: 1654.41

## 2021-11-05 ASSESSMENT — PAIN SCALES - GENERAL: PAINLEVEL: NO PAIN (1)

## 2021-11-05 NOTE — PATIENT INSTRUCTIONS
Thank you for visiting [facility]    Let's get your MRI done.    If nothing that needs intervention, we can consider a trial of Cymbalta.      Contact us or return if questions or concerns.     Have a nice day!    Dr. Warner     No follow-ups on file.      If you need medication refills, please contact your pharmacy 3 days before your prescriptions runs out or download the [facility] stefanie for your smart phone. If you are out of refills, your pharmacy will contact contact the clinic.                                     MyChart Assistance [phone]

## 2021-11-11 ENCOUNTER — MYC MEDICAL ADVICE (OUTPATIENT)
Dept: FAMILY MEDICINE | Facility: OTHER | Age: 46
End: 2021-11-11
Payer: COMMERCIAL

## 2021-11-16 ENCOUNTER — HOSPITAL ENCOUNTER (OUTPATIENT)
Dept: MRI IMAGING | Facility: CLINIC | Age: 46
Discharge: HOME | End: 2021-11-16
Attending: FAMILY MEDICINE | Admitting: FAMILY MEDICINE
Payer: COMMERCIAL

## 2021-11-16 DIAGNOSIS — M54.9 INTRACTABLE BACK PAIN: ICD-10-CM

## 2021-11-16 PROCEDURE — 72148 MRI LUMBAR SPINE W/O DYE: CPT

## 2021-11-17 NOTE — RESULT ENCOUNTER NOTE
Amanda,    MRI has not changed significantly from 2020. Still some arthritis in the lumbar spine. If your symptoms are not improving over time, we can consider other medications for pain management.    Have a nice day!    Dr. Warner

## 2021-12-07 NOTE — PROGRESS NOTES
Assessment & Plan     Seborrheic keratosis  Discussed the nature of this and options for management.  We will continue to monitor for now.  If clinically changing, would have low threshold for excision to ensure this is not something more concerning.  If this becomes irritated or begins catching on things, can treat with cryotherapy.    Infected cyst of skin  All but resolved.  Discussed that if this recurs, we can treat with antibiotics for possible incision and drainage.  This may not recur.    Intractable back pain  Not significantly improved with current regimen.  She will try some chiropractic care.  Would be happy to refer if needed.  Can also refer to spine therapy or pain management if she would like.    High priority for 2019-nCoV vaccine  Immunized.    Portions of this note were completed using Dragon dictation software.  Although reviewed, there may be typographical and other inadvertent errors that remain.         Review of the result(s) of each unique test - Lumbar MRI         Patient Instructions   Thank you for visiting [facility]    If the dark spot changes, we should remove it.  If it gets irritated, we can freeze it.    If the other lump recurs, we can treat with antibiotics or consider surgical drainage.      Good luck with your back pain.  Let me know if you'd like to see a specialist about this.    Contact us or return if questions or concerns.     Have a nice day!    Dr. Warner     No follow-ups on file.      If you need medication refills, please contact your pharmacy 3 days before your prescriptions runs out or download the [facility] stefanie for your smart phone. If you are out of refills, your pharmacy will contact contact the clinic.                                     MyChart Assistance [phone]                       Return for Physical Exam.    Sonny Warner MD, MD  [facility] LEONARD Patel is a 46 year old who presents  for the following health issues     History of Present Illness       She eats 2-3 servings of fruits and vegetables daily.She consumes 0 sweetened beverage(s) daily.She exercises with enough effort to increase her heart rate 10 to 19 minutes per day.  She exercises with enough effort to increase her heart rate 3 or less days per week.   She is taking medications regularly.       Patient coming in with lump and mole on left  ear. Mole has been on ear for 1 year and lump has been there for 3-4 weeks and has been painful.    She noted the mole about a year ago.  Hasn't really changed that she's aware of, but can't really see on her own (back of ear).    She noted the nodule about 3-4 weeks ago.  It was painful.  Has since drained some spontaneously. and smaller, less painful now.        Review of Systems   Constitutional, HEENT, cardiovascular, pulmonary, gi and gu systems are negative, except as otherwise noted.      Objective    /80   Pulse 78   Temp 97.8  F (36.6  C) (Temporal)   Resp 17   Wt 101.6 kg (224 lb)   SpO2 99%   BMI 36.17 kg/m    Body mass index is 36.17 kg/m .  Physical Exam   GENERAL: healthy, alert and no distress  SKIN: SK on left posterior ear.  Area of redness where her lesion drained, suspect spontaneously drained infected skin cyst.    Office Visit on 09/17/2021   Component Date Value Ref Range Status     Estradiol 09/17/2021 152  pg/mL Final    Estradiol reference ranges for pre-menopausal females:  Follicular:  pg/mL  Mid-cycle:  pg/mL  Luteal:  pg/mL    Post menopausal females (untreated)  Not determined - 32.2  Ultrasensitive estradial (ES2) recommended.     Lutropin 09/17/2021 4.9  IU/L Final    FEMALE:  Age  1 to 15 days: Not established   16 days to 6 years: 0.3-1.9 IU/L   7 to 8 years: 3.0 U/L or less  9 to 10 years: 4.0 U/L or less  11 years: 6.5 U/L or less  12 years: 0.4-9.9 IU/L   13 years: 0.3-5.4 IU/L  14 years: 0.5-31.2 IU/L  15 years: 0.5-20.7 IU/L  16  years: 0.4-29.4 IU/L  17 years: 1.6-12.4 IU/L     Premenopausal, 18 and older:   Follicular Phase: 1.9-12.5 IU/L  Mid-cycle Peak: 8.7-76.3 IU/L  Luteal Phase: 5-16.9  IU/L  Postmenopausal: 15.9-54.0 IU/L    Female Killian Stages   Stage I: 2.0 IU/L or less   Stage II: 6.5 IU/L or less   Stage III: 0.3-17.2 IU/L  Stage IV:  0.5-26.3 IU/L   Stage V: 0.6-13.7 IU/L    Puberty onset (transition from Killian Stage I to Killian Stage II) occurs for girls at a median age of 10.5 (Æ-Æf2) years. There is evidence that it may occur up to 1 year earlier in obese girls and in  girls. Progression through Killian stages is variable. Killian Stage V (adult) should be reached by age 18.       FSH 09/17/2021 3.2  IU/L Final    FEMALE:   Age                         0 to 7 days: 3.4 U/L or less   8 to 15 days: 1.0 U/L or less  16 days to 10 years: 0.3-6.9 U/L  11 years: 0.4-9.0 U/L   12 years: 1.0-17.2 U/L   13 years: 1.8-9.9 U/L   14 to 16 years: 0.9-12.4 U/L   17 years: 1.2-9.6 U/L     Premenopausal, 18 and older:   Follicular: 2.5-10.2 U/L  Mid-cycle: 3.4-33.4 U/L  Luteal: 1.5-9.1 U/L  Postmenopausal: 23.0-116.3 U/L    Female Killian Stages   Stage I: 0.4-6.7 IU/L   Stage II: 0.5-8.7 IU/L   Stage III: 1.2-11.4 IU/L   Stage IV: 0.7-12.8 IU/L   Stage V: 1.0-11.6 IU/L     Puberty onset (transition from Killain Stage I to Killian Stage II) occurs for girls at a median age of 10.5 years.   There is evidence that it may occur up to 1 year earlier in obese girls and in  girls.   Progression through Killian stages is variable. Killian Stage V (adult) should be reached by age 18.      Sodium 09/17/2021 139  133 - 144 mmol/L Final     Potassium 09/17/2021 4.0  3.4 - 5.3 mmol/L Final     Chloride 09/17/2021 109  94 - 109 mmol/L Final     Carbon Dioxide (CO2) 09/17/2021 27  20 - 32 mmol/L Final     Anion Gap 09/17/2021 3  3 - 14 mmol/L Final     Urea Nitrogen 09/17/2021 13  7 - 30 mg/dL Final     Creatinine 09/17/2021 0.95   0.52 - 1.04 mg/dL Final     Calcium 09/17/2021 8.9  8.5 - 10.1 mg/dL Final     Glucose 09/17/2021 108* 70 - 99 mg/dL Final     GFR Estimate 09/17/2021 72  >60 mL/min/1.73m2 Final    As of July 11, 2021, eGFR is calculated by the CKD-EPI creatinine equation, without race adjustment. eGFR can be influenced by muscle mass, exercise, and diet. The reported eGFR is an estimation only and is only applicable if the renal function is stable.     WBC Count 09/17/2021 14.5* 4.0 - 11.0 10e3/uL Final     RBC Count 09/17/2021 4.60  3.80 - 5.20 10e6/uL Final     Hemoglobin 09/17/2021 14.6  11.7 - 15.7 g/dL Final     Hematocrit 09/17/2021 42.0  35.0 - 47.0 % Final     MCV 09/17/2021 91  78 - 100 fL Final     MCH 09/17/2021 31.7  26.5 - 33.0 pg Final     MCHC 09/17/2021 34.8  31.5 - 36.5 g/dL Final     RDW 09/17/2021 12.9  10.0 - 15.0 % Final     Platelet Count 09/17/2021 311  150 - 450 10e3/uL Final     TSH 09/17/2021 1.43  0.40 - 4.00 mU/L Final     Cholesterol 09/17/2021 148  <200 mg/dL Final     Triglycerides 09/17/2021 64  <150 mg/dL Final     Direct Measure HDL 09/17/2021 58  >=50 mg/dL Final     LDL Cholesterol Calculated 09/17/2021 77  <=100 mg/dL Final     Non HDL Cholesterol 09/17/2021 90  <130 mg/dL Final     Patient Fasting > 8hrs? 09/17/2021 No   Final     Hepatitis C Antibody 09/17/2021 Nonreactive  Nonreactive Final

## 2021-12-08 ENCOUNTER — OFFICE VISIT (OUTPATIENT)
Dept: FAMILY MEDICINE | Facility: OTHER | Age: 46
End: 2021-12-08
Payer: COMMERCIAL

## 2021-12-08 VITALS
SYSTOLIC BLOOD PRESSURE: 110 MMHG | RESPIRATION RATE: 17 BRPM | WEIGHT: 224 LBS | HEART RATE: 78 BPM | BODY MASS INDEX: 36.17 KG/M2 | DIASTOLIC BLOOD PRESSURE: 80 MMHG | TEMPERATURE: 97.8 F | OXYGEN SATURATION: 99 %

## 2021-12-08 DIAGNOSIS — L08.9 INFECTED CYST OF SKIN: ICD-10-CM

## 2021-12-08 DIAGNOSIS — L82.1 SEBORRHEIC KERATOSIS: Primary | ICD-10-CM

## 2021-12-08 DIAGNOSIS — L72.9 INFECTED CYST OF SKIN: ICD-10-CM

## 2021-12-08 DIAGNOSIS — M54.9 INTRACTABLE BACK PAIN: ICD-10-CM

## 2021-12-08 DIAGNOSIS — Z23 HIGH PRIORITY FOR 2019-NCOV VACCINE: ICD-10-CM

## 2021-12-08 PROCEDURE — 99213 OFFICE O/P EST LOW 20 MIN: CPT | Performed by: FAMILY MEDICINE

## 2021-12-08 NOTE — PATIENT INSTRUCTIONS
Thank you for visiting [facility]    If the dark spot changes, we should remove it.  If it gets irritated, we can freeze it.    If the other lump recurs, we can treat with antibiotics or consider surgical drainage.      Good luck with your back pain.  Let me know if you'd like to see a specialist about this.    Contact us or return if questions or concerns.     Have a nice day!    Dr. Warner     No follow-ups on file.      If you need medication refills, please contact your pharmacy 3 days before your prescriptions runs out or download the [facility] stefanie for your smart phone. If you are out of refills, your pharmacy will contact contact the clinic.                                     MyChart Assistance [phone]

## 2021-12-23 DIAGNOSIS — E03.9 ACQUIRED HYPOTHYROIDISM: ICD-10-CM

## 2021-12-24 RX ORDER — LEVOTHYROXINE SODIUM 100 UG/1
TABLET ORAL
Qty: 90 TABLET | Refills: 2 | Status: SHIPPED | OUTPATIENT
Start: 2021-12-24 | End: 2022-03-23

## 2022-02-12 ENCOUNTER — HEALTH MAINTENANCE LETTER (OUTPATIENT)
Age: 47
End: 2022-02-12

## 2022-03-14 DIAGNOSIS — M54.9 INTRACTABLE BACK PAIN: ICD-10-CM

## 2022-03-14 DIAGNOSIS — G43.119 INTRACTABLE MIGRAINE WITH AURA WITHOUT STATUS MIGRAINOSUS: ICD-10-CM

## 2022-03-15 RX ORDER — MELOXICAM 15 MG/1
TABLET ORAL
Qty: 90 TABLET | Refills: 0 | Status: SHIPPED | OUTPATIENT
Start: 2022-03-15 | End: 2022-06-28

## 2022-03-15 NOTE — TELEPHONE ENCOUNTER
"Pending Prescriptions:                       Disp   Refills    meloxicam (MOBIC) 15 MG tablet [Pharmacy M*90 tab*1        Sig: TAKE ONE TABLET BY MOUTH ONCE DAILY AS NEEDED FOR           MODERATE PAIN    Signed Prescriptions:                        Disp   Refills    amitriptyline (ELAVIL) 25 MG tablet        90 tab*0        Sig: TAKE 0.5-1 TABLET (12.5-25 MG) BY MOUTH AT BEDTIME  Authorizing Provider: JOSEPH GIRARD  Ordering User: JOHN SANDHU    Routing refill request to provider for review/approval because:      Requested Prescriptions   Pending Prescriptions Disp Refills    meloxicam (MOBIC) 15 MG tablet [Pharmacy Med Name: MELOXICAM 15MG TABS] 90 tablet 1     Sig: TAKE ONE TABLET BY MOUTH ONCE DAILY AS NEEDED FOR MODERATE PAIN        NSAID Medications Failed - 3/14/2022  5:00 PM        Failed - Normal ALT on file in past 12 months     Recent Labs   Lab Test 11/19/15  1528   ALT 17               Failed - Normal AST on file in past 12 months       Recent Labs   Lab Test 11/19/15  1528   AST 22             Failed - Normal CBC on file in past 12 months     Recent Labs   Lab Test 09/17/21  1655   WBC 14.5*   RBC 4.60   HGB 14.6   HCT 42.0                      Passed - Blood pressure under 140/90 in past 12 months       BP Readings from Last 3 Encounters:   12/08/21 110/80   11/05/21 126/82   09/17/21 120/80                 Passed - Recent (12 mo) or future (30 days) visit within the authorizing provider's specialty     Patient has had an office visit with the authorizing provider or a provider within the authorizing providers department within the previous 12 mos or has a future within next 30 days. See \"Patient Info\" tab in inbasket, or \"Choose Columns\" in Meds & Orders section of the refill encounter.              Passed - Patient is age 6-64 years        Passed - Medication is active on med list        Passed - No active pregnancy on record        Passed - Normal serum creatinine on file in " "past 12 months     Recent Labs   Lab Test 09/17/21  1655   CR 0.95       Ok to refill medication if creatinine is low          Passed - No positive pregnancy test in past 12 months          Signed Prescriptions Disp Refills    amitriptyline (ELAVIL) 25 MG tablet 90 tablet 0     Sig: TAKE 0.5-1 TABLET (12.5-25 MG) BY MOUTH AT BEDTIME        Tricyclic Agents ( Annual appt and no PHQ9) Passed - 3/14/2022  5:00 PM        Passed - Blood Pressure under 140/90 in past 12 mos       BP Readings from Last 3 Encounters:   12/08/21 110/80   11/05/21 126/82   09/17/21 120/80                 Passed - Recent (12 mo) or future (30 days) visit within authorizing provider's specialty     Patient has had an office visit with the authorizing provider or a provider within the authorizing providers department within the previous 12 mos or has a future within next 30 days. See \"Patient Info\" tab in inbasket, or \"Choose Columns\" in Meds & Orders section of the refill encounter.              Passed - Medication is active on med list        Passed - Patient is age 18 or older        Passed - Patient is not pregnant        Passed - No positive pregnancy test on record in past 12 mos                    "

## 2022-05-13 ENCOUNTER — OFFICE VISIT (OUTPATIENT)
Dept: FAMILY MEDICINE | Facility: OTHER | Age: 47
End: 2022-05-13
Payer: COMMERCIAL

## 2022-05-13 VITALS
TEMPERATURE: 98 F | WEIGHT: 214 LBS | OXYGEN SATURATION: 97 % | HEIGHT: 66 IN | DIASTOLIC BLOOD PRESSURE: 73 MMHG | HEART RATE: 89 BPM | BODY MASS INDEX: 34.39 KG/M2 | SYSTOLIC BLOOD PRESSURE: 113 MMHG

## 2022-05-13 DIAGNOSIS — Z12.4 CERVICAL CANCER SCREENING: ICD-10-CM

## 2022-05-13 DIAGNOSIS — Z00.00 ROUTINE GENERAL MEDICAL EXAMINATION AT A HEALTH CARE FACILITY: Primary | ICD-10-CM

## 2022-05-13 DIAGNOSIS — Z12.11 SCREEN FOR COLON CANCER: ICD-10-CM

## 2022-05-13 PROCEDURE — 99396 PREV VISIT EST AGE 40-64: CPT | Performed by: FAMILY MEDICINE

## 2022-05-13 PROCEDURE — 87624 HPV HI-RISK TYP POOLED RSLT: CPT | Performed by: FAMILY MEDICINE

## 2022-05-13 PROCEDURE — G0145 SCR C/V CYTO,THINLAYER,RESCR: HCPCS | Performed by: FAMILY MEDICINE

## 2022-05-13 PROCEDURE — G0124 SCREEN C/V THIN LAYER BY MD: HCPCS | Performed by: PATHOLOGY

## 2022-05-13 ASSESSMENT — ENCOUNTER SYMPTOMS
CONSTIPATION: 0
HEMATURIA: 0
HEADACHES: 0
BREAST MASS: 0
NERVOUS/ANXIOUS: 1
HEARTBURN: 0
EYE PAIN: 0
PARESTHESIAS: 0
ABDOMINAL PAIN: 0
SORE THROAT: 0
PALPITATIONS: 0
HEMATOCHEZIA: 0
JOINT SWELLING: 0
DIARRHEA: 0
MYALGIAS: 0
DIZZINESS: 0
FEVER: 0
NAUSEA: 0
FREQUENCY: 0
DYSURIA: 0
CHILLS: 0
SHORTNESS OF BREATH: 0
ARTHRALGIAS: 0
WEAKNESS: 0
COUGH: 0

## 2022-05-13 ASSESSMENT — PAIN SCALES - GENERAL: PAINLEVEL: NO PAIN (0)

## 2022-05-13 NOTE — PATIENT INSTRUCTIONS
We'll let you know your lab results as soon as we can.     Please schedule a colonoscopy.    I do strongly recommend Covid booster vaccination.  The risks of getting Covid greatly exceed the risks of vaccination.  Let me know what questions and concerns you have.     Contact us or return if questions or concerns.     Have a nice day!    Dr. Warner       Preventive Health Recommendations  Female Ages 40 to 49    Yearly exam:   See your health care provider every year in order to  Review health changes.   Discuss preventive care.    Review your medicines if your doctor prescribed any.    Get a Pap test every three years (unless you have an abnormal result and your provider advises testing more often).    If you get Pap tests with HPV test, you only need to test every 5 years, unless you have an abnormal result. You do not need a Pap test if your uterus was removed (hysterectomy) and you have not had cancer.    You should be tested each year for STDs (sexually transmitted diseases), if you're at risk.   Ask your doctor if you should have a mammogram.    Have a colonoscopy (test for colon cancer) if someone in your family has had colon cancer or polyps before age 50.     Have a cholesterol test every 5 years.     Have a diabetes test (fasting glucose) after age 45. If you are at risk for diabetes, you should have this test every 3 years.    Shots: Get a flu shot each year. Get a tetanus shot every 10 years.     Nutrition:   Eat at least 5 servings of fruits and vegetables each day.  Eat whole-grain bread, whole-wheat pasta and brown rice instead of white grains and rice.  Get adequate Calcium and Vitamin D.      Lifestyle  Exercise at least 150 minutes a week (an average of 30 minutes a day, 5 days a week). This will help you control your weight and prevent disease.  Limit alcohol to one drink per day.  No smoking.   Wear sunscreen to prevent skin cancer.  See your dentist every six months for an exam and cleaning.   Yes

## 2022-05-13 NOTE — PROGRESS NOTES
SUBJECTIVE:   CC: Amanda Pittman is an 46 year old woman who presents for preventive health visit.         Healthy Habits:     Getting at least 3 servings of Calcium per day:  Yes    Bi-annual eye exam:  Yes    Dental care twice a year:  Yes    Sleep apnea or symptoms of sleep apnea:  None    Diet:  Regular (no restrictions)    Frequency of exercise:  2-3 days/week    Duration of exercise:  15-30 minutes    Taking medications regularly:  Yes    Medication side effects:  None    PHQ-2 Total Score: 0    Additional concerns today:  No    Amitriptyline is helping with her HA.  Denies worrisome side effects.            Today's PHQ-2 Score:   PHQ-2 ( 1999 Pfizer) 5/13/2022   Q1: Little interest or pleasure in doing things 0   Q2: Feeling down, depressed or hopeless 0   PHQ-2 Score 0   PHQ-2 Total Score (12-17 Years)- Positive if 3 or more points; Administer PHQ-A if positive -   Q1: Little interest or pleasure in doing things Not at all   Q2: Feeling down, depressed or hopeless Not at all   PHQ-2 Score 0       Abuse: Current or Past (Physical, Sexual or Emotional) - No  Do you feel safe in your environment? Yes    Have you ever done Advance Care Planning? (For example, a Health Directive, POLST, or a discussion with a medical provider or your loved ones about your wishes): No, advance care planning information given to patient to review.  Patient declined advance care planning discussion at this time.    Social History     Tobacco Use     Smoking status: Never Smoker     Smokeless tobacco: Never Used     Tobacco comment: used to smoke occ   Substance Use Topics     Alcohol use: No         Alcohol Use 5/13/2022   Prescreen: >3 drinks/day or >7 drinks/week? Not Applicable   Prescreen: >3 drinks/day or >7 drinks/week? -       Reviewed orders with patient.  Reviewed health maintenance and updated orders accordingly - Yes  BP Readings from Last 3 Encounters:   05/13/22 113/73   12/08/21 110/80   11/05/21 126/82    Wt Readings  from Last 3 Encounters:   22 97.1 kg (214 lb)   21 101.6 kg (224 lb)   21 99.8 kg (220 lb)                  Patient Active Problem List   Diagnosis     Hypothyroidism     Infertility management     Female infertility     CARDIOVASCULAR SCREENING; LDL GOAL LESS THAN 160     S/P LEEP (loop electrosurgical excision procedure)     History of cone biopsy of cervix complicating pregnancy     Status post  delivery     CMC (carpometacarpal joint) sprains, right, initial encounter     Intractable back pain     Back pain     Past Surgical History:   Procedure Laterality Date     BIOPSY      Colposcopy      SECTION N/A 2014    Procedure:  SECTION;  Surgeon: Max Merida MD;  Location: [facility] L[facility]     COLONOSCOPY       HC COLP CERVIX/UPPER VAGINA W BX CERVIX/ENDOCERV CURETT  2006    SANDY 2/3, CIS      HC REMOVE TONSILS/ADENOIDS,12+ Y/O      Tonsils 11+y.o. tonsilectomy     LEEP TX, CERVICAL       TONSILLECTOMY  08    Right - sided     ZZC  DELIVERY ONLY  2003    , Low Cervical     ZZHC MARSUP BARTHOLIN GLAND CYST      x4       Social History     Tobacco Use     Smoking status: Never Smoker     Smokeless tobacco: Never Used     Tobacco comment: used to smoke occ   Substance Use Topics     Alcohol use: No     Family History   Problem Relation Age of Onset     Cancer Maternal Grandmother         uterus cancer     Heart Disease Paternal Grandfather          of MI     Heart Disease Maternal Grandfather      Cancer Mother 72        myeloma     Other Cancer Mother         Multiple myeloma 2015     Heart Disease Father          Current Outpatient Medications   Medication Sig Dispense Refill     acetaminophen (TYLENOL) 325 MG tablet Take 2 tablets (650 mg) by mouth every 4 hours as needed for mild pain       amitriptyline (ELAVIL) 25 MG tablet TAKE 0.5-1 TABLET (12.5-25 MG) BY MOUTH AT BEDTIME 90 tablet 0     aspirin-acetaminophen-caffeine  (EXCEDRIN MIGRAINE) 250-250-65 MG tablet Take 1 tablet by mouth every 6 hours as needed for headaches       cyclobenzaprine (FLEXERIL) 10 MG tablet Take 1 tablet (10 mg) by mouth 3 times daily as needed for muscle spasms 90 tablet 1     levothyroxine (SYNTHROID/LEVOTHROID) 100 MCG tablet Take 1 tablet (100 mcg) by mouth daily 14 tablet 0     meloxicam (MOBIC) 15 MG tablet TAKE ONE TABLET BY MOUTH ONCE DAILY AS NEEDED FOR MODERATE PAIN 90 tablet 0     SUMAtriptan (IMITREX) 50 MG tablet Take 1 tablet (50 mg) by mouth at onset of headache for migraine 12 tablet 1     Allergies   Allergen Reactions     Reasnor Other (See Comments)     Sores in mouth     Percocet [Oxycodone-Acetaminophen] Other (See Comments)     Tongue swelling and hives     Recent Labs   Lab Test 09/17/21  1655 06/16/20  1513 03/27/20  0558 01/25/17  1416 11/19/15  1528   LDL 77  --   --   --  71   HDL 58  --   --   --  48*   TRIG 64  --   --   --  178*   ALT  --   --   --   --  17   CR 0.95  --  0.95  --  0.75   GFRESTIMATED 72  --  72  --  85   GFRESTBLACK  --   --  84  --  >90   GFR Calc     POTASSIUM 4.0  --  4.3  --  4.5   TSH 1.43 1.44  --    < > 3.60    < > = values in this interval not displayed.        Breast Cancer Screening:    Breast CA Risk Assessment (FHS-7) 5/13/2022   Do you have a family history of breast, colon, or ovarian cancer? No / Unknown         Mammogram Screening: Recommended annual mammography  Pertinent mammograms are reviewed under the imaging tab.    History of abnormal Pap smear: NO - age 30-65 PAP every 5 years with negative HPV co-testing recommended  PAP / HPV Latest Ref Rng & Units 5/9/2019 5/9/2014 6/14/2013   PAP (Historical) - NIL NIL NIL   HPV16 NEG:Negative Negative - -   HPV18 NEG:Negative Negative - -   HRHPV NEG:Negative Negative - -     Reviewed and updated as needed this visit by clinical staff                    Reviewed and updated as needed this visit by Provider                  "      Review of Systems   Constitutional: Negative for chills and fever.   HENT: Negative for congestion, ear pain, hearing loss and sore throat.    Eyes: Negative for pain and visual disturbance.   Respiratory: Negative for cough and shortness of breath.    Cardiovascular: Negative for chest pain, palpitations and peripheral edema.   Gastrointestinal: Negative for abdominal pain, constipation, diarrhea, heartburn, hematochezia and nausea.   Breasts:  Negative for tenderness, breast mass and discharge.   Genitourinary: Negative for dysuria, frequency, genital sores, hematuria, pelvic pain, urgency, vaginal bleeding and vaginal discharge.   Musculoskeletal: Negative for arthralgias, joint swelling and myalgias.   Skin: Negative for rash.   Neurological: Negative for dizziness, weakness, headaches and paresthesias.   Psychiatric/Behavioral: Negative for mood changes. The patient is nervous/anxious.           OBJECTIVE:   /73 (BP Location: Left arm)   Pulse 89   Temp 98  F (36.7  C) (Oral)   Ht 1.675 m (5' 5.95\")   Wt 97.1 kg (214 lb)   SpO2 97%   BMI 34.60 kg/m    Physical Exam  GENERAL: healthy, alert and no distress  EYES: Eyes grossly normal to inspection, PERRL and conjunctivae and sclerae normal  HENT: ear canals and TM's normal, nose and mouth without ulcers or lesions  NECK: no adenopathy, no asymmetry, masses, or scars and thyroid normal to palpation  RESP: lungs clear to auscultation - no rales, rhonchi or wheezes  BREAST: declined  CV: regular rate and rhythm, normal S1 S2, no S3 or S4, no murmur, click or rub, no peripheral edema and peripheral pulses strong  ABDOMEN: soft, nontender, no hepatosplenomegaly, no masses and bowel sounds normal   (female): normal female external genitalia, normal urethral meatus , vaginal mucosa pink, moist, well rugated and cervix shows evidence of previous aggressive LEEP excision.  Otherwise normal.  MS: no gross musculoskeletal defects noted, no edema  SKIN: " "no suspicious lesions or rashes  NEURO: Normal strength and tone, mentation intact and speech normal  PSYCH: mentation appears normal, affect normal/bright    Diagnostic Test Results:  Labs reviewed in Epic  No results found for this or any previous visit (from the past 24 hour(s)).  No results found for any visits on 05/13/22.    ASSESSMENT/PLAN:       ICD-10-CM    1. Routine general medical examination at a health care facility  Z00.00    2. Screen for colon cancer  Z12.11 Adult Gastro Ref - Procedure Only   3. Cervical cancer screening  Z12.4 Pap Screen with HPV - recommended age 30 - 65 years     Reviewed recommended screenings and ordered appropriate testing for pt's risks and per pt's request(s).       COUNSELING:  Reviewed preventive health counseling, as reflected in patient instructions       Regular exercise       Healthy diet/nutrition       Osteoporosis prevention/bone health       Colorectal Cancer Screening    Estimated body mass index is 34.6 kg/m  as calculated from the following:    Height as of this encounter: 1.675 m (5' 5.95\").    Weight as of this encounter: 97.1 kg (214 lb).    Weight management plan: Discussed healthy diet and exercise guidelines    She reports that she has never smoked. She has never used smokeless tobacco.      Counseling Resources:  ATP IV Guidelines  Pooled Cohorts Equation Calculator  Breast Cancer Risk Calculator  BRCA-Related Cancer Risk Assessment: FHS-7 Tool  FRAX Risk Assessment  ICSI Preventive Guidelines  Dietary Guidelines for Americans, 2010  USDA's MyPlate  ASA Prophylaxis  Lung CA Screening    Sonny Warner MD, MD  [facility]  "

## 2022-05-19 LAB
BKR LAB AP GYN ADEQUACY: ABNORMAL
BKR LAB AP GYN INTERPRETATION: ABNORMAL
BKR LAB AP GYN OTHER FINDINGS: ABNORMAL
BKR LAB AP HPV REFLEX: ABNORMAL
BKR LAB AP PREVIOUS ABNORMAL: ABNORMAL
PATH REPORT.COMMENTS IMP SPEC: ABNORMAL
PATH REPORT.COMMENTS IMP SPEC: ABNORMAL
PATH REPORT.RELEVANT HX SPEC: ABNORMAL

## 2022-05-23 ENCOUNTER — TELEPHONE (OUTPATIENT)
Dept: FAMILY MEDICINE | Facility: OTHER | Age: 47
End: 2022-05-23
Payer: COMMERCIAL

## 2022-05-23 LAB
HUMAN PAPILLOMA VIRUS 16 DNA: NEGATIVE
HUMAN PAPILLOMA VIRUS 18 DNA: NEGATIVE
HUMAN PAPILLOMA VIRUS FINAL DIAGNOSIS: NORMAL
HUMAN PAPILLOMA VIRUS OTHER HR: NEGATIVE

## 2022-05-23 NOTE — TELEPHONE ENCOUNTER
Called And spoke around to Amanda and she stated that she gets her period. Let her know her pap was normal.    Tawana Dennis, CMA

## 2022-05-23 NOTE — TELEPHONE ENCOUNTER
----- Message from Lillie Gross RN sent at 5/23/2022  8:12 AM CDT -----  Bo Warner,   Please review and advise.   45 yo with current NIL Pap, Neg HR HPV, also showing endometrial cells. No LMP recorded. Would you recommend further follow up due to presence of endometrial cells on pap?    Thank you,   FREDY Green, RN-BC

## 2022-06-26 DIAGNOSIS — G43.119 INTRACTABLE MIGRAINE WITH AURA WITHOUT STATUS MIGRAINOSUS: ICD-10-CM

## 2022-06-26 DIAGNOSIS — Z51.81 ENCOUNTER FOR THERAPEUTIC DRUG MONITORING: Primary | ICD-10-CM

## 2022-06-26 DIAGNOSIS — M54.9 INTRACTABLE BACK PAIN: ICD-10-CM

## 2022-06-27 NOTE — TELEPHONE ENCOUNTER
"Pending Prescriptions:                       Disp   Refills    meloxicam (MOBIC) 15 MG tablet [Pharmacy M*90 tab*0        Sig: TAKE ONE TABLET BY MOUTH ONCE DAILY AS NEEDED FOR           MODERATE PAIN    Routing refill request to provider for review/approval because:  Labs not current:  ALT & AST    Requested Prescriptions   Pending Prescriptions Disp Refills    meloxicam (MOBIC) 15 MG tablet [Pharmacy Med Name: MELOXICAM 15MG TABS] 90 tablet 0     Sig: TAKE ONE TABLET BY MOUTH ONCE DAILY AS NEEDED FOR MODERATE PAIN        NSAID Medications Failed - 6/26/2022  6:49 PM        Failed - Normal ALT on file in past 12 months     Recent Labs   Lab Test 11/19/15  1528   ALT 17               Failed - Normal AST on file in past 12 months       Recent Labs   Lab Test 11/19/15  1528   AST 22             Failed - Normal CBC on file in past 12 months     Recent Labs   Lab Test 09/17/21  1655   WBC 14.5*   RBC 4.60   HGB 14.6   HCT 42.0                      Passed - Blood pressure under 140/90 in past 12 months       BP Readings from Last 3 Encounters:   05/13/22 113/73   12/08/21 110/80   11/05/21 126/82                 Passed - Recent (12 mo) or future (30 days) visit within the authorizing provider's specialty     Patient has had an office visit with the authorizing provider or a provider within the authorizing providers department within the previous 12 mos or has a future within next 30 days. See \"Patient Info\" tab in inbasket, or \"Choose Columns\" in Meds & Orders section of the refill encounter.              Passed - Patient is age 6-64 years        Passed - Medication is active on med list        Passed - No active pregnancy on record        Passed - Normal serum creatinine on file in past 12 months     Recent Labs   Lab Test 09/17/21  1655   CR 0.95       Ok to refill medication if creatinine is low          Passed - No positive pregnancy test in past 12 months              "

## 2022-06-28 RX ORDER — MELOXICAM 15 MG/1
TABLET ORAL
Qty: 90 TABLET | Refills: 0 | Status: SHIPPED | OUTPATIENT
Start: 2022-06-28 | End: 2022-09-27

## 2022-09-02 ENCOUNTER — TELEPHONE (OUTPATIENT)
Dept: GASTROENTEROLOGY | Facility: CLINIC | Age: 47
End: 2022-09-02

## 2022-09-02 NOTE — TELEPHONE ENCOUNTER
Screening Questions    BlueKIND OF PREP RedLOCATION [review exclusion criteria] GreenSEDATION TYPE      1. Are you active on mychart? y    2. What insurance is in the chart? [facility]     3.   Ordering/Referring Provider: Sonny Warner MD       4. BMI   (If greater than 40 review exclusion criteria [PAC APPT IF [MAC] @ [facility])  29.9  [If yes, BMI OVER 40-EXTENDED PREP]      **(Sedation review/consideration needed)**  Do you have a legal guardian or Medical Power of    and/or are you able to give consent for your medical care?     Can give consent    5. Have you had a positive covid test in the last 90 days?   n -     6.  Are you currently on dialysis?   n [ If yes, G-PREP & HOSPITAL setting ONLY]     7.  Do you have chronic kidney disease?  n [ If yes, G-PREP ]    8.   Do you have a diagnosis of diabetes?   n   [ If yes, G-PREP ]    9.  On a regular basis do you go 3-5 days between bowel movements?   n   [ If yes, EXTENDED PREP]    10.  Are you taking any prescription pain medications on a routine schedule?    n -  [ If yes, EXTENDED PREP] [If yes, MAC]      11.   Do you have any chemical dependencies such as alcohol, street drugs, or methadone?    n [If yes, MAC]    12.   Do you have any history of post-traumatic stress syndrome, severe anxiety or history of psychosis?    n  [If yes, MAC]    13.  [FEMALES] Are you currently pregnant? n    If yes, how many weeks?       Respiratory/Heart Screening:  [If yes to any of the following HOSPITAL setting only]     14. Do you have Pulmonary Hypertension [Lungs]?   n       15. Do you have UNCONTROLLED asthma?   n     16.  Do you use daily home oxygen?  n      17. Do you have mod to severe Obstructive Sleep Apnea?         (OKAY @ [facility]  [facility]  SH  PH  [facility]  MG - if pt is not on OXYGEN)  n      18.   Have you had a heart or lung transplant?   n      19.   Have you had a stroke or Transient ischemic attack (TIA - aka  mini stroke ) within 6 months?  (If yes,  please review exclusion criteria)  n     20.   In the past 6 months, have you had any heart related issues including cardiomyopathy or heart attack?   n           If yes, did it require cardiac stenting or other implantable device?         21.   Do you have any implantable devices in your body (pacemaker, defib, LVAD)? (If yes, please review exclusion criteria)  n   22.  Do you take the medication Phentermine?     Yes-> Hold for 7 days before procedure.  Please consult your prescribing provider if you have questions about holding this medication.     No-> Continue to next question.    23. Do you take nitroglycerin?   n           If yes, how often?   (if yes, HOSPITAL setting ONLY)    24.  Are you currently taking any blood thinners?    [If yes, INFORM patient to follw up w/ ORDERING PROVIDER FOR BRIDGING INSTRUCTIONS]     n    25.   Do you transfer independently?                (If NO, please HOSPITAL setting ONLY)  y    26.   Preferred LOCAL Pharmacy for Pre Prescription:         [facility] [place] [address] [facility]    Scheduling Details  (Please ask for phone number if not scheduled by patient)      Caller : Amanda Pittman    Date of Procedure: 11/2  Surgeon: Nav  Location:         Sedation Type: MODERATE l   Conscious Sedation- Needs  for 6 hours after the procedure  MAC/General-Needs  for 24 hours after procedure    n :[Pre-op Required] at [facility]  SH  MG and OR for MAC sedation   (advise patient they will need a pre-op WITH IN 30 DAYS of procedure date)     Type of Procedure Scheduled:   Lower Endoscopy [Colonoscopy]    Which Colonoscopy Prep was Sent?:   Gprep - mag citrate recall      [facility] PATIENTS & [facility] PATIENTS NEEDS EXTENDED PREP       Informed patient they will need an adult  y  Cannot take any type of public or medical transportation alone    Pre-Procedure Covid test to be completed at [facility] or Externally: y w/ER Lab  **INFORMED OF HOME  TESTING & LAB OPTION**        Confirmed Nurse will call to complete assessment y    Additional comments:

## 2022-09-07 DIAGNOSIS — Z20.822 SUSPECTED 2019 NOVEL CORONAVIRUS INFECTION: Primary | ICD-10-CM

## 2022-09-12 ENCOUNTER — OFFICE VISIT (OUTPATIENT)
Dept: URGENT CARE | Facility: URGENT CARE | Age: 47
End: 2022-09-12
Payer: COMMERCIAL

## 2022-09-12 VITALS
DIASTOLIC BLOOD PRESSURE: 74 MMHG | HEART RATE: 64 BPM | TEMPERATURE: 98.2 F | SYSTOLIC BLOOD PRESSURE: 120 MMHG | OXYGEN SATURATION: 99 % | WEIGHT: 180 LBS | BODY MASS INDEX: 29.1 KG/M2

## 2022-09-12 DIAGNOSIS — L30.9 DERMATITIS: Primary | ICD-10-CM

## 2022-09-12 PROCEDURE — 99213 OFFICE O/P EST LOW 20 MIN: CPT | Performed by: PHYSICIAN ASSISTANT

## 2022-09-12 RX ORDER — PREDNISONE 20 MG/1
40 TABLET ORAL DAILY
Qty: 10 TABLET | Refills: 0 | Status: SHIPPED | OUTPATIENT
Start: 2022-09-12 | End: 2022-09-17

## 2022-09-12 RX ORDER — TRIAMCINOLONE ACETONIDE 1 MG/G
CREAM TOPICAL
Qty: 80 G | Refills: 0 | Status: SHIPPED | OUTPATIENT
Start: 2022-09-12 | End: 2023-05-31

## 2022-09-13 NOTE — PROGRESS NOTES
Assessment & Plan     Dermatitis  Will treat with predniSONE (DELTASONE) 20 MG tablet; Take 2 tablets (40 mg) by mouth daily for 5 days and triamcinolone (KENALOG) 0.1 % external cream; Apply sparingly to affected area three times daily for 7-14 days.                 Return in about 1 week (around 9/19/2022), or if symptoms worsen or fail to improve.    ENDER [facility] URGENT [facility]                Subjective   Chief Complaint   Patient presents with     Derm Problem     Has had a rash for 9 days on both feet, ankles and lower legs.  Has used cortisone creme, benadryl spray, oral benadryl, calamine lotion, these things are helping but it's not going away.  Seems to be spreading.       HPI     Derm problem     Onset of symptoms was 9 day(s) ago.  Course of illness is same.    Severity moderate  Current and Associated symptoms:   Treatment measures tried include None tried.  Predisposing factors include None.                Review of Systems   Constitutional, HEENT, cardiovascular, pulmonary, gi and gu systems are negative, except as otherwise noted.      Objective    /74   Pulse 64   Temp 98.2  F (36.8  C) (Tympanic)   Wt 81.6 kg (180 lb)   SpO2 99%   BMI 29.10 kg/m    Body mass index is 29.1 kg/m .  Physical Exam  Constitutional:       General: She is not in acute distress.  Skin:     Comments: Dermatitis type rash on both lower legs. No signs of infection.      Neurological:      Mental Status: She is alert.

## 2022-09-14 ENCOUNTER — MYC MEDICAL ADVICE (OUTPATIENT)
Dept: FAMILY MEDICINE | Facility: OTHER | Age: 47
End: 2022-09-14

## 2022-09-14 DIAGNOSIS — E03.9 ACQUIRED HYPOTHYROIDISM: Primary | ICD-10-CM

## 2022-09-14 NOTE — TELEPHONE ENCOUNTER
She is having labs done next Tuesday and she is asking if she should have her Thyroid checked again.    Sarah Larios RN  [facility] ~ Registered Nurse  Clinic Triage ~ [place] [facility]  September 14, 2022

## 2022-09-19 ENCOUNTER — LAB (OUTPATIENT)
Dept: LAB | Facility: OTHER | Age: 47
End: 2022-09-19
Payer: COMMERCIAL

## 2022-09-19 DIAGNOSIS — E03.9 ACQUIRED HYPOTHYROIDISM: ICD-10-CM

## 2022-09-19 DIAGNOSIS — M54.9 INTRACTABLE BACK PAIN: ICD-10-CM

## 2022-09-19 DIAGNOSIS — Z51.81 ENCOUNTER FOR THERAPEUTIC DRUG MONITORING: ICD-10-CM

## 2022-09-19 LAB
ALBUMIN SERPL-MCNC: 3.6 G/DL (ref 3.4–5)
ALP SERPL-CCNC: 60 U/L (ref 40–150)
ALT SERPL W P-5'-P-CCNC: 30 U/L (ref 0–50)
ANION GAP SERPL CALCULATED.3IONS-SCNC: 3 MMOL/L (ref 3–14)
AST SERPL W P-5'-P-CCNC: 21 U/L (ref 0–45)
BILIRUB SERPL-MCNC: 0.4 MG/DL (ref 0.2–1.3)
BUN SERPL-MCNC: 23 MG/DL (ref 7–30)
CALCIUM SERPL-MCNC: 8.7 MG/DL (ref 8.5–10.1)
CHLORIDE BLD-SCNC: 104 MMOL/L (ref 94–109)
CO2 SERPL-SCNC: 30 MMOL/L (ref 20–32)
CREAT SERPL-MCNC: 0.93 MG/DL (ref 0.52–1.04)
ERYTHROCYTE [DISTWIDTH] IN BLOOD BY AUTOMATED COUNT: 13 % (ref 10–15)
GFR SERPL CREATININE-BSD FRML MDRD: 76 ML/MIN/1.73M2
GLUCOSE BLD-MCNC: 89 MG/DL (ref 70–99)
HCT VFR BLD AUTO: 39.2 % (ref 35–47)
HGB BLD-MCNC: 13.4 G/DL (ref 11.7–15.7)
MCH RBC QN AUTO: 32 PG (ref 26.5–33)
MCHC RBC AUTO-ENTMCNC: 34.2 G/DL (ref 31.5–36.5)
MCV RBC AUTO: 94 FL (ref 78–100)
PLATELET # BLD AUTO: 338 10E3/UL (ref 150–450)
POTASSIUM BLD-SCNC: 4.3 MMOL/L (ref 3.4–5.3)
PROT SERPL-MCNC: 6.6 G/DL (ref 6.8–8.8)
RBC # BLD AUTO: 4.19 10E6/UL (ref 3.8–5.2)
SODIUM SERPL-SCNC: 137 MMOL/L (ref 133–144)
TSH SERPL DL<=0.005 MIU/L-ACNC: 3.19 MU/L (ref 0.4–4)
WBC # BLD AUTO: 12.6 10E3/UL (ref 4–11)

## 2022-09-19 PROCEDURE — 80053 COMPREHEN METABOLIC PANEL: CPT

## 2022-09-19 PROCEDURE — 84443 ASSAY THYROID STIM HORMONE: CPT

## 2022-09-19 PROCEDURE — 85027 COMPLETE CBC AUTOMATED: CPT

## 2022-09-19 PROCEDURE — 36415 COLL VENOUS BLD VENIPUNCTURE: CPT

## 2022-09-23 DIAGNOSIS — M54.9 INTRACTABLE BACK PAIN: ICD-10-CM

## 2022-09-26 NOTE — TELEPHONE ENCOUNTER
"Pending Prescriptions:                       Disp   Refills    meloxicam (MOBIC) 15 MG tablet [Pharmacy M*90 tab*0        Sig: TAKE ONE TABLET BY MOUTH ONCE DAILY AS NEEDED FOR           MODERATE PAIN    Routing refill request to provider for review/approval because:  Labs out of range:      Requested Prescriptions   Pending Prescriptions Disp Refills    meloxicam (MOBIC) 15 MG tablet [Pharmacy Med Name: MELOXICAM 15MG TABS] 90 tablet 0     Sig: TAKE ONE TABLET BY MOUTH ONCE DAILY AS NEEDED FOR MODERATE PAIN        NSAID Medications Failed - 9/23/2022  7:01 AM        Failed - Normal CBC on file in past 12 months     Recent Labs   Lab Test 09/19/22  1624   WBC 12.6*   RBC 4.19   HGB 13.4   HCT 39.2                      Passed - Blood pressure under 140/90 in past 12 months       BP Readings from Last 3 Encounters:   09/12/22 120/74   05/13/22 113/73   12/08/21 110/80                 Passed - Normal ALT on file in past 12 months     Recent Labs   Lab Test 09/19/22  1624   ALT 30               Passed - Normal AST on file in past 12 months       Recent Labs   Lab Test 09/19/22  1624   AST 21             Passed - Recent (12 mo) or future (30 days) visit within the authorizing provider's specialty     Patient has had an office visit with the authorizing provider or a provider within the authorizing providers department within the previous 12 mos or has a future within next 30 days. See \"Patient Info\" tab in inbasket, or \"Choose Columns\" in Meds & Orders section of the refill encounter.              Passed - Patient is age 6-64 years        Passed - Medication is active on med list        Passed - No active pregnancy on record        Passed - Normal serum creatinine on file in past 12 months     Recent Labs   Lab Test 09/19/22  1624   CR 0.93       Ok to refill medication if creatinine is low          Passed - No positive pregnancy test in past 12 months                    "

## 2022-09-27 ENCOUNTER — MYC REFILL (OUTPATIENT)
Dept: FAMILY MEDICINE | Facility: OTHER | Age: 47
End: 2022-09-27

## 2022-09-27 DIAGNOSIS — M54.9 INTRACTABLE BACK PAIN: ICD-10-CM

## 2022-09-27 RX ORDER — MELOXICAM 15 MG/1
TABLET ORAL
Qty: 90 TABLET | Refills: 1 | Status: SHIPPED | OUTPATIENT
Start: 2022-09-27 | End: 2023-03-24

## 2022-09-28 RX ORDER — MELOXICAM 15 MG/1
TABLET ORAL
Qty: 90 TABLET | Refills: 0 | OUTPATIENT
Start: 2022-09-28

## 2022-10-09 ENCOUNTER — HEALTH MAINTENANCE LETTER (OUTPATIENT)
Age: 47
End: 2022-10-09

## 2022-10-27 RX ORDER — BISACODYL 5 MG
TABLET, DELAYED RELEASE (ENTERIC COATED) ORAL
Qty: 4 TABLET | Refills: 0 | Status: SHIPPED | OUTPATIENT
Start: 2022-10-27 | End: 2023-05-31

## 2022-10-31 ENCOUNTER — LAB (OUTPATIENT)
Dept: LAB | Facility: OTHER | Age: 47
End: 2022-10-31
Payer: COMMERCIAL

## 2022-10-31 DIAGNOSIS — Z20.822 SUSPECTED 2019 NOVEL CORONAVIRUS INFECTION: ICD-10-CM

## 2022-10-31 PROCEDURE — U0003 INFECTIOUS AGENT DETECTION BY NUCLEIC ACID (DNA OR RNA); SEVERE ACUTE RESPIRATORY SYNDROME CORONAVIRUS 2 (SARS-COV-2) (CORONAVIRUS DISEASE [COVID-19]), AMPLIFIED PROBE TECHNIQUE, MAKING USE OF HIGH THROUGHPUT TECHNOLOGIES AS DESCRIBED BY CMS-2020-01-R: HCPCS

## 2022-10-31 PROCEDURE — U0005 INFEC AGEN DETEC AMPLI PROBE: HCPCS

## 2022-11-01 ENCOUNTER — ANESTHESIA EVENT (OUTPATIENT)
Dept: GASTROENTEROLOGY | Facility: CLINIC | Age: 47
End: 2022-11-01
Payer: COMMERCIAL

## 2022-11-01 LAB — SARS-COV-2 RNA RESP QL NAA+PROBE: NEGATIVE

## 2022-11-01 ASSESSMENT — LIFESTYLE VARIABLES: TOBACCO_USE: 0

## 2022-11-01 NOTE — H&P
[facility] Anesthesia Pre-op History and Physical    Amanda Pittman MRN# [id number]   Age: 46 year old YOB: 1975      Date of Surgery: 2022 Location [facility]      Date of Exam 2022 Facility (Same day)       Home clinic: [facility]  Primary care provider: Sonny Warner         Chief Complaint and/or Reason for Procedure:   No chief complaint on file.  Colonoscopy.   exam.         Active problem list:     Patient Active Problem List    Diagnosis Date Noted     Intractable back pain 2020     Priority: Medium     Back pain 2020     Priority: Medium     CMC (carpometacarpal joint) sprains, right, initial encounter 2019     Priority: Medium     Status post  delivery 2014     Priority: Medium     Diagnosis updated by automated process. Provider to review and confirm.       History of cone biopsy of cervix complicating pregnancy 05/10/2014     Priority: Medium     S/P LEEP (loop electrosurgical excision procedure) 2012     Priority: Medium     02 pap ASCUS neg. HPV  02 pap-benign cellular changes  03 pap NIL  03 pap NIL  06 pap ASC-H. + HPV  3/16/06 colposcopy- bx = SANDY 3/CIS with gland involvement  06 CKC and endometrial currettings- SANDY 2/3 with negative margins. Negative embx.  06 NIL  10/11/06 NIL  07 NIL, 07 NIL, 12/3/08 NIL, 09 NIL  3/22/11 NIL  12 NIL  14 NIL pap, neg HR HPV. Plan: cotest in 1 yr  19 NIL pap, neg HR HPV. Plan: Cotest in 3 years.  22 NIL Pap, Neg HR HPV, also showing endometrial cells. No LMP recorded.  22 Tele enc. Clinic staff contacted patient. Pt is having periods. Provider states that endo cells are then normal finding.              CARDIOVASCULAR SCREENING; LDL GOAL LESS THAN 160 10/31/2010     Priority: Medium     Female infertility 2009     Priority: Medium     Record updated by IMO  load, please review for accuracy.       Infertility management 05/24/2009     Priority: Medium     Hypothyroidism 09/12/2007     Priority: Medium     Problem list name updated by automated process. Provider to review              Medications (include herbals and vitamins):   Any Plavix use in the last 7 days? No     No current facility-administered medications for this encounter.     Current Outpatient Medications   Medication Sig     amitriptyline (ELAVIL) 25 MG tablet TAKE ONE-HALF TABLET TO 1 TABLET (12.5-25 MG) BY MOUTH AT BEDTIME     bisacodyl (DULCOLAX) 5 MG EC tablet Take 2 tablets at 3 pm the day before your procedure. If your procedure is before 11 am, take 2 additional tablets at 11 pm. If your procedure is after 11 am, take 2 additional tablets at 6 am. For additional instructions refer to your colonoscopy prep instructions.     polyethylene glycol (GOLYTELY) 236 g suspension The night before the exam at 6 pm drink an 8-ounce glass every 15 minutes until the jug is half empty. If you arrive before 11 AM: Drink the other half of the Golytely jug at 11 PM night before procedure. If you arrive after 11 AM: Drink the other half of the Golytely jug at 6 AM day of procedure. For additional instructions refer to your colonoscopy prep instructions.     acetaminophen (TYLENOL) 325 MG tablet Take 2 tablets (650 mg) by mouth every 4 hours as needed for mild pain     aspirin-acetaminophen-caffeine (EXCEDRIN MIGRAINE) 250-250-65 MG tablet Take 1 tablet by mouth every 6 hours as needed for headaches     cyclobenzaprine (FLEXERIL) 10 MG tablet Take 1 tablet (10 mg) by mouth 3 times daily as needed for muscle spasms     levothyroxine (SYNTHROID/LEVOTHROID) 100 MCG tablet Take 1 tablet (100 mcg) by mouth daily     meloxicam (MOBIC) 15 MG tablet TAKE ONE TABLET BY MOUTH ONCE DAILY AS NEEDED FOR MODERATE PAIN     SUMAtriptan (IMITREX) 50 MG tablet Take 1 tablet (50 mg) by mouth at onset of headache for migraine      triamcinolone (KENALOG) 0.1 % external cream Apply sparingly to affected area three times daily for 7-14 days.             Allergies:      Allergies   Allergen Reactions     [place] Other (See Comments)     Sores in mouth     Percocet [Oxycodone-Acetaminophen] Other (See Comments)     Tongue swelling and hives     Allergy to Latex? No  Allergy to tape?   No  Intolerances:             Physical Exam:   All vitals have been reviewed  No data found.  No intake/output data recorded.  Lungs:   No increased work of breathing, good air exchange, clear to auscultation bilaterally, no crackles or wheezing     Cardiovascular:   Normal apical impulse, regular rate and rhythm, normal S1 and S2, no S3 or S4, and no murmur noted             Lab / Radiology Results:            Anesthetic risk and/or ASA classification:       Erick Chopra MD

## 2022-11-01 NOTE — ANESTHESIA PREPROCEDURE EVALUATION
Anesthesia Pre-Procedure Evaluation    Patient: Amanda Pittman   MRN: [id number] : 1975        Procedure : Procedure(s):  COLONOSCOPY          Past Medical History:   Diagnosis Date     Cancer (H)     Cervical     Screening for malignant neoplasm of the cervix     took most of cervix out. SANDY 2/3, CIS*     Thyroid disease       Past Surgical History:   Procedure Laterality Date     BIOPSY  2006    Colposcopy      SECTION N/A 2014    Procedure:  SECTION;  Surgeon: Max Merida MD;  Location: [facility] L+D     COLONOSCOPY       HC COLP CERVIX/UPPER VAGINA W BX CERVIX/ENDOCERV CURETT  2006    SANDY 2/3, CIS      HC REMOVE TONSILS/ADENOIDS,12+ Y/O      Tonsils 11+y.o. tonsilectomy     LEEP TX, CERVICAL  2006     TONSILLECTOMY  08    Right - sided     ZZC  DELIVERY ONLY  2003    , Low Cervical     ZZHC MARSUP BARTHOLIN GLAND CYST      x4      Allergies   Allergen Reactions     [place] Other (See Comments)     Sores in mouth     Percocet [Oxycodone-Acetaminophen] Other (See Comments)     Tongue swelling and hives      Social History     Tobacco Use     Smoking status: Never     Smokeless tobacco: Never     Tobacco comments:     used to smoke occ   Substance Use Topics     Alcohol use: No      Wt Readings from Last 1 Encounters:   22 81.6 kg (180 lb)        Anesthesia Evaluation   Pt has had prior anesthetic. Type: General, MAC and Regional.    No history of anesthetic complications       ROS/MED HX  ENT/Pulmonary:    (-) tobacco use and asthma   Neurologic:  - neg neurologic ROS     Cardiovascular:  - neg cardiovascular ROS     METS/Exercise Tolerance:     Hematologic:  - neg hematologic  ROS     Musculoskeletal:  - neg musculoskeletal ROS     GI/Hepatic:  - neg GI/hepatic ROS     Renal/Genitourinary:  - neg Renal ROS     Endo:     (+) thyroid problem, hypothyroidism,     Psychiatric/Substance Use:  - neg psychiatric ROS     Infectious  Disease:  - neg infectious disease ROS     Malignancy:  - neg malignancy ROS     Other:  - neg other ROS          Physical Exam    Airway  airway exam normal      Mallampati: II   TM distance: > 3 FB   Neck ROM: full   Mouth opening: > 3 cm    Respiratory Devices and Support         Dental  no notable dental history         Cardiovascular   cardiovascular exam normal       Rhythm and rate: regular and normal     Pulmonary   pulmonary exam normal        breath sounds clear to auscultation           OUTSIDE LABS:  CBC:   Lab Results   Component Value Date    WBC 12.6 (H) 09/19/2022    WBC 14.5 (H) 09/17/2021    HGB 13.4 09/19/2022    HGB 14.6 09/17/2021    HCT 39.2 09/19/2022    HCT 42.0 09/17/2021     09/19/2022     09/17/2021     BMP:   Lab Results   Component Value Date     09/19/2022     09/17/2021    POTASSIUM 4.3 09/19/2022    POTASSIUM 4.0 09/17/2021    CHLORIDE 104 09/19/2022    CHLORIDE 109 09/17/2021    CO2 30 09/19/2022    CO2 27 09/17/2021    BUN 23 09/19/2022    BUN 13 09/17/2021    CR 0.93 09/19/2022    CR 0.95 09/17/2021    GLC 89 09/19/2022     (H) 09/17/2021     COAGS: No results found for: PTT, INR, FIBR  POC:   Lab Results   Component Value Date    HCG Positive (A) 04/01/2014     HEPATIC:   Lab Results   Component Value Date    ALBUMIN 3.6 09/19/2022    PROTTOTAL 6.6 (L) 09/19/2022    ALT 30 09/19/2022    AST 21 09/19/2022    ALKPHOS 60 09/19/2022    BILITOTAL 0.4 09/19/2022     OTHER:   Lab Results   Component Value Date    PH 6.0 04/25/2003    MARNIE 8.7 09/19/2022    TSH 3.19 09/19/2022    T4 1.05 10/14/2014    SED 8 10/27/2007       Anesthesia Plan    ASA Status:  2   NPO Status:  NPO Appropriate    Anesthesia Type: MAC.     - Reason for MAC: straight local not clinically adequate   Induction: Intravenous, Propofol.   Maintenance: TIVA.        Consents    Anesthesia Plan(s) and associated risks, benefits, and realistic alternatives discussed. Questions answered and  patient/representative(s) expressed understanding.    - Discussed:     - Discussed with:  Patient      - Extended Intubation/Ventilatory Support Discussed: No.      - Patient is DNR/DNI Status: No    Use of blood products discussed: No .     Postoperative Care    Pain management: IV analgesics.        Comments:    Other Comments: The risks and benefits of anesthesia, and the alternatives where applicable, have been discussed with the patient, and they wish to proceed.            CHARLIE De Jesus CRNA

## 2022-11-02 ENCOUNTER — HOSPITAL ENCOUNTER (OUTPATIENT)
Facility: CLINIC | Age: 47
Discharge: HOME | End: 2022-11-02
Attending: INTERNAL MEDICINE | Admitting: INTERNAL MEDICINE
Payer: COMMERCIAL

## 2022-11-02 ENCOUNTER — ANESTHESIA (OUTPATIENT)
Dept: GASTROENTEROLOGY | Facility: CLINIC | Age: 47
End: 2022-11-02
Payer: COMMERCIAL

## 2022-11-02 VITALS
OXYGEN SATURATION: 100 % | DIASTOLIC BLOOD PRESSURE: 70 MMHG | HEART RATE: 78 BPM | RESPIRATION RATE: 16 BRPM | TEMPERATURE: 97.8 F | SYSTOLIC BLOOD PRESSURE: 99 MMHG

## 2022-11-02 DIAGNOSIS — Z12.11 SPECIAL SCREENING FOR MALIGNANT NEOPLASMS, COLON: Primary | ICD-10-CM

## 2022-11-02 LAB — COLONOSCOPY: NORMAL

## 2022-11-02 PROCEDURE — 250N000009 HC RX 250: Performed by: NURSE ANESTHETIST, CERTIFIED REGISTERED

## 2022-11-02 PROCEDURE — 45378 DIAGNOSTIC COLONOSCOPY: CPT | Performed by: INTERNAL MEDICINE

## 2022-11-02 PROCEDURE — 258N000003 HC RX IP 258 OP 636: Performed by: NURSE ANESTHETIST, CERTIFIED REGISTERED

## 2022-11-02 PROCEDURE — 250N000011 HC RX IP 250 OP 636: Performed by: NURSE ANESTHETIST, CERTIFIED REGISTERED

## 2022-11-02 PROCEDURE — 370N000017 HC ANESTHESIA TECHNICAL FEE, PER MIN: Performed by: INTERNAL MEDICINE

## 2022-11-02 PROCEDURE — G0121 COLON CA SCRN NOT HI RSK IND: HCPCS | Performed by: INTERNAL MEDICINE

## 2022-11-02 RX ORDER — PROPOFOL 10 MG/ML
INJECTION, EMULSION INTRAVENOUS PRN
Status: DISCONTINUED | OUTPATIENT
Start: 2022-11-02 | End: 2022-11-02

## 2022-11-02 RX ORDER — LIDOCAINE HYDROCHLORIDE 20 MG/ML
INJECTION, SOLUTION INFILTRATION; PERINEURAL PRN
Status: DISCONTINUED | OUTPATIENT
Start: 2022-11-02 | End: 2022-11-02

## 2022-11-02 RX ORDER — SODIUM CHLORIDE, SODIUM LACTATE, POTASSIUM CHLORIDE, CALCIUM CHLORIDE 600; 310; 30; 20 MG/100ML; MG/100ML; MG/100ML; MG/100ML
INJECTION, SOLUTION INTRAVENOUS CONTINUOUS
Status: DISCONTINUED | OUTPATIENT
Start: 2022-11-02 | End: 2022-11-02 | Stop reason: HOSPADM

## 2022-11-02 RX ORDER — LIDOCAINE 40 MG/G
CREAM TOPICAL
Status: DISCONTINUED | OUTPATIENT
Start: 2022-11-02 | End: 2022-11-02 | Stop reason: HOSPADM

## 2022-11-02 RX ORDER — PROPOFOL 10 MG/ML
INJECTION, EMULSION INTRAVENOUS CONTINUOUS PRN
Status: DISCONTINUED | OUTPATIENT
Start: 2022-11-02 | End: 2022-11-02

## 2022-11-02 RX ADMIN — SODIUM CHLORIDE, POTASSIUM CHLORIDE, SODIUM LACTATE AND CALCIUM CHLORIDE: 600; 310; 30; 20 INJECTION, SOLUTION INTRAVENOUS at 07:32

## 2022-11-02 RX ADMIN — LIDOCAINE HYDROCHLORIDE 1 ML: 10 INJECTION, SOLUTION EPIDURAL; INFILTRATION; INTRACAUDAL; PERINEURAL at 07:32

## 2022-11-02 RX ADMIN — LIDOCAINE HYDROCHLORIDE 60 MG: 20 INJECTION, SOLUTION INFILTRATION; PERINEURAL at 07:48

## 2022-11-02 RX ADMIN — PROPOFOL 150 MCG/KG/MIN: 10 INJECTION, EMULSION INTRAVENOUS at 07:48

## 2022-11-02 RX ADMIN — PROPOFOL 100 MG: 10 INJECTION, EMULSION INTRAVENOUS at 07:48

## 2022-11-02 ASSESSMENT — ACTIVITIES OF DAILY LIVING (ADL): ADLS_ACUITY_SCORE: 37

## 2022-11-02 NOTE — ANESTHESIA CARE TRANSFER NOTE
Patient: Amanda Pittman    Procedure: Procedure(s):  COLONOSCOPY       Diagnosis: Screen for colon cancer [Z12.11]  Diagnosis Additional Information: No value filed.    Anesthesia Type:   MAC     Note:    Oropharynx: spontaneously breathing  Level of Consciousness: awake  Oxygen Supplementation: room air    Independent Airway: airway patency satisfactory and stable  Dentition: dentition unchanged  Vital Signs Stable: post-procedure vital signs reviewed and stable  Report to RN Given: handoff report given  Patient transferred to: Phase II    Handoff Report: Identifed the Patient, Identified the Reponsible Provider, Reviewed the pertinent medical history, Discussed the surgical course, Reviewed Intra-OP anesthesia mangement and issues during anesthesia, Set expectations for post-procedure period and Allowed opportunity for questions and acknowledgement of understanding      Vitals:  Vitals Value Taken Time   /83 11/02/22 0830   Temp     Pulse 78 11/02/22 0830   Resp     SpO2 100 % 11/02/22 0830   Vitals shown include unvalidated device data.    Electronically Signed By: CHARLIE De Jesus CRNA  November 2, 2022  8:33 AM

## 2022-11-02 NOTE — ANESTHESIA POSTPROCEDURE EVALUATION
Patient: Amanda Pittman    Procedure: Procedure(s):  COLONOSCOPY       Anesthesia Type:  MAC    Note:  Disposition: Outpatient   Postop Pain Control: Uneventful            Sign Out: Well controlled pain   PONV: No   Neuro/Psych: Uneventful            Sign Out: Acceptable/Baseline neuro status   Airway/Respiratory: Uneventful            Sign Out: Acceptable/Baseline resp. status   CV/Hemodynamics: Uneventful            Sign Out: Acceptable CV status   Other NRE: NONE   DID A NON-ROUTINE EVENT OCCUR? No    Event details/Postop Comments:  Pt was happy with anesthesia care.  No complications.  I will follow up with the pt if needed.           Last vitals:  Vitals Value Taken Time   /83 11/02/22 0830   Temp     Pulse 78 11/02/22 0830   Resp     SpO2 100 % 11/02/22 0830   Vitals shown include unvalidated device data.    Electronically Signed By: CHARLIE De Jesus CRNA  November 2, 2022  8:34 AM

## 2022-11-02 NOTE — DISCHARGE INSTRUCTIONS
[facility]    Home Care Following Endoscopy          Activity:  You have just undergone an endoscopic procedure usually performed with conscious sedation.  Do not work or operate machinery (including a car) for at least 12 hours.    I encourage you to walk and attempt to pass this air as soon as possible.    Diet:  Return to the diet you were on before your procedure but eat lightly for the first 12-24 hours.  Drink plenty of water.  Resume any regular medications unless otherwise advised by your physician.  Please begin any new medication prescribed as a result of your procedure as directed by your physician.   Pain:  You may take Tylenol as needed for pain.  Expected Recovery:  You can expect some mild abdominal fullness and/or discomfort due to the air used to inflate your intestinal tract.     Call Your Physician if You Have:  After Colonoscopy:  Worsening persisting abdominal pain which is worse with activity.  Fevers (>101 degrees F), chills or shakes.  Passage of continued blood with bowel movements.     Any questions or concerns about your recovery, please call [phone] or after hours [phone] Nurse Advice Line.    If asked to return to clinic please make an appointment 1 week after your procedure.  Call [phone].

## 2022-11-03 DIAGNOSIS — E03.9 ACQUIRED HYPOTHYROIDISM: ICD-10-CM

## 2022-11-07 RX ORDER — LEVOTHYROXINE SODIUM 100 UG/1
TABLET ORAL
Qty: 90 TABLET | Refills: 3 | Status: SHIPPED | OUTPATIENT
Start: 2022-11-07 | End: 2023-05-31

## 2022-11-08 ENCOUNTER — HOSPITAL ENCOUNTER (OUTPATIENT)
Dept: MAMMOGRAPHY | Facility: CLINIC | Age: 47
Discharge: HOME | End: 2022-11-08
Attending: FAMILY MEDICINE | Admitting: FAMILY MEDICINE
Payer: COMMERCIAL

## 2022-11-08 DIAGNOSIS — Z12.31 VISIT FOR SCREENING MAMMOGRAM: ICD-10-CM

## 2022-11-08 PROCEDURE — 77067 SCR MAMMO BI INCL CAD: CPT

## 2023-03-22 DIAGNOSIS — G43.119 INTRACTABLE MIGRAINE WITH AURA WITHOUT STATUS MIGRAINOSUS: ICD-10-CM

## 2023-03-22 DIAGNOSIS — M54.9 INTRACTABLE BACK PAIN: ICD-10-CM

## 2023-03-23 NOTE — TELEPHONE ENCOUNTER
Pending Prescriptions:                       Disp   Refills    meloxicam (MOBIC) 15 MG tablet [Pharmacy M*90 tab*1        Sig: TAKE ONE TABLET BY MOUTH ONCE DAILY AS NEEDED FOR           MODERATE PAIN    Signed Prescriptions:                        Disp   Refills    amitriptyline (ELAVIL) 25 MG tablet        90 tab*0        Sig: TAKE ONE-HALF TO 1 TABLET (12.5-25 MG) BY MOUTH AT           BEDTIME  Authorizing Provider: JOSEPH GIRARD  Ordering User: AVNI REDDY    Routing refill request to provider for review/approval because:  Labs out of range:  CBC RESULTS:   Recent Labs   Lab Test 09/19/22  1624   WBC 12.6*   RBC 4.19   HGB 13.4   HCT 39.2   MCV 94   MCH 32.0   MCHC 34.2   RDW 13.0

## 2023-03-23 NOTE — TELEPHONE ENCOUNTER
Pending Prescriptions:                       Disp   Refills    amitriptyline (ELAVIL) 25 MG tablet [Phar*90 tab*0            Sig: TAKE ONE-HALF TO 1 TABLET (12.5-25 MG) BY MOUTH           AT BEDTIME    meloxicam (MOBIC) 15 MG tablet [Pharmacy *90 tab*1            Sig: TAKE ONE TABLET BY MOUTH ONCE DAILY AS NEEDED FOR           MODERATE PAIN    Medication is being filled for 1 time abdoulaye refill only due to:  Patient is due for routine visit    Please call and help schedule.  Thank you!

## 2023-03-24 RX ORDER — MELOXICAM 15 MG/1
TABLET ORAL
Qty: 90 TABLET | Refills: 0 | Status: SHIPPED | OUTPATIENT
Start: 2023-03-24 | End: 2023-05-31

## 2023-03-24 NOTE — TELEPHONE ENCOUNTER
Your medication has been refilled.  Please schedule a visit at next available that is convenient for patient (virtual plus lab OK but not preferred) to follow up on how this medication is working for you before your next refill.  We need to be sure everything is working well and stable prior to further refills.

## 2023-03-27 ENCOUNTER — MYC MEDICAL ADVICE (OUTPATIENT)
Dept: FAMILY MEDICINE | Facility: OTHER | Age: 48
End: 2023-03-27
Payer: COMMERCIAL

## 2023-05-30 ASSESSMENT — ENCOUNTER SYMPTOMS
BREAST MASS: 0
CONSTIPATION: 0
DIZZINESS: 0
WEAKNESS: 0
FREQUENCY: 0
COUGH: 0
MYALGIAS: 0
SORE THROAT: 0
JOINT SWELLING: 0
SHORTNESS OF BREATH: 0
DIARRHEA: 0
HEMATURIA: 0
ABDOMINAL PAIN: 0
HEARTBURN: 0
FEVER: 0
NAUSEA: 0
EYE PAIN: 0
ARTHRALGIAS: 0
PALPITATIONS: 0
DYSURIA: 0
HEADACHES: 0
CHILLS: 0
NERVOUS/ANXIOUS: 0
PARESTHESIAS: 0
HEMATOCHEZIA: 0

## 2023-05-31 ENCOUNTER — OFFICE VISIT (OUTPATIENT)
Dept: FAMILY MEDICINE | Facility: OTHER | Age: 48
End: 2023-05-31
Payer: COMMERCIAL

## 2023-05-31 VITALS
SYSTOLIC BLOOD PRESSURE: 102 MMHG | OXYGEN SATURATION: 99 % | RESPIRATION RATE: 16 BRPM | DIASTOLIC BLOOD PRESSURE: 68 MMHG | WEIGHT: 187.5 LBS | HEART RATE: 73 BPM | HEIGHT: 66 IN | TEMPERATURE: 98.2 F | BODY MASS INDEX: 30.13 KG/M2

## 2023-05-31 DIAGNOSIS — F39 EPISODIC MOOD DISORDER (H): ICD-10-CM

## 2023-05-31 DIAGNOSIS — E03.9 ACQUIRED HYPOTHYROIDISM: ICD-10-CM

## 2023-05-31 DIAGNOSIS — G43.119 INTRACTABLE MIGRAINE WITH AURA WITHOUT STATUS MIGRAINOSUS: ICD-10-CM

## 2023-05-31 DIAGNOSIS — Z51.81 ENCOUNTER FOR THERAPEUTIC DRUG MONITORING: ICD-10-CM

## 2023-05-31 DIAGNOSIS — Z00.00 ROUTINE GENERAL MEDICAL EXAMINATION AT A HEALTH CARE FACILITY: Primary | ICD-10-CM

## 2023-05-31 DIAGNOSIS — M54.9 INTRACTABLE BACK PAIN: ICD-10-CM

## 2023-05-31 PROCEDURE — 99396 PREV VISIT EST AGE 40-64: CPT | Performed by: FAMILY MEDICINE

## 2023-05-31 PROCEDURE — 99214 OFFICE O/P EST MOD 30 MIN: CPT | Mod: 25 | Performed by: FAMILY MEDICINE

## 2023-05-31 RX ORDER — LEVOTHYROXINE SODIUM 100 UG/1
100 TABLET ORAL DAILY
Qty: 90 TABLET | Refills: 0 | Status: SHIPPED | OUTPATIENT
Start: 2023-05-31 | End: 2023-10-10

## 2023-05-31 RX ORDER — MELOXICAM 15 MG/1
TABLET ORAL
Qty: 90 TABLET | Refills: 0 | Status: SHIPPED | OUTPATIENT
Start: 2023-05-31 | End: 2023-09-27

## 2023-05-31 ASSESSMENT — ENCOUNTER SYMPTOMS
HEARTBURN: 0
ABDOMINAL PAIN: 0
HEADACHES: 0
BREAST MASS: 0
NERVOUS/ANXIOUS: 0
ARTHRALGIAS: 0
DYSURIA: 0
CONSTIPATION: 0
DIZZINESS: 0
PALPITATIONS: 0
SHORTNESS OF BREATH: 0
JOINT SWELLING: 0
CHILLS: 0
WEAKNESS: 0
NAUSEA: 0
PARESTHESIAS: 0
EYE PAIN: 0
HEMATOCHEZIA: 0
FEVER: 0
COUGH: 0
FREQUENCY: 0
SORE THROAT: 0
HEMATURIA: 0
DIARRHEA: 0
MYALGIAS: 0

## 2023-05-31 ASSESSMENT — PAIN SCALES - GENERAL: PAINLEVEL: NO PAIN (0)

## 2023-05-31 NOTE — PROGRESS NOTES
SUBJECTIVE:   CC: Amanda is an 47 year old who presents for preventive health visit.       5/31/2023     7:03 AM   Additional Questions   Roomed by steffanie   Accompanied by alone         5/31/2023     7:03 AM   Patient Reported Additional Medications   Patient reports taking the following new medications none     Patient has been advised of split billing requirements and indicates understanding: Yes  Healthy Habits:     Getting at least 3 servings of Calcium per day:  Yes    Bi-annual eye exam:  Yes    Dental care twice a year:  Yes    Sleep apnea or symptoms of sleep apnea:  None    Diet:  Regular (no restrictions)    Frequency of exercise:  2-3 days/week    Duration of exercise:  15-30 minutes    Taking medications regularly:  Yes    Medication side effects:  None    PHQ-2 Total Score: 0    Additional concerns today:  No    Back pain/sacrum area describes as tender and hard to sit on. Has a history of injury where she fell off a horse and healed with possible arthritis. Patient reports that lidocaine, heat and Meloxicam helps keep pain at 2/10. Patient was with out Meloxicam for several days and noticed that the pain increased to 5/10. Denies dark or tarry stools, swelling or gi upset. Uses flexeril as needed.     Migraines: Several times a year, reports aura, visual disturbances and pain in the frontal lobe.  Typical headache lasts about 1-2 days. Currently being managed with daily amitriptyline. Excedrin migraine, managing break through pain.     Changes in mood: Patient  reports as an increase in anger and frustration that occurs a couple days prior to her menses, and lasts several days. Exercise and isolation is helpful.       Today's PHQ-2 Score:       5/30/2023    12:44 PM   PHQ-2 ( 1999 [employer])   Q1: Little interest or pleasure in doing things 0   Q2: Feeling down, depressed or hopeless 0   PHQ-2 Score 0   Q1: Little interest or pleasure in doing things Not at all   Q2: Feeling down, depressed or hopeless  Not at all   PHQ-2 Score 0           Social History     Tobacco Use     Smoking status: Never     Smokeless tobacco: Never     Tobacco comments:     used to smoke occ   Vaping Use     Vaping status: Never Used   Substance Use Topics     Alcohol use: No             2023    12:44 PM   Alcohol Use   Prescreen: >3 drinks/day or >7 drinks/week? No     Reviewed orders with patient.  Reviewed health maintenance and updated orders accordingly - Yes  BP Readings from Last 3 Encounters:   23 102/68   22 99/70   22 120/74    Wt Readings from Last 3 Encounters:   23 85 kg (187 lb 8 oz)   22 81.6 kg (180 lb)   22 97.1 kg (214 lb)                  Patient Active Problem List   Diagnosis     Hypothyroidism     Infertility management     Female infertility     CARDIOVASCULAR SCREENING; LDL GOAL LESS THAN 160     S/P LEEP (loop electrosurgical excision procedure)     History of cone biopsy of cervix complicating pregnancy     Status post  delivery     CMC (carpometacarpal joint) sprains, right, initial encounter     Intractable back pain     Back pain     Past Surgical History:   Procedure Laterality Date     BIOPSY      Colposcopy      SECTION N/A 2014    Procedure:  SECTION;  Surgeon: Max Merida MD;  Location: [facility]     COLONOSCOPY       COLONOSCOPY N/A 2022    Procedure: COLONOSCOPY;  Surgeon: Erick Chopra MD;  Location: [facility]     HC COLP CERVIX/UPPER VAGINA W BX CERVIX/ENDOCERV CURETT  2006    SANDY 2/3, CIS      HC REMOVE TONSILS/ADENOIDS,11+ Y/O      Tonsils 11+y.o. tonsilectomy     LEEP TX, CERVICAL       TONSILLECTOMY  08    Right - sided     ZZC  DELIVERY ONLY  2003    , Low Cervical     ZZHC MARSUP BARTHOLIN GLAND CYST      x4       Social History     Tobacco Use     Smoking status: Never     Smokeless tobacco: Never     Tobacco comments:     used to smoke occ   Vaping Use     Vaping status:  Never Used   Substance Use Topics     Alcohol use: No     Family History   Problem Relation Age of Onset     Cancer Maternal Grandmother         uterus cancer     Heart Disease Paternal Grandfather          of MI     Heart Disease Maternal Grandfather      Cancer Mother 72        myeloma     Other Cancer Mother         Multiple myeloma 2015     Heart Disease Father          Current Outpatient Medications   Medication Sig Dispense Refill     amitriptyline (ELAVIL) 25 MG tablet TAKE 1 TABLET BY MOUTH AT BEDTIME 90 tablet 0     aspirin-acetaminophen-caffeine (EXCEDRIN MIGRAINE) 250-250-65 MG tablet Take 1 tablet by mouth every 6 hours as needed for headaches       cyclobenzaprine (FLEXERIL) 10 MG tablet Take 1 tablet (10 mg) by mouth 3 times daily as needed for muscle spasms 90 tablet 1     levothyroxine (SYNTHROID/LEVOTHROID) 100 MCG tablet Take 1 tablet (100 mcg) by mouth daily 90 tablet 0     meloxicam (MOBIC) 15 MG tablet TAKE ONE TABLET BY MOUTH ONCE DAILY AS NEEDED FOR MODERATE PAIN 90 tablet 0     Allergies   Allergen Reactions     Strawberry Extract Other (See Comments)     Sores in mouth     Percocet [Oxycodone-Acetaminophen] Other (See Comments)     Tongue swelling and hives     Recent Labs   Lab Test 22  1624 21  1655 20  1513 20  0558 17  1416 11/19/15  1528   LDL  --  77  --   --   --  71   HDL  --  58  --   --   --  48*   TRIG  --  64  --   --   --  178*   ALT 30  --   --   --   --  17   CR 0.93 0.95  --  0.95  --  0.75   GFRESTIMATED 76 72  --  72  --  85   GFRESTBLACK  --   --   --  84  --  >90   GFR Calc     POTASSIUM 4.3 4.0  --  4.3  --  4.5   TSH 3.19 1.43   < >  --    < > 3.60    < > = values in this interval not displayed.        Breast Cancer Screenin/13/2022     7:14 AM   Breast CA Risk Assessment (FHS-7)   Do you have a family history of breast, colon, or ovarian cancer? No / Unknown         Mammogram Screening: Recommended annual  "mammography  Pertinent mammograms are reviewed under the imaging tab.    History of abnormal Pap smear: NO - age 30-65 PAP every 5 years with negative HPV co-testing recommended      Latest Ref Rng & Units 5/13/2022     7:53 AM 5/9/2019    11:44 AM 5/9/2019    11:43 AM   PAP / HPV   PAP  Negative for Intraepithelial Lesion or Malignancy (NILM)       PAP (Historical)   NIL      HPV 16 DNA Negative Negative    Negative     HPV 18 DNA Negative Negative    Negative     Other HR HPV Negative Negative    Negative       Reviewed and updated as needed this visit by clinical staff   Tobacco  Allergies  Meds              Reviewed and updated as needed this visit by Provider                    Review of Systems   Constitutional: Negative for chills and fever.   HENT: Negative for congestion, ear pain, hearing loss and sore throat.    Eyes: Negative for pain and visual disturbance.   Respiratory: Negative for cough and shortness of breath.    Cardiovascular: Negative for chest pain, palpitations and peripheral edema.   Gastrointestinal: Negative for abdominal pain, constipation, diarrhea, heartburn, hematochezia and nausea.   Breasts:  Negative for tenderness, breast mass and discharge.   Genitourinary: Negative for dysuria, frequency, genital sores, hematuria, pelvic pain, urgency, vaginal bleeding and vaginal discharge.   Musculoskeletal: Negative for arthralgias, joint swelling and myalgias.   Skin: Negative for rash.   Neurological: Negative for dizziness, weakness, headaches and paresthesias.   Psychiatric/Behavioral: Positive for mood changes. The patient is not nervous/anxious.           OBJECTIVE:   /68   Pulse 73   Temp 98.2  F (36.8  C) (Temporal)   Resp 16   Ht 1.671 m (5' 5.79\")   Wt 85 kg (187 lb 8 oz)   LMP 05/28/2023 (Exact Date)   SpO2 99%   BMI 30.46 kg/m    Physical Exam  GENERAL: healthy, alert and no distress  EYES: Eyes grossly normal to inspection, PERRL and conjunctivae and sclerae " normal  HENT: ear canals and TM's normal, nose and mouth without ulcers or lesions  NECK: no adenopathy, no asymmetry, masses, or scars and thyroid normal to palpation  RESP: lungs clear to auscultation - no rales, rhonchi or wheezes  CV: regular rate and rhythm, normal S1 S2, no S3 or S4, no murmur, click or rub, no peripheral edema and peripheral pulses strong  ABDOMEN: soft, nontender, no hepatosplenomegaly, no masses and bowel sounds normal  MS: no gross musculoskeletal defects noted, no edema  SKIN: no suspicious lesions or rashes  NEURO: Normal strength and tone, mentation intact and speech normal  PSYCH: mentation appears normal, affect normal/bright    Diagnostic Test Results:  Labs reviewed in Epic  No results found for this or any previous visit (from the past 24 hour(s)).  No results found for any visits on 05/31/23.    ASSESSMENT/PLAN:       ICD-10-CM    1. Routine general medical examination at a health care facility  Z00.00 CBC with platelets     Comprehensive metabolic panel (BMP + Alb, Alk Phos, ALT, AST, Total. Bili, TP)     TSH with free T4 reflex     Lipid panel reflex to direct LDL Fasting      2. Intractable back pain  M54.9 amitriptyline (ELAVIL) 25 MG tablet     meloxicam (MOBIC) 15 MG tablet      3. Intractable migraine with aura without status migrainosus  G43.119 amitriptyline (ELAVIL) 25 MG tablet      4. Acquired hypothyroidism  E03.9 levothyroxine (SYNTHROID/LEVOTHROID) 100 MCG tablet      5. Encounter for therapeutic drug monitoring  Z51.81 CBC with platelets     Comprehensive metabolic panel (BMP + Alb, Alk Phos, ALT, AST, Total. Bili, TP)     TSH with free T4 reflex      6. Episodic mood disorder (H)  F39         1. Reviewed recommended screenings and ordered appropriate testing for pt's risks and per pt's request(s).   2. Controlled, with daily Meloxicam, discussed possible side effects such as bleeding, swelling and gi upset. Patient agrees to check monitoring labs in the fall.  Medication refilled.   3. Controlled, managed with daily amitriptyline, discussed side effect of sedation, patient will take early pm to decrease morning grogginess.   4. Controlled, will check monitoring labs in the fall, orders placed.   5. Monitoring labs ordered, completed in fall.  6. Discussed etiology, including hormonal or stress related, could trial fluoxitine for prophylactic to take several days prior to menses and continue for 2 weeks, then stop and resume next cycle. Patient declined will work on non pharmacological   interventions such as isolation and exercise.       Physician Attestation   I, Sonny Warner MD, MD, was present with the medical/CEL[employer] student who participated in the service and in the documentation of the note.  I have verified the history and personally performed the physical exam and medical decision making.  I agree with the assessment and plan of care as documented in the note.      Items personally reviewed: vitals, labs and exam and agree with the interpretation documented in the note.    Sonny Warner MD, MD      COUNSELING:  Reviewed preventive health counseling, as reflected in patient instructions       Regular exercise       Healthy diet/nutrition       Immunizations    Declined: Hepatitis B due to Concerns about side effects/safety               Contraception       Osteoporosis prevention/bone health       The 10-year ASCVD risk score (Jannette MARTINEZ, et al., 2019) is: 0.4%    Values used to calculate the score:      Age: 47 years      Sex: Female      Is Non- : No      Diabetic: No      Tobacco smoker: No      Systolic Blood Pressure: 102 mmHg      Is BP treated: No      HDL Cholesterol: 58 mg/dL      Total Cholesterol: 148 mg/dL        She reports that she has never smoked. She has never used smokeless tobacco.          Sonny Warner MD, MD  [facility]

## 2023-05-31 NOTE — PATIENT INSTRUCTIONS
Please get fasting labs this fall.      Let me know if any problems in the meantime.    Contact us or return if questions or concerns.     Have a nice day!    Dr. Warner     Preventive Health Recommendations  Female Ages 40 to 49    Yearly exam:   See your health care provider every year in order to  Review health changes.   Discuss preventive care.    Review your medicines if your doctor prescribed any.    Get a Pap test every three years (unless you have an abnormal result and your provider advises testing more often).    If you get Pap tests with HPV test, you only need to test every 5 years, unless you have an abnormal result. You do not need a Pap test if your uterus was removed (hysterectomy) and you have not had cancer.    You should be tested each year for STDs (sexually transmitted diseases), if you're at risk.   Ask your doctor if you should have a mammogram.    Have a colonoscopy (test for colon cancer) if someone in your family has had colon cancer or polyps before age 50.     Have a cholesterol test every 5 years.     Have a diabetes test (fasting glucose) after age 45. If you are at risk for diabetes, you should have this test every 3 years.    Shots: Get a flu shot each year. Get a tetanus shot every 10 years.     Nutrition:   Eat at least 5 servings of fruits and vegetables each day.  Eat whole-grain bread, whole-wheat pasta and brown rice instead of white grains and rice.  Get adequate Calcium and Vitamin D.      Lifestyle  Exercise at least 150 minutes a week (an average of 30 minutes a day, 5 days a week). This will help you control your weight and prevent disease.  Limit alcohol to one drink per day.  No smoking.   Wear sunscreen to prevent skin cancer.  See your dentist every six months for an exam and cleaning.

## 2023-08-01 ENCOUNTER — DOCUMENTATION ONLY (OUTPATIENT)
Dept: FAMILY MEDICINE | Facility: OTHER | Age: 48
End: 2023-08-01
Payer: COMMERCIAL

## 2023-08-01 NOTE — PROGRESS NOTES
Patient has an appointment for labs. Most of her labs have an expected date of 8/31. We are not allowed to do labs more than 2 weeks before expected date. If you would like all of the labs done please place new orders.

## 2023-08-02 ENCOUNTER — LAB (OUTPATIENT)
Dept: LAB | Facility: OTHER | Age: 48
End: 2023-08-02
Payer: COMMERCIAL

## 2023-08-02 DIAGNOSIS — Z51.81 ENCOUNTER FOR THERAPEUTIC DRUG MONITORING: ICD-10-CM

## 2023-08-02 DIAGNOSIS — Z00.00 ROUTINE GENERAL MEDICAL EXAMINATION AT A HEALTH CARE FACILITY: ICD-10-CM

## 2023-08-02 LAB
ALBUMIN SERPL BCG-MCNC: 4.2 G/DL (ref 3.5–5.2)
ALP SERPL-CCNC: 64 U/L (ref 35–104)
ALT SERPL W P-5'-P-CCNC: 8 U/L (ref 0–50)
ANION GAP SERPL CALCULATED.3IONS-SCNC: 9 MMOL/L (ref 7–15)
AST SERPL W P-5'-P-CCNC: 18 U/L (ref 0–45)
BILIRUB SERPL-MCNC: 0.3 MG/DL
BUN SERPL-MCNC: 14.3 MG/DL (ref 6–20)
CALCIUM SERPL-MCNC: 9 MG/DL (ref 8.6–10)
CHLORIDE SERPL-SCNC: 102 MMOL/L (ref 98–107)
CHOLEST SERPL-MCNC: 167 MG/DL
CREAT SERPL-MCNC: 0.96 MG/DL (ref 0.51–0.95)
DEPRECATED HCO3 PLAS-SCNC: 26 MMOL/L (ref 22–29)
ERYTHROCYTE [DISTWIDTH] IN BLOOD BY AUTOMATED COUNT: 12.1 % (ref 10–15)
GFR SERPL CREATININE-BSD FRML MDRD: 73 ML/MIN/1.73M2
GLUCOSE SERPL-MCNC: 95 MG/DL (ref 70–99)
HCT VFR BLD AUTO: 39.6 % (ref 35–47)
HDLC SERPL-MCNC: 67 MG/DL
HGB BLD-MCNC: 13.5 G/DL (ref 11.7–15.7)
LDLC SERPL CALC-MCNC: 87 MG/DL
MCH RBC QN AUTO: 31.9 PG (ref 26.5–33)
MCHC RBC AUTO-ENTMCNC: 34.1 G/DL (ref 31.5–36.5)
MCV RBC AUTO: 94 FL (ref 78–100)
NONHDLC SERPL-MCNC: 100 MG/DL
PLATELET # BLD AUTO: 345 10E3/UL (ref 150–450)
POTASSIUM SERPL-SCNC: 4.3 MMOL/L (ref 3.4–5.3)
PROT SERPL-MCNC: 6.7 G/DL (ref 6.4–8.3)
RBC # BLD AUTO: 4.23 10E6/UL (ref 3.8–5.2)
SODIUM SERPL-SCNC: 137 MMOL/L (ref 136–145)
TRIGL SERPL-MCNC: 66 MG/DL
TSH SERPL DL<=0.005 MIU/L-ACNC: 1.86 UIU/ML (ref 0.3–4.2)
WBC # BLD AUTO: 11.7 10E3/UL (ref 4–11)

## 2023-08-02 PROCEDURE — 80061 LIPID PANEL: CPT

## 2023-08-02 PROCEDURE — 80053 COMPREHEN METABOLIC PANEL: CPT

## 2023-08-02 PROCEDURE — 84443 ASSAY THYROID STIM HORMONE: CPT

## 2023-08-02 PROCEDURE — 36415 COLL VENOUS BLD VENIPUNCTURE: CPT

## 2023-08-02 PROCEDURE — 85027 COMPLETE CBC AUTOMATED: CPT

## 2023-08-03 NOTE — RESULT ENCOUNTER NOTE
Amanda,    All of your labs were normal for you.  Very slight white cell elevation, but this is improved from before.    Have a nice day!    Dr. Warner

## 2023-09-26 DIAGNOSIS — M54.9 INTRACTABLE BACK PAIN: ICD-10-CM

## 2023-09-27 RX ORDER — MELOXICAM 15 MG/1
TABLET ORAL
Qty: 90 TABLET | Refills: 0 | Status: SHIPPED | OUTPATIENT
Start: 2023-09-27 | End: 2023-11-17

## 2023-10-07 DIAGNOSIS — E03.9 ACQUIRED HYPOTHYROIDISM: ICD-10-CM

## 2023-10-09 ENCOUNTER — PATIENT OUTREACH (OUTPATIENT)
Dept: CARE COORDINATION | Facility: CLINIC | Age: 48
End: 2023-10-09
Payer: COMMERCIAL

## 2023-10-10 RX ORDER — LEVOTHYROXINE SODIUM 100 UG/1
100 TABLET ORAL DAILY
Qty: 90 TABLET | Refills: 3 | Status: SHIPPED | OUTPATIENT
Start: 2023-10-10 | End: 2024-09-23

## 2023-11-06 ENCOUNTER — PATIENT OUTREACH (OUTPATIENT)
Dept: CARE COORDINATION | Facility: CLINIC | Age: 48
End: 2023-11-06
Payer: COMMERCIAL

## 2023-11-09 ENCOUNTER — HOSPITAL ENCOUNTER (OUTPATIENT)
Dept: MAMMOGRAPHY | Facility: CLINIC | Age: 48
Discharge: HOME | End: 2023-11-09
Attending: FAMILY MEDICINE | Admitting: FAMILY MEDICINE
Payer: COMMERCIAL

## 2023-11-09 DIAGNOSIS — Z12.31 VISIT FOR SCREENING MAMMOGRAM: ICD-10-CM

## 2023-11-09 PROCEDURE — 77067 SCR MAMMO BI INCL CAD: CPT

## 2023-11-16 DIAGNOSIS — M54.9 INTRACTABLE BACK PAIN: ICD-10-CM

## 2023-11-16 DIAGNOSIS — G43.119 INTRACTABLE MIGRAINE WITH AURA WITHOUT STATUS MIGRAINOSUS: ICD-10-CM

## 2023-11-17 DIAGNOSIS — M54.9 INTRACTABLE BACK PAIN: ICD-10-CM

## 2023-11-19 RX ORDER — MELOXICAM 15 MG/1
TABLET ORAL
Qty: 90 TABLET | Refills: 0 | Status: SHIPPED | OUTPATIENT
Start: 2023-11-19 | End: 2024-01-05

## 2023-12-05 DIAGNOSIS — G43.119 INTRACTABLE MIGRAINE WITH AURA WITHOUT STATUS MIGRAINOSUS: ICD-10-CM

## 2023-12-05 DIAGNOSIS — M54.9 INTRACTABLE BACK PAIN: ICD-10-CM

## 2024-01-04 DIAGNOSIS — M54.9 INTRACTABLE BACK PAIN: ICD-10-CM

## 2024-01-05 RX ORDER — MELOXICAM 15 MG/1
TABLET ORAL
Qty: 90 TABLET | Refills: 0 | Status: SHIPPED | OUTPATIENT
Start: 2024-01-05 | End: 2024-04-02

## 2024-03-06 ENCOUNTER — TELEPHONE (OUTPATIENT)
Dept: FAMILY MEDICINE | Facility: OTHER | Age: 49
End: 2024-03-06

## 2024-03-06 ENCOUNTER — OFFICE VISIT (OUTPATIENT)
Dept: FAMILY MEDICINE | Facility: OTHER | Age: 49
End: 2024-03-06
Payer: COMMERCIAL

## 2024-03-06 VITALS
OXYGEN SATURATION: 99 % | TEMPERATURE: 97.1 F | WEIGHT: 211 LBS | RESPIRATION RATE: 16 BRPM | BODY MASS INDEX: 34.28 KG/M2 | SYSTOLIC BLOOD PRESSURE: 118 MMHG | DIASTOLIC BLOOD PRESSURE: 70 MMHG | HEART RATE: 77 BPM

## 2024-03-06 DIAGNOSIS — M54.9 INTRACTABLE BACK PAIN: ICD-10-CM

## 2024-03-06 DIAGNOSIS — F39 EPISODIC MOOD DISORDER (H): ICD-10-CM

## 2024-03-06 DIAGNOSIS — G43.119 INTRACTABLE MIGRAINE WITH AURA WITHOUT STATUS MIGRAINOSUS: ICD-10-CM

## 2024-03-06 DIAGNOSIS — L91.8 SKIN TAG: Primary | ICD-10-CM

## 2024-03-06 DIAGNOSIS — H02.9 EYELID ABNORMALITY: ICD-10-CM

## 2024-03-06 PROCEDURE — 99214 OFFICE O/P EST MOD 30 MIN: CPT | Performed by: FAMILY MEDICINE

## 2024-03-06 RX ORDER — AMITRIPTYLINE HYDROCHLORIDE 10 MG/1
10-20 TABLET ORAL AT BEDTIME
Qty: 180 TABLET | Refills: 1 | Status: SHIPPED | OUTPATIENT
Start: 2024-03-06 | End: 2024-08-14

## 2024-03-06 RX ORDER — AMITRIPTYLINE HYDROCHLORIDE 10 MG/1
10-20 TABLET ORAL AT BEDTIME
Qty: 180 TABLET | Refills: 1 | Status: SHIPPED | OUTPATIENT
Start: 2024-03-06 | End: 2024-03-06

## 2024-03-06 ASSESSMENT — PAIN SCALES - GENERAL: PAINLEVEL: NO PAIN (0)

## 2024-03-06 NOTE — TELEPHONE ENCOUNTER
Rx sent for Amitriptyline 10 mg has 2 sets of directions please resend for correct rx , 1-2 hs and 1 hs  Karine Wyatt, Pharmacy [facility] [place] [phone]

## 2024-03-06 NOTE — PATIENT INSTRUCTIONS
Let's see what the eye doctor says.  I think they can do a nice job of this.      Can try the lower dose of amitriptyline.  Let me know if problems.    Contact us or return if questions or concerns.    Have a nice day!    Dr. Warner

## 2024-03-06 NOTE — PROGRESS NOTES
"  Assessment & Plan       ICD-10-CM    1. Skin tag  L91.8 Adult Eye  Referral      2. Eyelid abnormality  H02.9 Adult Eye  Referral      3. Intractable back pain  M54.9 DISCONTINUED: amitriptyline (ELAVIL) 10 MG tablet      4. Intractable migraine with aura without status migrainosus  G43.119 DISCONTINUED: amitriptyline (ELAVIL) 10 MG tablet      5. Episodic mood disorder (H24)  F39            1, 2.  Since this is right at the lash line, discussed that I would be somewhat hesitant to attempt removal in the clinic.  Recommended referral to ophthalmology for removal.  If she prefers, could also see plastics or dermatology.  Follow-up as needed.  3.  Responding adequately to current treatment.  Will continue.  4.  Responding adequately to current treatment, but patient does note some sedation from the amitriptyline.  Because of this, we will try a lower dose of 10 to 20 mg instead of 25 mg.  Continue nightly as needed.  5.  Good control clinically.  Continue current regimen.      Portions of this note were completed using Dragon dictation software.  Although reviewed, there may be typographical and other inadvertent errors that remain.       Ordering of each unique test  Prescription drug management        BMI  Estimated body mass index is 34.28 kg/m  as calculated from the following:    Height as of 5/31/23: 1.671 m (5' 5.79\").    Weight as of this encounter: 95.7 kg (211 lb).   Weight management plan: Discussed healthy diet and exercise guidelines      Patient Instructions   Let's see what the eye doctor says.  I think they can do a nice job of this.      Can try the lower dose of amitriptyline.  Let me know if problems.    Contact us or return if questions or concerns.    Have a nice day!    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  She exercises with enough effort to increase her heart rate 3 or less days per week.   She is taking medications regularly.     Her left eyelash line, she has what she thinks is a mole or skin tag.  It has been present for a couple of years.  Can camouflage it with makeup.  Has been slowly enlarging, getting more bothersome.  Doesn't currently interfere with her vision.    Her optometrist recommended she have it looked at.      Her back pain is well controlled with amitriptyline and meloxicam on occasion.  Rarely gets migraines as well.            Objective    /70   Pulse 77   Temp 97.1  F (36.2  C) (Temporal)   Resp 16   Wt 95.7 kg (211 lb)   SpO2 99%   BMI 34.28 kg/m    Body mass index is 34.28 kg/m .  Physical Exam   GENERAL: alert and no distress  EYES: Eyes grossly normal to inspection and eyelids- tag on left upper lid, right on lash line.  NECK: no adenopathy, no asymmetry, masses, or scars  MS: no gross musculoskeletal defects noted, no edema    Lab on 08/02/2023   Component Date Value Ref Range Status    WBC Count 08/02/2023 11.7 (H)  4.0 - 11.0 10e3/uL Final    RBC Count 08/02/2023 4.23  3.80 - 5.20 10e6/uL Final    Hemoglobin 08/02/2023 13.5  11.7 - 15.7 g/dL Final    Hematocrit 08/02/2023 39.6  35.0 - 47.0 % Final    MCV 08/02/2023 94  78 - 100 fL Final    MCH 08/02/2023 31.9  26.5 - 33.0 pg Final    MCHC 08/02/2023 34.1  31.5 - 36.5 g/dL Final    RDW 08/02/2023 12.1  10.0 - 15.0 % Final    Platelet Count 08/02/2023 345  150 - 450 10e3/uL Final    Sodium 08/02/2023 137  136 - 145 mmol/L Final    Potassium  08/02/2023 4.3  3.4 - 5.3 mmol/L Final    Chloride 08/02/2023 102  98 - 107 mmol/L Final    Carbon Dioxide (CO2) 08/02/2023 26  22 - 29 mmol/L Final    Anion Gap 08/02/2023 9  7 - 15 mmol/L Final    Urea Nitrogen 08/02/2023 14.3  6.0 - 20.0 mg/dL Final    Creatinine 08/02/2023 0.96 (H)  0.51 - 0.95 mg/dL Final    Calcium 08/02/2023 9.0  8.6 - 10.0 mg/dL Final    Glucose 08/02/2023 95  70 - 99 mg/dL Final    Alkaline Phosphatase 08/02/2023 64  35 - 104 U/L Final    AST 08/02/2023 18  0 - 45 U/L Final    Reference intervals for this test were updated on 6/12/2023 to more accurately reflect our healthy population. There may be differences in the flagging of prior results with similar values performed with this method. Interpretation of those prior results can be made in the context of the updated reference intervals.    ALT 08/02/2023 8  0 - 50 U/L Final    Reference intervals for this test were updated on 6/12/2023 to more accurately reflect our healthy population. There may be differences in the flagging of prior results with similar values performed with this method. Interpretation of those prior results can be made in the context of the updated reference intervals.      Protein Total 08/02/2023 6.7  6.4 - 8.3 g/dL Final    Albumin 08/02/2023 4.2  3.5 - 5.2 g/dL Final    Bilirubin Total 08/02/2023 0.3  <=1.2 mg/dL Final    GFR Estimate 08/02/2023 73  >60 mL/min/1.73m2 Final    TSH 08/02/2023 1.86  0.30 - 4.20 uIU/mL Final    Cholesterol 08/02/2023 167  <200 mg/dL Final    Triglycerides 08/02/2023 66  <150 mg/dL Final    Direct Measure HDL 08/02/2023 67  >=50 mg/dL Final    LDL Cholesterol Calculated 08/02/2023 87  <=100 mg/dL Final    Non HDL Cholesterol 08/02/2023 100  <130 mg/dL Final     No results found for any visits on 03/06/24.  No results found for this or any previous visit (from the past 24 hour(s)).        Signed Electronically by: Sonny Warner MD, MD

## 2024-03-30 DIAGNOSIS — M54.9 INTRACTABLE BACK PAIN: ICD-10-CM

## 2024-04-02 RX ORDER — MELOXICAM 15 MG/1
TABLET ORAL
Qty: 90 TABLET | Refills: 0 | Status: SHIPPED | OUTPATIENT
Start: 2024-04-02 | End: 2024-07-01

## 2024-04-03 DIAGNOSIS — M54.9 INTRACTABLE BACK PAIN: ICD-10-CM

## 2024-04-03 RX ORDER — MELOXICAM 15 MG/1
TABLET ORAL
Qty: 90 TABLET | Refills: 3 | OUTPATIENT
Start: 2024-04-03

## 2024-04-23 ENCOUNTER — OFFICE VISIT (OUTPATIENT)
Dept: OPHTHALMOLOGY | Facility: CLINIC | Age: 49
End: 2024-04-23
Attending: FAMILY MEDICINE
Payer: COMMERCIAL

## 2024-04-23 DIAGNOSIS — H02.9 EYELID ABNORMALITY: ICD-10-CM

## 2024-04-23 DIAGNOSIS — L91.8 SKIN TAG: ICD-10-CM

## 2024-04-23 DIAGNOSIS — D23.121 PAPILLOMA OF LEFT UPPER EYELID: Primary | ICD-10-CM

## 2024-04-23 PROCEDURE — 67840 REMOVE EYELID LESION: CPT | Mod: E1 | Performed by: OPHTHALMOLOGY

## 2024-04-23 RX ORDER — ERYTHROMYCIN 5 MG/G
OINTMENT OPHTHALMIC ONCE
Status: COMPLETED | OUTPATIENT
Start: 2024-04-23 | End: 2024-04-23

## 2024-04-23 RX ADMIN — ERYTHROMYCIN: 5 OINTMENT OPHTHALMIC at 12:49

## 2024-04-23 ASSESSMENT — VISUAL ACUITY
CORRECTION_TYPE: GLASSES
OD_CC: 20/20
OS_CC: 20/20
METHOD: SNELLEN - LINEAR

## 2024-04-23 ASSESSMENT — TONOMETRY
IOP_METHOD: TONOPEN
OD_IOP_MMHG: 15
OS_IOP_MMHG: 14

## 2024-04-23 ASSESSMENT — CONF VISUAL FIELD
OS_SUPERIOR_TEMPORAL_RESTRICTION: 0
OD_NORMAL: 1
OS_NORMAL: 1
OD_SUPERIOR_NASAL_RESTRICTION: 0
OD_SUPERIOR_TEMPORAL_RESTRICTION: 0
OS_INFERIOR_TEMPORAL_RESTRICTION: 0
OS_INFERIOR_NASAL_RESTRICTION: 0
METHOD: COUNTING FINGERS
OS_SUPERIOR_NASAL_RESTRICTION: 0
OD_INFERIOR_NASAL_RESTRICTION: 0
OD_INFERIOR_TEMPORAL_RESTRICTION: 0

## 2024-04-23 ASSESSMENT — SLIT LAMP EXAM - LIDS: COMMENTS: NORMAL

## 2024-04-23 NOTE — NURSING NOTE
Chief Complaints and History of Present Illnesses   Patient presents with    Lesion On Left Upper Lid     Chief Complaint(s) and History of Present Illness(es)       Lesion On Left Upper Lid              Laterality: left upper lid              Comments    Pt referred to clinic by PCP for evaluation of one lid lesion on the MANUEL on the temporal lash line. This has been present for about 2 years, marginally larger than at the onset. Flesh colored, mild itching, no drainage.

## 2024-04-23 NOTE — PROGRESS NOTES
HPI       Lesion On Left Upper Lid    In left upper lid.             Comments    Pt referred to clinic by PCP for evaluation of one lid lesion on the MANUEL on the temporal lash line. This has been present for about 2 years, marginally larger than at the onset. Flesh colored, mild itching, no drainage.               Last edited by Carrie Lao on 4/23/2024 12:26 PM.         Review of systems for the eyes was negative other than the pertinent positives/negatives listed in the HPI.      Assessment & Plan    HPI:  Amanda Pittman is a 48 year old female who presents for eyelid lesion evaluation. She has noted a lesion for several years. Initially grew in size but is now stable. Notes increasing irritation, especially when applying or removing makeup.       POHx: denies  PMHx: hypothyroid, migraine  Current Medications:   Current Outpatient Medications   Medication Sig Dispense Refill    amitriptyline (ELAVIL) 10 MG tablet Take 1-2 tablets (10-20 mg) by mouth at bedtime 180 tablet 1    aspirin-acetaminophen-caffeine (EXCEDRIN MIGRAINE) 250-250-65 MG tablet Take 1 tablet by mouth every 6 hours as needed for headaches      cyclobenzaprine (FLEXERIL) 10 MG tablet Take 1 tablet (10 mg) by mouth 3 times daily as needed for muscle spasms 90 tablet 1    levothyroxine (SYNTHROID/LEVOTHROID) 100 MCG tablet TAKE 1 TABLET BY MOUTH DAILY 90 tablet 3    meloxicam (MOBIC) 15 MG tablet TAKE 1 TABLET BY MOUTH ONCE  DAILY AS NEEDED FOR MODERATE  PAIN 90 tablet 0     No current facility-administered medications for this visit.       Current Eye Medications:      Assessment & Plan:  (D23.121) Papilloma of left upper eyelid  (primary encounter diagnosis)  Risks, benefits and alternatives discussed  Patient elects to proceed with removal due to irritation  Plan: erythromycin (ROMYCIN) ophthalmic ointment three times a day x 5 days        Return in about 4 weeks (around 5/21/2024) for Follow Up-v/t.        Chandu Thomas MD     Attending  Physician Attestation:  Complete documentation of historical and exam elements from today's encounter can be found in the full encounter summary report (not reduplicated in this progress note).  I personally obtained the chief complaint(s) and history of present illness.  I confirmed and edited as necessary the review of systems, past medical/surgical history, family history, social history, and examination findings as documented by others; and I examined the patient myself.  I personally reviewed the relevant tests, images, and reports as documented above.  I formulated and edited as necessary the assessment and plan and discussed the findings and management plan with the patient and family. - Chandu Thomas MD

## 2024-05-01 ENCOUNTER — PATIENT OUTREACH (OUTPATIENT)
Dept: CARE COORDINATION | Facility: CLINIC | Age: 49
End: 2024-05-01
Payer: COMMERCIAL

## 2024-05-15 ENCOUNTER — PATIENT OUTREACH (OUTPATIENT)
Dept: CARE COORDINATION | Facility: CLINIC | Age: 49
End: 2024-05-15
Payer: COMMERCIAL

## 2024-06-28 DIAGNOSIS — M54.9 INTRACTABLE BACK PAIN: ICD-10-CM

## 2024-07-01 RX ORDER — MELOXICAM 15 MG/1
TABLET ORAL
Qty: 90 TABLET | Refills: 0 | Status: SHIPPED | OUTPATIENT
Start: 2024-07-01 | End: 2024-09-09

## 2024-08-03 ENCOUNTER — HEALTH MAINTENANCE LETTER (OUTPATIENT)
Age: 49
End: 2024-08-03

## 2024-08-14 DIAGNOSIS — M54.9 INTRACTABLE BACK PAIN: ICD-10-CM

## 2024-08-14 DIAGNOSIS — G43.119 INTRACTABLE MIGRAINE WITH AURA WITHOUT STATUS MIGRAINOSUS: ICD-10-CM

## 2024-08-15 RX ORDER — AMITRIPTYLINE HYDROCHLORIDE 10 MG/1
10-20 TABLET ORAL AT BEDTIME
Qty: 180 TABLET | Refills: 0 | Status: SHIPPED | OUTPATIENT
Start: 2024-08-15 | End: 2024-09-27

## 2024-09-06 DIAGNOSIS — Z51.81 ENCOUNTER FOR THERAPEUTIC DRUG MONITORING: Primary | ICD-10-CM

## 2024-09-06 DIAGNOSIS — E03.9 ACQUIRED HYPOTHYROIDISM: ICD-10-CM

## 2024-09-06 DIAGNOSIS — M54.9 INTRACTABLE BACK PAIN: ICD-10-CM

## 2024-09-09 RX ORDER — MELOXICAM 15 MG/1
TABLET ORAL
Qty: 30 TABLET | Refills: 0 | Status: SHIPPED | OUTPATIENT
Start: 2024-09-09 | End: 2024-09-27

## 2024-09-09 NOTE — TELEPHONE ENCOUNTER
Your medication has been refilled.  Please schedule a lab visit or physical to follow up on how this medication is working for you before your next refill.  We need to be sure everything is working well and stable prior to further refills.

## 2024-09-21 DIAGNOSIS — E03.9 ACQUIRED HYPOTHYROIDISM: ICD-10-CM

## 2024-09-23 RX ORDER — LEVOTHYROXINE SODIUM 100 UG/1
100 TABLET ORAL DAILY
Qty: 30 TABLET | Refills: 0 | Status: SHIPPED | OUTPATIENT
Start: 2024-09-23 | End: 2024-09-27

## 2024-09-27 ENCOUNTER — OFFICE VISIT (OUTPATIENT)
Dept: FAMILY MEDICINE | Facility: CLINIC | Age: 49
End: 2024-09-27
Payer: COMMERCIAL

## 2024-09-27 VITALS
DIASTOLIC BLOOD PRESSURE: 70 MMHG | HEIGHT: 66 IN | SYSTOLIC BLOOD PRESSURE: 112 MMHG | OXYGEN SATURATION: 98 % | TEMPERATURE: 97.6 F | WEIGHT: 222 LBS | HEART RATE: 72 BPM | BODY MASS INDEX: 35.68 KG/M2 | RESPIRATION RATE: 18 BRPM

## 2024-09-27 DIAGNOSIS — G43.119 INTRACTABLE MIGRAINE WITH AURA WITHOUT STATUS MIGRAINOSUS: ICD-10-CM

## 2024-09-27 DIAGNOSIS — Z51.81 ENCOUNTER FOR THERAPEUTIC DRUG MONITORING: ICD-10-CM

## 2024-09-27 DIAGNOSIS — M54.9 INTRACTABLE BACK PAIN: ICD-10-CM

## 2024-09-27 DIAGNOSIS — Z00.00 ROUTINE GENERAL MEDICAL EXAMINATION AT A HEALTH CARE FACILITY: Primary | ICD-10-CM

## 2024-09-27 DIAGNOSIS — F41.9 ANXIETY: ICD-10-CM

## 2024-09-27 DIAGNOSIS — E03.9 ACQUIRED HYPOTHYROIDISM: ICD-10-CM

## 2024-09-27 LAB
ALBUMIN SERPL BCG-MCNC: 4.1 G/DL (ref 3.5–5.2)
ALP SERPL-CCNC: 70 U/L (ref 40–150)
ALT SERPL W P-5'-P-CCNC: 6 U/L (ref 0–50)
ANION GAP SERPL CALCULATED.3IONS-SCNC: 10 MMOL/L (ref 7–15)
AST SERPL W P-5'-P-CCNC: 16 U/L (ref 0–45)
BILIRUB SERPL-MCNC: 0.4 MG/DL
BUN SERPL-MCNC: 14.7 MG/DL (ref 6–20)
CALCIUM SERPL-MCNC: 9 MG/DL (ref 8.8–10.4)
CHLORIDE SERPL-SCNC: 105 MMOL/L (ref 98–107)
CHOLEST SERPL-MCNC: 179 MG/DL
CREAT SERPL-MCNC: 1.04 MG/DL (ref 0.51–0.95)
EGFRCR SERPLBLD CKD-EPI 2021: 66 ML/MIN/1.73M2
ERYTHROCYTE [DISTWIDTH] IN BLOOD BY AUTOMATED COUNT: 12.2 % (ref 10–15)
FASTING STATUS PATIENT QL REPORTED: YES
FASTING STATUS PATIENT QL REPORTED: YES
GLUCOSE SERPL-MCNC: 99 MG/DL (ref 70–99)
HCO3 SERPL-SCNC: 24 MMOL/L (ref 22–29)
HCT VFR BLD AUTO: 41.4 % (ref 35–47)
HDLC SERPL-MCNC: 57 MG/DL
HGB BLD-MCNC: 14.4 G/DL (ref 11.7–15.7)
LDLC SERPL CALC-MCNC: 108 MG/DL
MCH RBC QN AUTO: 31.5 PG (ref 26.5–33)
MCHC RBC AUTO-ENTMCNC: 34.8 G/DL (ref 31.5–36.5)
MCV RBC AUTO: 91 FL (ref 78–100)
NONHDLC SERPL-MCNC: 122 MG/DL
PLATELET # BLD AUTO: 310 10E3/UL (ref 150–450)
POTASSIUM SERPL-SCNC: 4.4 MMOL/L (ref 3.4–5.3)
PROT SERPL-MCNC: 7.1 G/DL (ref 6.4–8.3)
RBC # BLD AUTO: 4.57 10E6/UL (ref 3.8–5.2)
SODIUM SERPL-SCNC: 139 MMOL/L (ref 135–145)
TRIGL SERPL-MCNC: 69 MG/DL
TSH SERPL DL<=0.005 MIU/L-ACNC: 2.74 UIU/ML (ref 0.3–4.2)
WBC # BLD AUTO: 8.8 10E3/UL (ref 4–11)

## 2024-09-27 PROCEDURE — 80061 LIPID PANEL: CPT | Performed by: FAMILY MEDICINE

## 2024-09-27 PROCEDURE — 36415 COLL VENOUS BLD VENIPUNCTURE: CPT | Performed by: FAMILY MEDICINE

## 2024-09-27 PROCEDURE — 99396 PREV VISIT EST AGE 40-64: CPT | Mod: 25 | Performed by: FAMILY MEDICINE

## 2024-09-27 PROCEDURE — 99214 OFFICE O/P EST MOD 30 MIN: CPT | Mod: 25 | Performed by: FAMILY MEDICINE

## 2024-09-27 PROCEDURE — 80053 COMPREHEN METABOLIC PANEL: CPT | Performed by: FAMILY MEDICINE

## 2024-09-27 PROCEDURE — 90715 TDAP VACCINE 7 YRS/> IM: CPT | Performed by: FAMILY MEDICINE

## 2024-09-27 PROCEDURE — 85027 COMPLETE CBC AUTOMATED: CPT | Performed by: FAMILY MEDICINE

## 2024-09-27 PROCEDURE — 90471 IMMUNIZATION ADMIN: CPT | Performed by: FAMILY MEDICINE

## 2024-09-27 PROCEDURE — 84443 ASSAY THYROID STIM HORMONE: CPT | Performed by: FAMILY MEDICINE

## 2024-09-27 RX ORDER — HYDROXYZINE PAMOATE 25 MG/1
25 CAPSULE ORAL 3 TIMES DAILY PRN
Qty: 30 CAPSULE | Refills: 1 | Status: SHIPPED | OUTPATIENT
Start: 2024-09-27

## 2024-09-27 RX ORDER — MELOXICAM 15 MG/1
TABLET ORAL
Qty: 90 TABLET | Refills: 3 | Status: SHIPPED | OUTPATIENT
Start: 2024-09-27

## 2024-09-27 RX ORDER — AMITRIPTYLINE HYDROCHLORIDE 10 MG/1
10-20 TABLET ORAL AT BEDTIME
Qty: 180 TABLET | Refills: 2 | Status: SHIPPED | OUTPATIENT
Start: 2024-09-27

## 2024-09-27 RX ORDER — ESCITALOPRAM OXALATE 10 MG/1
10 TABLET ORAL DAILY
Qty: 30 TABLET | Refills: 1 | Status: SHIPPED | OUTPATIENT
Start: 2024-09-27

## 2024-09-27 RX ORDER — LEVOTHYROXINE SODIUM 100 UG/1
100 TABLET ORAL DAILY
Qty: 90 TABLET | Refills: 3 | Status: SHIPPED | OUTPATIENT
Start: 2024-09-27

## 2024-09-27 ASSESSMENT — ANXIETY QUESTIONNAIRES
5. BEING SO RESTLESS THAT IT IS HARD TO SIT STILL: NOT AT ALL
GAD7 TOTAL SCORE: 9
IF YOU CHECKED OFF ANY PROBLEMS ON THIS QUESTIONNAIRE, HOW DIFFICULT HAVE THESE PROBLEMS MADE IT FOR YOU TO DO YOUR WORK, TAKE CARE OF THINGS AT HOME, OR GET ALONG WITH OTHER PEOPLE: SOMEWHAT DIFFICULT
GAD7 TOTAL SCORE: 9
1. FEELING NERVOUS, ANXIOUS, OR ON EDGE: MORE THAN HALF THE DAYS
6. BECOMING EASILY ANNOYED OR IRRITABLE: SEVERAL DAYS
3. WORRYING TOO MUCH ABOUT DIFFERENT THINGS: MORE THAN HALF THE DAYS
2. NOT BEING ABLE TO STOP OR CONTROL WORRYING: MORE THAN HALF THE DAYS
7. FEELING AFRAID AS IF SOMETHING AWFUL MIGHT HAPPEN: SEVERAL DAYS

## 2024-09-27 ASSESSMENT — PATIENT HEALTH QUESTIONNAIRE - PHQ9
SUM OF ALL RESPONSES TO PHQ QUESTIONS 1-9: 6
5. POOR APPETITE OR OVEREATING: SEVERAL DAYS

## 2024-09-27 ASSESSMENT — PAIN SCALES - GENERAL: PAINLEVEL: NO PAIN (0)

## 2024-09-27 NOTE — NURSING NOTE
Prior to immunization administration, verified patients identity using patient s name and date of birth. Please see Immunization Activity for additional information.     Screening Questionnaire for Adult Immunization    Are you sick today?   No   Do you have allergies to medications, food, a vaccine component or latex?   No   Have you ever had a serious reaction after receiving a vaccination?   No   Do you have a long-term health problem with heart, lung, kidney, or metabolic disease (e.g., diabetes), asthma, a blood disorder, no spleen, complement component deficiency, a cochlear implant, or a spinal fluid leak?  Are you on long-term aspirin therapy?   No   Do you have cancer, leukemia, HIV/AIDS, or any other immune system problem?   No   Do you have a parent, brother, or sister with an immune system problem?   No   In the past 3 months, have you taken medications that affect  your immune system, such as prednisone, other steroids, or anticancer drugs; drugs for the treatment of rheumatoid arthritis, Crohn s disease, or psoriasis; or have you had radiation treatments?   No   Have you had a seizure, or a brain or other nervous system problem?   No   During the past year, have you received a transfusion of blood or blood    products, or been given immune (gamma) globulin or antiviral drug?   No   For women: Are you pregnant or is there a chance you could become       pregnant during the next month?   No   Have you received any vaccinations in the past 4 weeks?   No     Immunization questionnaire answers were all negative.      Patient instructed to remain in clinic for 15 minutes afterwards, and to report any adverse reactions.     Screening performed by Lou Eisenberg MA on 9/27/2024 at 7:47 AM.

## 2024-09-27 NOTE — RESULT ENCOUNTER NOTE
Amanda,    All of your labs were normal for you.    Have a nice day!    Dr. Warner      The 10-year ASCVD risk score (Jannette MARTINEZ, et al., 2019) is: 0.7%    Values used to calculate the score:      Age: 48 years      Sex: Female      Is Non- : No      Diabetic: No      Tobacco smoker: No      Systolic Blood Pressure: 112 mmHg      Is BP treated: No      HDL Cholesterol: 57 mg/dL      Total Cholesterol: 179 mg/dL

## 2024-09-27 NOTE — PROGRESS NOTES
Preventive Care Visit  [facility] [place]  Sonny Warner MD, MD, Family Medicine  Sep 27, 2024      Assessment & Plan       ICD-10-CM    1. Routine general medical examination at a health care facility  Z00.00 Comprehensive metabolic panel (BMP + Alb, Alk Phos, ALT, AST, Total. Bili, TP)     CBC with platelets     TSH with free T4 reflex     Lipid panel reflex to direct LDL Fasting     Lipid panel reflex to direct LDL Fasting     CANCELED: Comprehensive metabolic panel (BMP + Alb, Alk Phos, ALT, AST, Total. Bili, TP)     CANCELED: CBC with platelets     CANCELED: TSH with free T4 reflex      2. Anxiety  F41.9 hydrOXYzine yady (VISTARIL) 25 MG capsule     escitalopram (LEXAPRO) 10 MG tablet      3. Intractable back pain  M54.9 amitriptyline (ELAVIL) 10 MG tablet     meloxicam (MOBIC) 15 MG tablet     Comprehensive metabolic panel (BMP + Alb, Alk Phos, ALT, AST, Total. Bili, TP)     CBC with platelets      4. Intractable migraine with aura without status migrainosus  G43.119 amitriptyline (ELAVIL) 10 MG tablet      5. Acquired hypothyroidism  E03.9 levothyroxine (SYNTHROID/LEVOTHROID) 100 MCG tablet     TSH with free T4 reflex      6. Encounter for therapeutic drug monitoring  Z51.81 Comprehensive metabolic panel (BMP + Alb, Alk Phos, ALT, AST, Total. Bili, TP)     CBC with platelets     TSH with free T4 reflex           Reviewed recommended screenings and ordered appropriate testing for pt's risks and per pt's request(s).   Clinically present.  Discussed options for managing this.  Patient was interested in pursuing trial of SSRI for this.  She has a family member that has done well on Lexapro and she would like to try this initially.  Prescription given.  Also gave a prescription for hydroxyzine for as needed use.  Recommended follow-up in 1 to 2 months to ensure improvement.  Clinically under good control with current regimen.  Will check monitoring labs to ensure that she is  "tolerating this well.  Clinically controlled.  Will continue current regimen.  Clinically doing well.  Will continue current regimen and check monitoring labs.  Check labs.    Portions of this note were completed using Dragon dictation software.  Although reviewed, there may be typographical and other inadvertent errors that remain.               BMI  Estimated body mass index is 36.02 kg/m  as calculated from the following:    Height as of this encounter: 1.672 m (5' 5.83\").    Weight as of this encounter: 100.7 kg (222 lb).   Weight management plan: Discussed healthy diet and exercise guidelines    Counseling  Appropriate preventive services were addressed with this patient via screening, questionnaire, or discussion as appropriate for fall prevention, nutrition, physical activity, Tobacco-use cessation, social engagement, weight loss and cognition.  Checklist reviewing preventive services available has been given to the patient.  Reviewed patient's diet, addressing concerns and/or questions.   She is at risk for lack of exercise and has been provided with information to increase physical activity for the benefit of her well-being.   The patient's PHQ-9 score is consistent with mild depression. She was provided with information regarding depression.       See Patient Instructions  Let's try escitalopram for your anxiety.  Let me know if side effects or other problems.      Can use hydroxyzine as needed for panic/anxiety.  This will make you sleepy.      Let me know if issues with your other medications.    If you get more symptoms of menopause, we can consider other approaches.      Follow up in 1-2 months on anxiety.      We will let you know your results as soon as we can.  If you have MyChart, you will be able to see your lab and imaging results shortly after they become available.  I will review these and let you know my interpretation, but this usually takes additional time.  If you have multiple labs, I " "usually wait until they are all back before sending a note about them unless something is worrisome.  If you do not have MyChart, we will let you know your lab results as soon as we can.  If they are normal, this can take up to a week.       Marilu Patel is a 48 year old, presenting for the following:  Physical        9/27/2024     6:57 AM   Additional Questions   Roomed by Lou POON        Health Care Directive  Patient does not have a Health Care Directive or Living Will: Discussed advance care planning with patient; information given to patient to review.    HPI    Pt has been having panic attacks, mostly at night when sleeping.  Thinks it may be related to perimenopause.  Denies \"hot flashes\".      Has had increased stress at work over the past year.        1/14/2020     8:20 PM 1/17/2020    12:40 PM 9/27/2024     7:11 AM   PHQ   PHQ-9 Total Score 0 0 6   Q9: Thoughts of better off dead/self-harm past 2 weeks Not at all Not at all Not at all         1/14/2020     8:20 PM 1/17/2020    12:40 PM 9/27/2024     7:11 AM   MISAEL-7 SCORE   Total Score  5 (mild anxiety)    Total Score 5 5 9       Pt continues on meloxicam daily.  Mostly for daily back pain.  Ongoing issues with this.  All in her low back.  It was improving when she'd lost weight, but has worsened as her weight has increased.  Last MRI was in 2021.      Her periods are still regular, every 25-27 days.  Did have a summer of heavy bleeding, but has been normal since then.      Amitriptyline helps with her migraine prevention.  Lower dose has been helpful to avoid grogginess.      Thinks her thyroid is OK, but not certain.            9/22/2024   General Health   How would you rate your overall physical health? Good   Feel stress (tense, anxious, or unable to sleep) To some extent      (!) STRESS CONCERN      9/22/2024   Nutrition   Three or more servings of calcium each day? Yes   Diet: Regular (no restrictions)   How many servings of fruit and vegetables " per day? (!) 2-3   How many sweetened beverages each day? 0-1            9/22/2024   Exercise   Days per week of moderate/strenous exercise 2 days   Average minutes spent exercising at this level 20 min      (!) EXERCISE CONCERN      9/22/2024   Social Factors   Frequency of gathering with friends or relatives Once a week   Worry food won't last until get money to buy more No   Food not last or not have enough money for food? No   Do you have housing? (Housing is defined as stable permanent housing and does not include staying ouside in a car, in a tent, in an abandoned building, in an overnight shelter, or couch-surfing.) Yes   Are you worried about losing your housing? No   Lack of transportation? No   Unable to get utilities (heat,electricity)? No            9/22/2024   Dental   Dentist two times every year? Yes            9/22/2024   TB Screening   Were you born outside of the [place]? No            Today's PHQ-2 Score:       9/27/2024     7:01 AM   PHQ-2 ( 1999 [employer])   Q1: Little interest or pleasure in doing things 1   Q2: Feeling down, depressed or hopeless 1   PHQ-2 Score 2         9/22/2024   Substance Use   Alcohol more than 3/day or more than 7/wk No   Do you use any other substances recreationally? No        Social History     Tobacco Use    Smoking status: Never    Smokeless tobacco: Never    Tobacco comments:     used to smoke occ   Vaping Use    Vaping status: Never Used   Substance Use Topics    Alcohol use: No    Drug use: No           11/9/2023   LAST FHS-7 RESULTS   1st degree relative breast or ovarian cancer No   Any relative bilateral breast cancer No   Any male have breast cancer No   Any ONE woman have BOTH breast AND ovarian cancer No   Any woman with breast cancer before 50yrs No   2 or more relatives with breast AND/OR ovarian cancer No   2 or more relatives with breast AND/OR bowel cancer No           Mammogram Screening - Mammogram every 1-2 years updated in Health Maintenance based on  mutual decision making        2024   STI Screening   New sexual partner(s) since last STI/HIV test? No        History of abnormal Pap smear: No - age 30- 64 PAP with HPV every 5 years recommended        Latest Ref Rng & Units 2022     7:53 AM 2019    11:44 AM 2019    11:43 AM   PAP / HPV   PAP  Negative for Intraepithelial Lesion or Malignancy (NILM)      PAP (Historical)   NIL     HPV 16 DNA Negative Negative   Negative    HPV 18 DNA Negative Negative   Negative    Other HR HPV Negative Negative   Negative      ASCVD Risk   The 10-year ASCVD risk score (Jannette MARTINEZ, et al., 2019) is: 0.5%    Values used to calculate the score:      Age: 48 years      Sex: Female      Is Non- : No      Diabetic: No      Tobacco smoker: No      Systolic Blood Pressure: 112 mmHg      Is BP treated: No      HDL Cholesterol: 67 mg/dL      Total Cholesterol: 167 mg/dL        2024   Contraception/Family Planning   Questions about contraception or family planning No           Reviewed and updated as needed this visit by Provider                    BP Readings from Last 3 Encounters:   24 112/70   24 118/70   23 102/68    Wt Readings from Last 3 Encounters:   24 100.7 kg (222 lb)   24 95.7 kg (211 lb)   23 85 kg (187 lb 8 oz)                  Patient Active Problem List   Diagnosis    Hypothyroidism    Infertility management    Female infertility    CARDIOVASCULAR SCREENING; LDL GOAL LESS THAN 160    S/P LEEP (loop electrosurgical excision procedure)    History of cone biopsy of cervix complicating pregnancy    Status post  delivery    CMC (carpometacarpal joint) sprains, right, initial encounter    Intractable back pain    Back pain    Episodic mood disorder (H)     Past Surgical History:   Procedure Laterality Date    BIOPSY      Colposcopy     SECTION N/A 2014    Procedure:  SECTION;  Surgeon: Max Merida MD;   Location: [facility]    COLONOSCOPY      COLONOSCOPY N/A 2022    Procedure: COLONOSCOPY;  Surgeon: Erick Chopra MD;  Location: [facility]    HC COLP CERVIX/UPPER VAGINA W BX CERVIX/ENDOCERV CURETT  2006    SANDY 2/3, CIS     HC REMOVE TONSILS/ADENOIDS,12+ Y/O  1997    Tonsils 11+y.o. tonsilectomy    LASIK Bilateral     LasikPlus, formerly nearsighted    LEEP TX, CERVICAL  2006    TONSILLECTOMY  2008    Right - sided    ZZC  DELIVERY ONLY  2003    , Low Cervical    ZZHC MARSUP BARTHOLIN GLAND CYST      x4       Social History     Tobacco Use    Smoking status: Never    Smokeless tobacco: Never    Tobacco comments:     used to smoke occ   Substance Use Topics    Alcohol use: No     Family History   Problem Relation Age of Onset    Cancer Mother 72        myeloma    Other Cancer Mother         Multiple myeloma 2015    Heart Disease Father     Cancer Maternal Grandmother         uterus cancer    Heart Disease Maternal Grandfather     Heart Disease Paternal Grandfather          of MI    Glaucoma No family hx of     Macular Degeneration No family hx of          Current Outpatient Medications   Medication Sig Dispense Refill    amitriptyline (ELAVIL) 10 MG tablet Take 1-2 tablets (10-20 mg) by mouth at bedtime 180 tablet 0    levothyroxine (SYNTHROID/LEVOTHROID) 100 MCG tablet TAKE 1 TABLET BY MOUTH DAILY 30 tablet 0    meloxicam (MOBIC) 15 MG tablet TAKE 1 TABLET BY MOUTH ONCE  DAILY AS NEEDED FOR MODERATE  PAIN 30 tablet 0    aspirin-acetaminophen-caffeine (EXCEDRIN MIGRAINE) 250-250-65 MG tablet Take 1 tablet by mouth every 6 hours as needed for headaches      cyclobenzaprine (FLEXERIL) 10 MG tablet Take 1 tablet (10 mg) by mouth 3 times daily as needed for muscle spasms 90 tablet 1     Allergies   Allergen Reactions    Strawberry Extract Other (See Comments)     Sores in mouth    Percocet [Oxycodone-Acetaminophen] Other (See Comments)     Tongue swelling and hives  "    Recent Labs   Lab Test 08/02/23  1534 09/19/22  1624 09/17/21  1655 06/16/20  1513 03/27/20  0558   LDL 87  --  77  --   --    HDL 67  --  58  --   --    TRIG 66  --  64  --   --    ALT 8 30  --   --   --    CR 0.96* 0.93 0.95   < > 0.95   GFRESTIMATED 73 76 72   < > 72   GFRESTBLACK  --   --   --   --  84   POTASSIUM 4.3 4.3 4.0   < > 4.3   TSH 1.86 3.19 1.43   < >  --     < > = values in this interval not displayed.             Objective    Exam  /70   Pulse 72   Temp 97.6  F (36.4  C) (Temporal)   Resp 18   Ht 1.672 m (5' 5.83\")   Wt 100.7 kg (222 lb)   LMP 09/20/2024   SpO2 98%   BMI 36.02 kg/m     Estimated body mass index is 36.02 kg/m  as calculated from the following:    Height as of this encounter: 1.672 m (5' 5.83\").    Weight as of this encounter: 100.7 kg (222 lb).    Physical Exam  GENERAL: alert and no distress  EYES: Eyes grossly normal to inspection, PERRL and conjunctivae and sclerae normal  EYES: small cyst on upper eyelid.  HENT: ear canals and TM's normal, nose and mouth without ulcers or lesions  NECK: no adenopathy, no asymmetry, masses, or scars  RESP: lungs clear to auscultation - no rales, rhonchi or wheezes  CV: regular rate and rhythm, normal S1 S2, no S3 or S4, no murmur, click or rub, no peripheral edema  ABDOMEN: soft, nontender, no hepatosplenomegaly, no masses and bowel sounds normal  MS: no gross musculoskeletal defects noted, no edema  SKIN: no suspicious lesions or rashes  NEURO: Normal strength and tone, mentation intact and speech normal  PSYCH: mentation appears normal, affect normal/bright        Signed Electronically by: Sonny Warner MD, MD    "

## 2024-09-27 NOTE — PATIENT INSTRUCTIONS
Let's try escitalopram for your anxiety.  Let me know if side effects or other problems.      Can use hydroxyzine as needed for panic/anxiety.  This will make you sleepy.      Let me know if issues with your other medications.    If you get more symptoms of menopause, we can consider other approaches.      Follow up in 1-2 months on anxiety.      We will let you know your results as soon as we can.  If you have MyChart, you will be able to see your lab and imaging results shortly after they become available.  I will review these and let you know my interpretation, but this usually takes additional time.  If you have multiple labs, I usually wait until they are all back before sending a note about them unless something is worrisome.  If you do not have MyChart, we will let you know your lab results as soon as we can.  If they are normal, this can take up to a week.     I'd encourage you to update all your immunizations.      Contact us or return if questions or concerns.    Have a nice day!    Dr. Warner      Patient Education   Preventive Care Advice   This is general advice given by our system to help you stay healthy. However, your care team may have specific advice just for you. Please talk to your care team about your preventive care needs.  Nutrition  Eat 5 or more servings of fruits and vegetables each day.  Try wheat bread, brown rice and whole grain pasta (instead of white bread, rice, and pasta).  Get enough calcium and vitamin D. Check the label on foods and aim for 100% of the RDA (recommended daily allowance).  Lifestyle  Exercise at least 150 minutes each week  (30 minutes a day, 5 days a week).  Do muscle strengthening activities 2 days a week. These help control your weight and prevent disease.  No smoking.  Wear sunscreen to prevent skin cancer.  Have a dental exam and cleaning every 6 months.  Yearly exams  See your health care team every year to talk about:  Any changes in your health.  Any  medicines your care team has prescribed.  Preventive care, family planning, and ways to prevent chronic diseases.  Shots (vaccines)   HPV shots (up to age 26), if you've never had them before.  Hepatitis B shots (up to age 59), if you've never had them before.  COVID-19 shot: Get this shot when it's due.  Flu shot: Get a flu shot every year.  Tetanus shot: Get a tetanus shot every 10 years.  Pneumococcal, hepatitis A, and RSV shots: Ask your care team if you need these based on your risk.  Shingles shot (for age 50 and up)  General health tests  Diabetes screening:  Starting at age 35, Get screened for diabetes at least every 3 years.  If you are younger than age 35, ask your care team if you should be screened for diabetes.  Cholesterol test: At age 39, start having a cholesterol test every 5 years, or more often if advised.  Bone density scan (DEXA): At age 50, ask your care team if you should have this scan for osteoporosis (brittle bones).  Hepatitis C: Get tested at least once in your life.  STIs (sexually transmitted infections)  Before age 24: Ask your care team if you should be screened for STIs.  After age 24: Get screened for STIs if you're at risk. You are at risk for STIs (including HIV) if:  You are sexually active with more than one person.  You don't use condoms every time.  You or a partner was diagnosed with a sexually transmitted infection.  If you are at risk for HIV, ask about PrEP medicine to prevent HIV.  Get tested for HIV at least once in your life, whether you are at risk for HIV or not.  Cancer screening tests  Cervical cancer screening: If you have a cervix, begin getting regular cervical cancer screening tests starting at age 21.  Breast cancer scan (mammogram): If you've ever had breasts, begin having regular mammograms starting at age 40. This is a scan to check for breast cancer.  Colon cancer screening: It is important to start screening for colon cancer at age 45.  Have a colonoscopy  test every 10 years (or more often if you're at risk) Or, ask your provider about stool tests like a FIT test every year or Cologuard test every 3 years.  To learn more about your testing options, visit:   .  For help making a decision, visit:   [email or web address].[email or web address]/[email or web address].  Prostate cancer screening test: If you have a prostate, ask your care team if a prostate cancer screening test (PSA) at age 55 is right for you.  Lung cancer screening: If you are a current or former smoker ages 50 to 80, ask your care team if ongoing lung cancer screenings are right for you.  For informational purposes only. Not to replace the advice of your health care provider. Copyright   2023 [employer]. All rights reserved. Clinically reviewed by the [facility] Transitions Program. [employer] [id number] - REV 01/24.  Learning About Depression Screening  What is depression screening?  Depression screening is a way to see if you have depression symptoms. It may be done by a doctor or counselor. It's often part of a routine checkup. That's because your mental health is just as important as your physical health.  Depression is a mental health condition that affects how you feel, think, and act. You may:  Have less energy.  Lose interest in your daily activities.  Feel sad and grouchy for a long time.  Depression is very common. It affects people of all ages.  Many things can lead to depression. Some people become depressed after they have a stroke or find out they have a major illness like cancer or heart disease. The death of a loved one or a breakup may lead to depression. It can run in families. Most experts believe that a combination of inherited genes and stressful life events can cause it.  What happens during screening?  You may be asked to fill out a form about your depression symptoms. You and the doctor will discuss your answers. The doctor may ask you more questions to learn more about how you think, act, and feel.  What  "happens after screening?  If you have symptoms of depression, your doctor will talk to you about your options.  Doctors usually treat depression with medicines or counseling. Often, combining the two works best. Many people don't get help because they think that they'll get over the depression on their own. But people with depression may not get better unless they get treatment.  The cause of depression is not well understood. There may be many factors involved. But if you have depression, it's not your fault.  A serious symptom of depression is thinking about death or suicide. If you or someone you care about talks about this or about feeling hopeless, get help right away.  It's important to know that depression can be treated. Medicine, counseling, and self-care may help.  Where can you learn more?  Go to https://www.PayStand.net/patiented  Enter T185 in the search box to learn more about \"Learning About Depression Screening.\"  Current as of: June 24, 2023  Content Version: 14.1 2006-2024 [employer].   Care instructions adapted under license by your healthcare professional. If you have questions about a medical condition or this instruction, always ask your healthcare professional. [employer] disclaims any warranty or liability for your use of this information.       "

## 2024-10-09 DIAGNOSIS — F41.9 ANXIETY: ICD-10-CM

## 2024-10-09 RX ORDER — ESCITALOPRAM OXALATE 10 MG/1
10 TABLET ORAL DAILY
Qty: 30 TABLET | Refills: 1 | OUTPATIENT
Start: 2024-10-09

## 2024-11-25 ENCOUNTER — MYC MEDICAL ADVICE (OUTPATIENT)
Dept: FAMILY MEDICINE | Facility: OTHER | Age: 49
End: 2024-11-25
Payer: COMMERCIAL

## 2024-11-25 DIAGNOSIS — F41.9 ANXIETY: ICD-10-CM

## 2024-11-27 RX ORDER — ESCITALOPRAM OXALATE 10 MG/1
10 TABLET ORAL DAILY
Qty: 30 TABLET | Refills: 1 | Status: SHIPPED | OUTPATIENT
Start: 2024-11-27

## 2024-12-11 ASSESSMENT — ANXIETY QUESTIONNAIRES
GAD7 TOTAL SCORE: 4
GAD7 TOTAL SCORE: 4
2. NOT BEING ABLE TO STOP OR CONTROL WORRYING: SEVERAL DAYS
GAD7 TOTAL SCORE: 4
IF YOU CHECKED OFF ANY PROBLEMS ON THIS QUESTIONNAIRE, HOW DIFFICULT HAVE THESE PROBLEMS MADE IT FOR YOU TO DO YOUR WORK, TAKE CARE OF THINGS AT HOME, OR GET ALONG WITH OTHER PEOPLE: NOT DIFFICULT AT ALL
7. FEELING AFRAID AS IF SOMETHING AWFUL MIGHT HAPPEN: NOT AT ALL
1. FEELING NERVOUS, ANXIOUS, OR ON EDGE: SEVERAL DAYS
3. WORRYING TOO MUCH ABOUT DIFFERENT THINGS: SEVERAL DAYS
5. BEING SO RESTLESS THAT IT IS HARD TO SIT STILL: NOT AT ALL
6. BECOMING EASILY ANNOYED OR IRRITABLE: SEVERAL DAYS
7. FEELING AFRAID AS IF SOMETHING AWFUL MIGHT HAPPEN: NOT AT ALL
4. TROUBLE RELAXING: NOT AT ALL
8. IF YOU CHECKED OFF ANY PROBLEMS, HOW DIFFICULT HAVE THESE MADE IT FOR YOU TO DO YOUR WORK, TAKE CARE OF THINGS AT HOME, OR GET ALONG WITH OTHER PEOPLE?: NOT DIFFICULT AT ALL

## 2024-12-12 ENCOUNTER — MYC MEDICAL ADVICE (OUTPATIENT)
Dept: FAMILY MEDICINE | Facility: CLINIC | Age: 49
End: 2024-12-12

## 2024-12-12 ENCOUNTER — VIRTUAL VISIT (OUTPATIENT)
Dept: FAMILY MEDICINE | Facility: CLINIC | Age: 49
End: 2024-12-12
Payer: COMMERCIAL

## 2024-12-12 DIAGNOSIS — F41.9 ANXIETY: ICD-10-CM

## 2024-12-12 PROCEDURE — 99213 OFFICE O/P EST LOW 20 MIN: CPT | Mod: 95 | Performed by: FAMILY MEDICINE

## 2024-12-12 PROCEDURE — G2211 COMPLEX E/M VISIT ADD ON: HCPCS | Mod: 95 | Performed by: FAMILY MEDICINE

## 2024-12-12 RX ORDER — ESCITALOPRAM OXALATE 10 MG/1
10 TABLET ORAL DAILY
Qty: 90 TABLET | Refills: 1 | Status: SHIPPED | OUTPATIENT
Start: 2024-12-12

## 2024-12-12 ASSESSMENT — ENCOUNTER SYMPTOMS: NERVOUS/ANXIOUS: 1

## 2024-12-12 NOTE — PROGRESS NOTES
Amanda is a 49 year old who is being evaluated via a billable video visit.      {If patient encounters technical issues they should call [phone] :[id number]}    {PROVIDER CHARTING PREFERENCE:088240}    Subjective   Amanda is a 49 year old, presenting for the following health issues:  Anxiety        9/27/2024     6:57 AM   Additional Questions   Roomed by Lou Farah    History of Present Illness       Mental Health Follow-up:  Patient presents to follow-up on Depression & Anxiety.Patient's depression since last visit has been:  Better  The patient is not having other symptoms associated with depression.  Patient's anxiety since last visit has been:  Better  The patient is not having other symptoms associated with anxiety.  Any significant life events: No  Patient is not feeling anxious or having panic attacks.  Patient has no concerns about alcohol or drug use.    She eats 2-3 servings of fruits and vegetables daily.She consumes 0 sweetened beverage(s) daily.She exercises with enough effort to increase her heart rate 20 to 29 minutes per day.  She exercises with enough effort to increase her heart rate 3 or less days per week.   She is taking medications regularly.     Generally, feels much better.  Pt has been a little queasy if she takes it at a strange time.  Better if she takes it after eating.  No other side effects.      Took hydroxyzine once for panic.  That seemed to help when she needed it.  Hasn't had any more panic attacks besides that.  Also more optimistic.            1/17/2020    12:40 PM 9/27/2024     7:11 AM 12/11/2024     5:05 PM   MISAEL-7 SCORE   Total Score 5 (mild anxiety)  4 (minimal anxiety)   Total Score 5 9 4        Patient-reported           1/14/2020     8:20 PM 1/17/2020    12:40 PM 9/27/2024     7:11 AM   PHQ   PHQ-9 Total Score 0 0 6   Q9: Thoughts of better off dead/self-harm past 2 weeks Not at all  Not at all  Not at all       Patient-reported       Still having back pain, but  responding to meloxicam.  Trying to avoid taking it daily, but does take it at least 4 days/week most of the time.                  Objective           Vitals:  No vitals were obtained today due to virtual visit.    Physical Exam   GENERAL: alert and no distress  EYES: Eyes grossly normal to inspection.  No discharge or erythema, or obvious scleral/conjunctival abnormalities.  RESP: No audible wheeze, cough, or visible cyanosis.    SKIN: Visible skin clear. No significant rash, abnormal pigmentation or lesions.  NEURO: Cranial nerves grossly intact.  Mentation and speech appropriate for age.  PSYCH: Appropriate affect, tone, and pace of words    Office Visit on 09/27/2024   Component Date Value Ref Range Status    Sodium 09/27/2024 139  135 - 145 mmol/L Final    Potassium 09/27/2024 4.4  3.4 - 5.3 mmol/L Final    Carbon Dioxide (CO2) 09/27/2024 24  22 - 29 mmol/L Final    Anion Gap 09/27/2024 10  7 - 15 mmol/L Final    Urea Nitrogen 09/27/2024 14.7  6.0 - 20.0 mg/dL Final    Creatinine 09/27/2024 1.04 (H)  0.51 - 0.95 mg/dL Final    GFR Estimate 09/27/2024 66  >60 mL/min/1.73m2 Final    eGFR calculated using 2021 CKD-EPI equation.    Calcium 09/27/2024 9.0  8.8 - 10.4 mg/dL Final    Reference intervals for this test were updated on 7/16/2024 to reflect our healthy population more accurately. There may be differences in the flagging of prior results with similar values performed with this method. Those prior results can be interpreted in the context of the updated reference intervals.    Chloride 09/27/2024 105  98 - 107 mmol/L Final    Glucose 09/27/2024 99  70 - 99 mg/dL Final    Alkaline Phosphatase 09/27/2024 70  40 - 150 U/L Final    AST 09/27/2024 16  0 - 45 U/L Final    ALT 09/27/2024 6  0 - 50 U/L Final    Protein Total 09/27/2024 7.1  6.4 - 8.3 g/dL Final    Albumin 09/27/2024 4.1  3.5 - 5.2 g/dL Final    Bilirubin Total 09/27/2024 0.4  <=1.2 mg/dL Final    Patient Fasting > 8hrs? 09/27/2024 Yes   Final     WBC Count 09/27/2024 8.8  4.0 - 11.0 10e3/uL Final    RBC Count 09/27/2024 4.57  3.80 - 5.20 10e6/uL Final    Hemoglobin 09/27/2024 14.4  11.7 - 15.7 g/dL Final    Hematocrit 09/27/2024 41.4  35.0 - 47.0 % Final    MCV 09/27/2024 91  78 - 100 fL Final    MCH 09/27/2024 31.5  26.5 - 33.0 pg Final    MCHC 09/27/2024 34.8  31.5 - 36.5 g/dL Final    RDW 09/27/2024 12.2  10.0 - 15.0 % Final    Platelet Count 09/27/2024 310  150 - 450 10e3/uL Final    TSH 09/27/2024 2.74  0.30 - 4.20 uIU/mL Final    Cholesterol 09/27/2024 179  <200 mg/dL Final    Triglycerides 09/27/2024 69  <150 mg/dL Final    Direct Measure HDL 09/27/2024 57  >=50 mg/dL Final    LDL Cholesterol Calculated 09/27/2024 108 (H)  <100 mg/dL Final    Non HDL Cholesterol 09/27/2024 122  <130 mg/dL Final    Patient Fasting > 8hrs? 09/27/2024 Yes   Final     No results found for any visits on 12/12/24.  No results found for this or any previous visit (from the past 24 hours).      Video-Visit Details    Type of service:  Video Visit   Originating Location (pt. Location): Other Work  {PROVIDER LOCATION On-site should be selected for visits conducted from your clinic location or adjoining [facility] hospital, academic office, or other nearby [facility] building. Off-site should be selected for all other provider locations, including home:[id number]}  Distant Location (provider location):  On-site  Platform used for Video Visit: Nasra  Signed Electronically by: Sonny Warner MD, MD  {Email feedback regarding this note to [email or web address]   :[id number]}

## 2024-12-14 NOTE — PATIENT INSTRUCTIONS
I am glad that things are going well for you.  Let me know if any problems.    Consider updating your immunizations.    Follow-up in 6 months.    Contact us or return if questions or concerns.    Have a nice day!    Dr. Warner

## 2025-02-08 ENCOUNTER — HOSPITAL ENCOUNTER (EMERGENCY)
Facility: CLINIC | Age: 50
Discharge: HOME | End: 2025-02-08
Attending: FAMILY MEDICINE | Admitting: FAMILY MEDICINE
Payer: COMMERCIAL

## 2025-02-08 VITALS
HEIGHT: 66 IN | WEIGHT: 215 LBS | TEMPERATURE: 98 F | SYSTOLIC BLOOD PRESSURE: 125 MMHG | DIASTOLIC BLOOD PRESSURE: 78 MMHG | RESPIRATION RATE: 18 BRPM | OXYGEN SATURATION: 99 % | HEART RATE: 76 BPM | BODY MASS INDEX: 34.55 KG/M2

## 2025-02-08 DIAGNOSIS — G89.29 ACUTE EXACERBATION OF CHRONIC LOW BACK PAIN: ICD-10-CM

## 2025-02-08 DIAGNOSIS — M54.50 ACUTE EXACERBATION OF CHRONIC LOW BACK PAIN: ICD-10-CM

## 2025-02-08 PROCEDURE — 250N000011 HC RX IP 250 OP 636: Performed by: FAMILY MEDICINE

## 2025-02-08 PROCEDURE — 250N000013 HC RX MED GY IP 250 OP 250 PS 637: Performed by: FAMILY MEDICINE

## 2025-02-08 PROCEDURE — 99284 EMERGENCY DEPT VISIT MOD MDM: CPT

## 2025-02-08 PROCEDURE — 99284 EMERGENCY DEPT VISIT MOD MDM: CPT | Performed by: FAMILY MEDICINE

## 2025-02-08 PROCEDURE — 96372 THER/PROPH/DIAG INJ SC/IM: CPT | Performed by: FAMILY MEDICINE

## 2025-02-08 RX ORDER — CYCLOBENZAPRINE HCL 10 MG
10 TABLET ORAL 3 TIMES DAILY PRN
Qty: 15 TABLET | Refills: 0 | Status: SHIPPED | OUTPATIENT
Start: 2025-02-08 | End: 2025-02-13

## 2025-02-08 RX ORDER — OXYCODONE HYDROCHLORIDE 5 MG/1
5 TABLET ORAL ONCE
Status: COMPLETED | OUTPATIENT
Start: 2025-02-08 | End: 2025-02-08

## 2025-02-08 RX ORDER — LIDOCAINE 4 G/G
1 PATCH TOPICAL ONCE
Status: DISCONTINUED | OUTPATIENT
Start: 2025-02-08 | End: 2025-02-09 | Stop reason: HOSPADM

## 2025-02-08 RX ORDER — OXYCODONE HYDROCHLORIDE 5 MG/1
5 TABLET ORAL EVERY 6 HOURS PRN
Qty: 12 TABLET | Refills: 0 | Status: SHIPPED | OUTPATIENT
Start: 2025-02-08 | End: 2025-02-13

## 2025-02-08 RX ORDER — KETOROLAC TROMETHAMINE 30 MG/ML
60 INJECTION, SOLUTION INTRAMUSCULAR; INTRAVENOUS ONCE
Status: COMPLETED | OUTPATIENT
Start: 2025-02-08 | End: 2025-02-08

## 2025-02-08 RX ORDER — ORPHENADRINE CITRATE 30 MG/ML
60 INJECTION INTRAMUSCULAR; INTRAVENOUS ONCE
Status: COMPLETED | OUTPATIENT
Start: 2025-02-08 | End: 2025-02-08

## 2025-02-08 RX ADMIN — HYDROMORPHONE HYDROCHLORIDE 1 MG: 1 INJECTION, SOLUTION INTRAMUSCULAR; INTRAVENOUS; SUBCUTANEOUS at 20:35

## 2025-02-08 RX ADMIN — OXYCODONE HYDROCHLORIDE 5 MG: 5 TABLET ORAL at 21:22

## 2025-02-08 RX ADMIN — LIDOCAINE 1 PATCH: 4 PATCH TOPICAL at 21:22

## 2025-02-08 RX ADMIN — KETOROLAC TROMETHAMINE 60 MG: 30 INJECTION, SOLUTION INTRAMUSCULAR at 19:44

## 2025-02-08 RX ADMIN — ORPHENADRINE CITRATE 60 MG: 60 INJECTION INTRAMUSCULAR; INTRAVENOUS at 19:48

## 2025-02-08 ASSESSMENT — COLUMBIA-SUICIDE SEVERITY RATING SCALE - C-SSRS
2. HAVE YOU ACTUALLY HAD ANY THOUGHTS OF KILLING YOURSELF IN THE PAST MONTH?: NO
6. HAVE YOU EVER DONE ANYTHING, STARTED TO DO ANYTHING, OR PREPARED TO DO ANYTHING TO END YOUR LIFE?: NO
1. IN THE PAST MONTH, HAVE YOU WISHED YOU WERE DEAD OR WISHED YOU COULD GO TO SLEEP AND NOT WAKE UP?: NO

## 2025-02-08 ASSESSMENT — ACTIVITIES OF DAILY LIVING (ADL)
ADLS_ACUITY_SCORE: 49

## 2025-02-09 NOTE — DISCHARGE INSTRUCTIONS
We are glad that your pain is somewhat better.  You received Toradol, Norflex, Dilaudid and oxycodone.  You can use the lidocaine patch for up to 12 hours a day.  Continue with your daily meloxicam.  Reserve oxycodone for severe pain.  Take Flexeril up to 3 times a day as needed for pain/spasms.  You can also just reserve taking 2 of these tablets at nighttime.  Once your pain is better, consider resuming some of your physical therapy exercises, as well as working on your core muscles.  Follow-up with Dr. Warner in 3-5 days if not improving.

## 2025-02-09 NOTE — ED PROVIDER NOTES
[facility] ED Provider Note   Patient: Amanda Pittman  MRN #:  [id number]  Date of Visit: 2025    CC:     Chief Complaint   Patient presents with    Back Pain     HPI:  Amanda Pittman is a 49 year old female with history of hypothyroidism, and intermittent back pain who presented to the emergency department with acute onset of low back pain that started this afternoon around 130-2:00 PM.  Patient was letting her dogs back into the house when she felt a sharp stabbing pain in the low back.  Pain is located in a low region of the back just above the buttocks.  Pain radiates across the center but with slight preference to the left side.  She has slight pain into the front of the thighs but no radiation past the knees.  She has not had any bowel or bladder symptoms, numbness, tingling or weakness to the lower extremities.  Her last flareup of back pain was 5 years ago.  She was try to get out of a chair when she fell to the ground and had back spasms.  She had come in by ambulance.  The took several days before her pain started to get better.  She would have some intermittent discomfort.  She works as an , at a desk.  She does not have any kind of physical program or activity.  She went to physical therapy several years ago.  Patient took her meloxicam this morning as her preventative medication.  After her back got tweaked, she took it 3 Flexeril tablets.  She is not taking any acetaminophen.  Current pain level at rest is 6 out of 10.    Problem List:  Patient Active Problem List    Diagnosis Date Noted    Episodic mood disorder 2024     Priority: Medium    Intractable back pain 2020     Priority: Medium    Back pain 2020     Priority: Medium    CMC (carpometacarpal joint) sprains, right, initial encounter 2019     Priority: Medium    Status post  delivery 2014     Priority: Medium      Diagnosis updated by automated process. Provider to review and confirm.      History of cone biopsy of cervix complicating pregnancy 05/10/2014     Priority: Medium    S/P LEEP (loop electrosurgical excision procedure) 02/05/2012     Priority: Medium     1/8/02 pap ASCUS neg. HPV  4/9/02 pap-benign cellular changes  2/21/03 pap NIL  11/5/03 pap NIL  1/18/06 pap ASC-H. + HPV  3/16/06 colposcopy- bx = SANDY 3/CIS with gland involvement  4/7/06 CKC and endometrial currettings- SANDY 2/3 with negative margins. Negative embx.  7/12/06 NIL  10/11/06 NIL  4/11/07 NIL, 11/13/07 NIL, 12/3/08 NIL, 12/8/09 NIL  3/22/11 NIL  5/2/12 NIL  5/9/14 NIL pap, neg HR HPV. Plan: cotest in 1 yr  5/9/19 NIL pap, neg HR HPV. Plan: Cotest in 3 years.  5/13/22 NIL Pap, Neg HR HPV, also showing endometrial cells. No LMP recorded.  5/23/22 Tele enc. Clinic staff contacted patient. Pt is having periods. Provider states that endo cells are then normal finding.             CARDIOVASCULAR SCREENING; LDL GOAL LESS THAN 160 10/31/2010     Priority: Medium    Female infertility 06/24/2009     Priority: Medium     Record updated by IMO load, please review for accuracy.      Infertility management 05/24/2009     Priority: Medium    Hypothyroidism 09/12/2007     Priority: Medium     Problem list name updated by automated process. Provider to review         Past Medical History:   Diagnosis Date    Cancer (H) 2006    Screening for malignant neoplasm of the cervix 2006    Thyroid disease        MEDS: cyclobenzaprine (FLEXERIL) 10 MG tablet  cyclobenzaprine (FLEXERIL) 10 MG tablet  oxyCODONE (ROXICODONE) 5 MG tablet  amitriptyline (ELAVIL) 10 MG tablet  aspirin-acetaminophen-caffeine (EXCEDRIN MIGRAINE) 250-250-65 MG tablet  escitalopram (LEXAPRO) 10 MG tablet  hydrOXYzine yady (VISTARIL) 25 MG capsule  levothyroxine (SYNTHROID/LEVOTHROID) 100 MCG tablet  meloxicam (MOBIC) 15 MG tablet        ALLERGIES:    Allergies   Allergen Reactions    Strawberry Extract  "Other (See Comments)     Sores in mouth    Percocet [Oxycodone-Acetaminophen] Other (See Comments)     Tongue swelling and hives       Past Surgical History:   Procedure Laterality Date    BIOPSY  2006    Colposcopy     SECTION N/A 2014    Procedure:  SECTION;  Surgeon: Max Merida MD;  Location: [facility] L[facility]    COLONOSCOPY      COLONOSCOPY N/A 2022    Procedure: COLONOSCOPY;  Surgeon: Erick Chopra MD;  Location: [facility] GI    HC COLP CERVIX/UPPER VAGINA W BX CERVIX/ENDOCERV CURETT  2006    SANDY 2/3, CIS     HC REMOVE TONSILS/ADENOIDS,12+ Y/O  1997    Tonsils 12+y.o. tonsilectomy    LASIK Bilateral     [facility], formerly nearsighted    LEEP TX, CERVICAL  2006    TONSILLECTOMY  2008    Right - sided    ZZC  DELIVERY ONLY  2003    , Low Cervical    ZZHC MARSUP BARTHOLIN GLAND CYST      x4       Social History     Tobacco Use    Smoking status: Never    Smokeless tobacco: Never    Tobacco comments:     used to smoke occ   Vaping Use    Vaping status: Never Used   Substance Use Topics    Alcohol use: No    Drug use: No         Review of Systems   Except as noted in HPI, all other systems were reviewed and are negative    Physical Exam   Vitals were reviewed  Patient Vitals for the past 12 hrs:   BP Temp Temp src Pulse Resp SpO2 Height Weight   25 1922 138/83 98  F (36.7  C) Temporal 72 18 100 % 1.676 m (5' 6\") 97.5 kg (215 lb)     GENERAL APPEARANCE: Alert, moderate distress due to pain; patient is sitting in bed with her legs supported by blankets underneath.  FACE: normal facies  HENT: normal external exam  RESP: normal respiratory effort; clear breath sounds bilaterally  CV: regular rate and rhythm; no significant murmurs, gallops or rubs  EXT: No calf tenderness or pitting edema  SKIN: no worrisome rash  NEURO: no facial droop; no focal deficits, speech is normal        Available Lab/Imaging Results   No results found for this or any " previous visit (from the past 24 hours).             Impression     Final diagnoses:   Acute exacerbation of chronic low back pain         ED Course & Medical Decision Making   Amanda Pittman is a 49 year old female with history of hypothyroidism, intermittent low back pain, who presented to the emergency department with acute exacerbation of low back pain.  Patient states that she did not have any recent accident or injury.  She was letting her dogs and to the house around 130 this afternoon when she tweaked her back and felt a sharp stabbing pain across the low back.  Pain seem to be a little bit worse on the left side.  Pain radiated to the anterior thigh on the left side and slightly to the right.  She has no pain radiating below the knees.  This pain is similar to what she had 5 years ago when she had a flareup.  Her medical records were reviewed.  She had an MRI scan in November 2021 showing mild lumbar spondylosis with greatest disc degeneration at L4-L5 similar to previous findings.  There is no high-grade stenosis or focal neural impingement.  She had a mild annular bulge with shallow central disc protrusion at that level.  Patient takes meloxicam daily and took her dose this morning.  When she tweaked her back, she took 2 Flexeril tablets right away followed by an additional Flexeril tablet a couple of hours later.  She has not noticed any significant relief.  Pain seems to be worse when she is moving from position to position.  She is currently able to sit with her legs bent at the knee and slightly askew with pain level of 6 out of 10.  She does not have any bowel or bladder symptoms and has no numbness, tingling or weakness into the lower extremities.    Vital signs are stable with temperature of 98 degrees, blood pressure 138/83, heart rate of 72, respiration 18, 100% oxygen saturation.  Back exam is difficult to accomplish with her current pain level.  She locates her pain to the low back region in the  center just above the buttock area.  She has normal deep tendon reflexes, sensory and motor function in the lower extremities.    8:30 PM: Patient's pain has not improved much with initial Toradol and Norflex.  Patient states that she had an allergic reaction to Percocet 25 years ago.  She has since had separately acetaminophen and oxycodone without any reactions.    9:30 PM: Patient's  is in the room.  Patient does have a certain level of pain every day.  She just has not had a flareup like this for 5 years.  She still has some physical therapy exercises from years ago.  I recommended working on her core muscles with back and abdominal muscles such as through Pilates or yoga.  Introduced some of the exercises from her physical therapy from years ago.  Try to strengthen these muscles to help with her chronic pain.  Patient was given an oxycodone tablet to make sure she did not have a reaction.  She tolerated this medication well.  She has had this before for her hysterectomy surgery.  Patient expressed understanding and agreement with discharge instructions below.      Written after-visit summary and instructions were given at the time of discharge.    Follow up Plan:   Sonny Warner MD  [address] [place] [id number]  [phone]    In 3 days  if not improving      Discharge Instructions:   We are glad that your pain is somewhat better.  You received Toradol, Norflex, Dilaudid and oxycodone.  You can use the lidocaine patch for up to 12 hours a day.  Continue with your daily meloxicam.  Reserve oxycodone for severe pain.  Take Flexeril up to 3 times a day as needed for pain/spasms.  You can also just reserve taking 2 of these tablets at nighttime.  Once your pain is better, consider resuming some of your physical therapy exercises, as well as working on your core muscles.  Follow-up with Dr. Warner in 3-5 days if not improving.       Disclaimer: This note consists of words and symbols derived  from keyboarding and dictation using voice recognition software.  As a result, there may be errors that have gone undetected.  Please consider this when interpreting information found in this note.       Ab Troy MD  02/08/25 [id number]

## 2025-02-09 NOTE — ED TRIAGE NOTES
Exacerbation of chronic back pain but today was letting her dogs in and has sharp stabbing lower back pain. Has taken flexeril for pain and not effective.      Triage Assessment (Adult)       Row Name 02/08/25 1920          Triage Assessment    Airway WDL WDL        Respiratory WDL    Respiratory WDL WDL        Cardiac WDL    Cardiac WDL WDL

## 2025-02-12 ENCOUNTER — MYC REFILL (OUTPATIENT)
Dept: FAMILY MEDICINE | Facility: CLINIC | Age: 50
End: 2025-02-12
Payer: COMMERCIAL

## 2025-02-12 RX ORDER — OXYCODONE HYDROCHLORIDE 5 MG/1
5 TABLET ORAL EVERY 6 HOURS PRN
Qty: 12 TABLET | Refills: 0 | OUTPATIENT
Start: 2025-02-12

## 2025-02-12 NOTE — TELEPHONE ENCOUNTER
This is a controlled substance and requires a face to face visit to prescribe.  If she feels she needs this still for her back pain, will need to schedule with me.  OK to use a rounding or virtual spot (for F2F visit).

## 2025-02-13 ENCOUNTER — OFFICE VISIT (OUTPATIENT)
Dept: FAMILY MEDICINE | Facility: OTHER | Age: 50
End: 2025-02-13
Payer: COMMERCIAL

## 2025-02-13 ENCOUNTER — ANCILLARY PROCEDURE (OUTPATIENT)
Dept: GENERAL RADIOLOGY | Facility: OTHER | Age: 50
End: 2025-02-13
Attending: FAMILY MEDICINE
Payer: COMMERCIAL

## 2025-02-13 VITALS
DIASTOLIC BLOOD PRESSURE: 80 MMHG | BODY MASS INDEX: 37.61 KG/M2 | WEIGHT: 234 LBS | HEART RATE: 74 BPM | SYSTOLIC BLOOD PRESSURE: 122 MMHG | TEMPERATURE: 97.2 F | OXYGEN SATURATION: 100 % | RESPIRATION RATE: 18 BRPM | HEIGHT: 66 IN

## 2025-02-13 DIAGNOSIS — G89.29 CHRONIC MIDLINE LOW BACK PAIN WITHOUT SCIATICA: ICD-10-CM

## 2025-02-13 DIAGNOSIS — M62.830 BACK MUSCLE SPASM: ICD-10-CM

## 2025-02-13 DIAGNOSIS — M54.9 INTRACTABLE BACK PAIN: ICD-10-CM

## 2025-02-13 DIAGNOSIS — M54.9 INTRACTABLE BACK PAIN: Primary | ICD-10-CM

## 2025-02-13 DIAGNOSIS — M54.50 CHRONIC MIDLINE LOW BACK PAIN WITHOUT SCIATICA: ICD-10-CM

## 2025-02-13 RX ORDER — CYCLOBENZAPRINE HCL 10 MG
10 TABLET ORAL 3 TIMES DAILY PRN
Qty: 90 TABLET | Refills: 1 | Status: SHIPPED | OUTPATIENT
Start: 2025-02-13

## 2025-02-13 RX ORDER — DULOXETIN HYDROCHLORIDE 20 MG/1
20 CAPSULE, DELAYED RELEASE ORAL 2 TIMES DAILY
Qty: 60 CAPSULE | Refills: 1 | Status: SHIPPED | OUTPATIENT
Start: 2025-02-13

## 2025-02-13 RX ORDER — OXYCODONE HYDROCHLORIDE 5 MG/1
5 TABLET ORAL EVERY 6 HOURS PRN
Qty: 12 TABLET | Refills: 0 | Status: SHIPPED | OUTPATIENT
Start: 2025-02-13

## 2025-02-13 ASSESSMENT — PAIN SCALES - GENERAL: PAINLEVEL_OUTOF10: SEVERE PAIN (7)

## 2025-02-13 NOTE — PATIENT INSTRUCTIONS
Let's see what your X-ray shows.    OK to start duloxetine to help with pain.  If not getting any benefit after 2-4 weeks, then we should stop it.      OK to continue meloxicam and flexeril as needed.  Lidocaine patches are OK.      Oxycodone should only be used if others aren't working.  Long-term goal is to not need this at all.      I've also put in a referral to spine management to see if injection or other intervention might be helpful.      I'd encourage you to update your immunizations.        Contact us or return if questions or concerns.    Have a nice day!    Dr. Warner

## 2025-02-13 NOTE — PROGRESS NOTES
Assessment & Plan       ICD-10-CM    1. Intractable back pain  M54.9 XR Lumbar Spine 2/3 Views     Spine  Referral     DULoxetine (CYMBALTA) 20 MG capsule     oxyCODONE (ROXICODONE) 5 MG tablet     cyclobenzaprine (FLEXERIL) 10 MG tablet      2. Chronic midline low back pain without sciatica  M54.50 XR Lumbar Spine 2/3 Views    G89.29 Spine  Referral     DULoxetine (CYMBALTA) 20 MG capsule     oxyCODONE (ROXICODONE) 5 MG tablet      3. Back muscle spasm  M62.830 cyclobenzaprine (FLEXERIL) 10 MG tablet           Reviewed her emergency room visit.  Also reviewed her past lumbar MRI and back x-ray.  Discussed options with patient.  Will proceed with repeat lumbar spine films as she has clear acute worsening of her symptoms and previous MRI did not show anything worrisome.  Will ensure that she does not have any  fractures.  Will do a trial of duloxetine to see if that helps to reduce her pain as well.  I did refill her oxycodone, but stressed that this needs to be a short-term treatment plan.  Also refilled her Flexeril.  Finally, will refer to spine to see if she would be a candidate for injections.  Follow-up as needed.        Portions of this note were completed using Dragon dictation software.  Although reviewed, there may be typographical and other inadvertent errors that remain.           Patient Instructions   Let's see what your X-ray shows.    OK to start duloxetine to help with pain.  If not getting any benefit after 2-4 weeks, then we should stop it.      OK to continue meloxicam and flexeril as needed.  Lidocaine patches are OK.      Oxycodone should only be used if others aren't working.  Long-term goal is to not need this at all.      I've also put in a referral to spine management to see if injection or other intervention might be helpful.      I'd encourage you to update your immunizations.        Contact us or return if questions or concerns.    Have a nice day!      "Timmy Patel is a 49 year old, presenting for the following health issues:  Recheck Medication        2/13/2025    10:15 AM   Additional Questions   Roomed by Dodie       History of Present Illness       Back Pain:  She presents for follow up of back pain. Patient's back pain is a recurring problem.  Location of back pain:  Right lower back and left lower back  Description of back pain: sharp  Back pain spreads: right thigh and left thigh    Since patient first noticed back pain, pain is: always present, but gets better and worse  Does back pain interfere with her job:  Yes       She eats 2-3 servings of fruits and vegetables daily.She consumes 0 sweetened beverage(s) daily.She exercises with enough effort to increase her heart rate 9 or less minutes per day.  She exercises with enough effort to increase her heart rate 3 or less days per week.   She is taking medications regularly.    ED/UC Followup:    Facility:  [facility]   Date of visit: 2-8-25  Reason for visit: Acute exacerbation of low back pain   Current Status: currently feeling better than in the ER.     Pt had exacerbation of her back pain on Saturday around 1:30 PM.      She bent down to let her dog in and take the lead off.  Had sudden burst of low back pain.  Mostly in the lowest part of her back, radiating across.  Does radiate into both thighs just a bit.  Ended up in the ER.  They didn't find much wrong.  Treated with Toradol, then hydromorphone and orphenadrine.  Was given Rx for oxycodone, lidocaine patch, and flexeril.      Since leaving the hospital, has had some improvement.  Has been able to walk.  She rested Sunday, seemed to be getting better, but worsened yesterday.  She rested again.  Today, seems a bit better.  Pain is in the same location, but not quite as severe.  Less spasms, less \"biting\" sensation.      Has done PT in the past.  Didn't seem to heal anything, but learned ways to help prevent this from worsening as " "much.      Has been having pain essentially daily, so has been taking meloxicam daily for the last few months.                Objective    /80   Pulse 74   Temp 97.2  F (36.2  C) (Temporal)   Resp 18   Ht 1.665 m (5' 5.55\")   Wt 106.1 kg (234 lb)   LMP 01/16/2025   SpO2 100%   BMI 38.29 kg/m    Body mass index is 38.29 kg/m .  Physical Exam   GENERAL: alert and no distress  NECK: no adenopathy, no asymmetry, masses, or scars  RESP: lungs clear to auscultation - no rales, rhonchi or wheezes  CV: regular rate and rhythm, normal S1 S2, no S3 or S4, no murmur, click or rub, no peripheral edema  ABDOMEN: soft, nontender, no hepatosplenomegaly, no masses and bowel sounds normal  MS: midline lumbar spine tenderness.  Mild paralumbar spasms.  Normal strength and sensation in lower extremities.      Office Visit on 09/27/2024   Component Date Value Ref Range Status    Sodium 09/27/2024 139  135 - 145 mmol/L Final    Potassium 09/27/2024 4.4  3.4 - 5.3 mmol/L Final    Carbon Dioxide (CO2) 09/27/2024 24  22 - 29 mmol/L Final    Anion Gap 09/27/2024 10  7 - 15 mmol/L Final    Urea Nitrogen 09/27/2024 14.7  6.0 - 20.0 mg/dL Final    Creatinine 09/27/2024 1.04 (H)  0.51 - 0.95 mg/dL Final    GFR Estimate 09/27/2024 66  >60 mL/min/1.73m2 Final    eGFR calculated using 2021 CKD-EPI equation.    Calcium 09/27/2024 9.0  8.8 - 10.4 mg/dL Final    Reference intervals for this test were updated on 7/16/2024 to reflect our healthy population more accurately. There may be differences in the flagging of prior results with similar values performed with this method. Those prior results can be interpreted in the context of the updated reference intervals.    Chloride 09/27/2024 105  98 - 107 mmol/L Final    Glucose 09/27/2024 99  70 - 99 mg/dL Final    Alkaline Phosphatase 09/27/2024 70  40 - 150 U/L Final    AST 09/27/2024 16  0 - 45 U/L Final    ALT 09/27/2024 6  0 - 50 U/L Final    Protein Total 09/27/2024 7.1  6.4 - 8.3 g/dL " Final    Albumin 09/27/2024 4.1  3.5 - 5.2 g/dL Final    Bilirubin Total 09/27/2024 0.4  <=1.2 mg/dL Final    Patient Fasting > 8hrs? 09/27/2024 Yes   Final    WBC Count 09/27/2024 8.8  4.0 - 11.0 10e3/uL Final    RBC Count 09/27/2024 4.57  3.80 - 5.20 10e6/uL Final    Hemoglobin 09/27/2024 14.4  11.7 - 15.7 g/dL Final    Hematocrit 09/27/2024 41.4  35.0 - 47.0 % Final    MCV 09/27/2024 91  78 - 100 fL Final    MCH 09/27/2024 31.5  26.5 - 33.0 pg Final    MCHC 09/27/2024 34.8  31.5 - 36.5 g/dL Final    RDW 09/27/2024 12.2  10.0 - 15.0 % Final    Platelet Count 09/27/2024 310  150 - 450 10e3/uL Final    TSH 09/27/2024 2.74  0.30 - 4.20 uIU/mL Final    Cholesterol 09/27/2024 179  <200 mg/dL Final    Triglycerides 09/27/2024 69  <150 mg/dL Final    Direct Measure HDL 09/27/2024 57  >=50 mg/dL Final    LDL Cholesterol Calculated 09/27/2024 108 (H)  <100 mg/dL Final    Non HDL Cholesterol 09/27/2024 122  <130 mg/dL Final    Patient Fasting > 8hrs? 09/27/2024 Yes   Final     No results found for any visits on 02/13/25.  No results found for this or any previous visit (from the past 24 hours).        Signed Electronically by: Sonny Warner MD, MD

## 2025-02-19 NOTE — CONFIDENTIAL NOTE
NEUROSURGERY - NEW PREVISIT PLANNING    Referring Provider:   Sonny Warner MD      OVN 2/13/2025   Reason For Visit: M54.9 (ICD-10-CM) - Intractable back pain   M54.50, G89.29 (ICD-10-CM) - Chronic midline low back pain without sciatica          IMAGING STATUS/LOCATION DATE/TYPE   MRI PACS 11/16/2021  Lumbar  [facility]   CT PACS 11/12/2015  Lumbar  [facility]   XRAY PACS 02/13/2025  Lumbar  [facility]   NOTES STATUS/LOCATION DATE/TYPE   Other specialist OVN: N/A    EMG N/A    INJECTION N/A    PHYSICAL THERAPY Encounters 2020 (coccyx)   SURGERY N/A

## 2025-02-28 ENCOUNTER — PRE VISIT (OUTPATIENT)
Dept: NEUROSURGERY | Facility: CLINIC | Age: 50
End: 2025-02-28

## 2025-04-14 DIAGNOSIS — M54.9 INTRACTABLE BACK PAIN: ICD-10-CM

## 2025-04-14 DIAGNOSIS — M54.50 CHRONIC MIDLINE LOW BACK PAIN WITHOUT SCIATICA: ICD-10-CM

## 2025-04-14 DIAGNOSIS — G89.29 CHRONIC MIDLINE LOW BACK PAIN WITHOUT SCIATICA: ICD-10-CM

## 2025-04-14 RX ORDER — DULOXETIN HYDROCHLORIDE 20 MG/1
20 CAPSULE, DELAYED RELEASE ORAL 2 TIMES DAILY
Qty: 60 CAPSULE | Refills: 1 | Status: SHIPPED | OUTPATIENT
Start: 2025-04-14

## 2025-05-06 SDOH — HEALTH STABILITY: PHYSICAL HEALTH: ON AVERAGE, HOW MANY MINUTES DO YOU ENGAGE IN EXERCISE AT THIS LEVEL?: 20 MIN

## 2025-05-06 SDOH — HEALTH STABILITY: PHYSICAL HEALTH: ON AVERAGE, HOW MANY DAYS PER WEEK DO YOU ENGAGE IN MODERATE TO STRENUOUS EXERCISE (LIKE A BRISK WALK)?: 2 DAYS

## 2025-05-06 ASSESSMENT — SOCIAL DETERMINANTS OF HEALTH (SDOH): HOW OFTEN DO YOU GET TOGETHER WITH FRIENDS OR RELATIVES?: ONCE A WEEK

## 2025-05-07 ENCOUNTER — OFFICE VISIT (OUTPATIENT)
Dept: FAMILY MEDICINE | Facility: OTHER | Age: 50
End: 2025-05-07
Payer: COMMERCIAL

## 2025-05-07 VITALS
SYSTOLIC BLOOD PRESSURE: 122 MMHG | WEIGHT: 235 LBS | RESPIRATION RATE: 17 BRPM | TEMPERATURE: 97.8 F | BODY MASS INDEX: 37.77 KG/M2 | OXYGEN SATURATION: 98 % | DIASTOLIC BLOOD PRESSURE: 71 MMHG | HEIGHT: 66 IN | HEART RATE: 69 BPM

## 2025-05-07 DIAGNOSIS — Z00.00 ROUTINE GENERAL MEDICAL EXAMINATION AT A HEALTH CARE FACILITY: Primary | ICD-10-CM

## 2025-05-07 DIAGNOSIS — G89.29 CHRONIC MIDLINE LOW BACK PAIN WITHOUT SCIATICA: ICD-10-CM

## 2025-05-07 DIAGNOSIS — E03.9 HYPOTHYROIDISM, UNSPECIFIED TYPE: ICD-10-CM

## 2025-05-07 DIAGNOSIS — Z12.4 CERVICAL CANCER SCREENING: ICD-10-CM

## 2025-05-07 DIAGNOSIS — H93.13 TINNITUS, BILATERAL: ICD-10-CM

## 2025-05-07 DIAGNOSIS — F41.9 ANXIETY: ICD-10-CM

## 2025-05-07 DIAGNOSIS — M54.50 CHRONIC MIDLINE LOW BACK PAIN WITHOUT SCIATICA: ICD-10-CM

## 2025-05-07 RX ORDER — ESCITALOPRAM OXALATE 10 MG/1
10 TABLET ORAL DAILY
Qty: 90 TABLET | Refills: 1 | Status: SHIPPED | OUTPATIENT
Start: 2025-05-07

## 2025-05-07 ASSESSMENT — PAIN SCALES - GENERAL: PAINLEVEL_OUTOF10: NO PAIN (0)

## 2025-05-07 NOTE — PROGRESS NOTES
Preventive Care Visit  [facility]  Sonny Warner MD, MD, Family Medicine  May 7, 2025      Assessment & Plan     ASSESSMENT/ORDERS:    ICD-10-CM    1. Routine general medical examination at a health care facility  Z00.00 HPV and Gynecologic Cytology Panel - Recommended Age 30 - 65 Years     MA Screen Bilateral w/Stu     TSH with free T4 reflex     Lipid panel reflex to direct LDL Fasting     Comprehensive metabolic panel (BMP + Alb, Alk Phos, ALT, AST, Total. Bili, TP)     CBC with platelets      2. Chronic midline low back pain without sciatica  M54.50     G89.29       3. Anxiety  F41.9 escitalopram (LEXAPRO) 10 MG tablet      4. Hypothyroidism, unspecified type  E03.9 TSH with free T4 reflex      5. Tinnitus, bilateral  H93.13       6. Cervical cancer screening  Z12.4 HPV and Gynecologic Cytology Panel - Recommended Age 30 - 65 Years        PLAN:  Amanda Pittman is a 49 year old female with a history of hypothyroidism, anxiety, migraine headaches, and chronic back pain who presents today for annual preventative visit. Ordered mammogram for breast cancer screening and she will schedule this. Will check lipids, CMP, and CBC in the future when fasting, closer to one year after her previous lab draw 9/27/2024.  Patient's back pain has been well controlled since last exacerbation. We discussed decreasing duloxetine to once daily and if her pain remains controlled after two weeks she could consider discontinuation altogether. If her pain worsens she will reach out and resume her current dosage. She will also try to reduce her meloxicam use going forward. We discussed long term risks with this medication.  Anxiety is stable on escitalopram. This is renewed.  Hypothyroidism historically stable. Will recheck TSH with future labs as above and adjust levothyroxine as indicated.  Discussed OTC cares for tinnitus such as lipo-flavanoid, white noise stefanie. Can consider ENT referral if worsening or  "any hearing changes.  Patient has a history of LEEP, however most recent cervical cancer screenings have been normal. She is due for PAP/HPV and this was completed today. Will follow up with any abnormalities.  The longitudinal plan of care for the diagnosis(es)/condition(s) as documented were addressed during this visit. Due to the added complexity in care, I will continue to support Amanda in the subsequent management and with ongoing continuity of care.    BMI  Estimated body mass index is 37.41 kg/m  as calculated from the following:    Height as of this encounter: 1.688 m (5' 6.46\").    Weight as of this encounter: 106.6 kg (235 lb).   Weight management plan: Discussed healthy diet and exercise guidelines    Counseling  Appropriate preventive services were addressed with this patient via screening, questionnaire, or discussion as appropriate for fall prevention, nutrition, physical activity, Tobacco-use cessation, social engagement, weight loss and cognition.  Checklist reviewing preventive services available has been given to the patient.  Reviewed patient's diet, addressing concerns and/or questions.   She is at risk for lack of exercise and has been provided with information to increase physical activity for the benefit of her well-being.       Subjective   Amanda is a 49 year old, presenting for the following:  Physical        5/7/2025     6:57 AM   Additional Questions   Roomed by yudith   Accompanied by self          HPI  The patient reports good control of her back pain with her current regimen. She has been taking the meloxicam daily. She is interested in reducing duloxetine usage.     Anxiety stable on Lexapro.    The patient is interested in discussing any need for skin checks given her fair skin and history of sun exposure in her youth. She has one spot on her right hip that is large but otherwise does not bother her.    She notes nightly tinnitus. No hearing changes otherwise. No ear " pain.    Advance Care Planning    Discussed advance care planning with patient; however, patient declined at this time.        5/6/2025   General Health   How would you rate your overall physical health? (!) FAIR   Feel stress (tense, anxious, or unable to sleep) Only a little   (!) STRESS CONCERN      5/6/2025   Nutrition   Three or more servings of calcium each day? Yes   Diet: Regular (no restrictions)   How many servings of fruit and vegetables per day? (!) 2-3   How many sweetened beverages each day? 0-1         5/6/2025   Exercise   Days per week of moderate/strenous exercise 2 days   Average minutes spent exercising at this level 20 min   (!) EXERCISE CONCERN      5/6/2025   Social Factors   Frequency of gathering with friends or relatives Once a week   Worry food won't last until get money to buy more No   Food not last or not have enough money for food? No   Do you have housing? (Housing is defined as stable permanent housing and does not include staying outside in a car, in a tent, in an abandoned building, in an overnight shelter, or couch-surfing.) Yes   Are you worried about losing your housing? No   Lack of transportation? No   Unable to get utilities (heat,electricity)? No         5/6/2025   Fall Risk   Fallen 2 or more times in the past year? No   Trouble with walking or balance? No          5/6/2025   Dental   Dentist two times every year? Yes           Today's PHQ-2 Score:       2/13/2025     9:09 AM   PHQ-2 ( 1999 [employer])   Q1: Little interest or pleasure in doing things 1   Q2: Feeling down, depressed or hopeless 1   PHQ-2 Score 2    Q1: Little interest or pleasure in doing things Several days   Q2: Feeling down, depressed or hopeless Several days   PHQ-2 Score 2       Patient-reported         5/6/2025   Substance Use   Alcohol more than 3/day or more than 7/wk No   Do you use any other substances recreationally? No     Social History     Tobacco Use    Smoking status: Never    Smokeless tobacco:  Never    Tobacco comments:     used to smoke occ   Vaping Use    Vaping status: Never Used   Substance Use Topics    Alcohol use: No    Drug use: No           11/9/2023   LAST FHS-7 RESULTS   1st degree relative breast or ovarian cancer No   Any relative bilateral breast cancer No   Any male have breast cancer No   Any ONE woman have BOTH breast AND ovarian cancer No   Any woman with breast cancer before 50yrs No   2 or more relatives with breast AND/OR ovarian cancer No   2 or more relatives with breast AND/OR bowel cancer No        Mammogram Screening - Mammogram every 1-2 years updated in Health Maintenance based on mutual decision making        5/6/2025   STI Screening   New sexual partner(s) since last STI/HIV test? No     History of abnormal Pap smear: No - age 30- 64 PAP with HPV every 5 years recommended        Latest Ref Rng & Units 5/13/2022     7:53 AM 5/9/2019    11:44 AM 5/9/2019    11:43 AM   PAP / HPV   PAP  Negative for Intraepithelial Lesion or Malignancy (NILM)      PAP (Historical)   NIL     HPV 16 DNA Negative Negative   Negative    HPV 18 DNA Negative Negative   Negative    Other HR HPV Negative Negative   Negative      ASCVD Risk   The 10-year ASCVD risk score (Jannette MARTINEZ, et al., 2019) is: 0.9%    Values used to calculate the score:      Age: 49 years      Sex: Female      Is Non- : No      Diabetic: No      Tobacco smoker: No      Systolic Blood Pressure: 122 mmHg      Is BP treated: No      HDL Cholesterol: 57 mg/dL      Total Cholesterol: 179 mg/dL            5/6/2025   Contraception/Family Planning   Questions about contraception or family planning No        Reviewed and updated as needed this visit by Provider                    BP Readings from Last 3 Encounters:   05/07/25 122/71   02/28/25 114/72   02/13/25 122/80    Wt Readings from Last 3 Encounters:   05/07/25 106.6 kg (235 lb)   02/28/25 103 kg (227 lb)   02/13/25 106.1 kg (234 lb)                   Patient Active Problem List   Diagnosis    Hypothyroidism    Infertility management    Female infertility    CARDIOVASCULAR SCREENING; LDL GOAL LESS THAN 160    S/P LEEP (loop electrosurgical excision procedure)    History of cone biopsy of cervix complicating pregnancy    Status post  delivery    CMC (carpometacarpal joint) sprains, right, initial encounter    Intractable back pain    Back pain    Episodic mood disorder     Past Surgical History:   Procedure Laterality Date    BIOPSY      Colposcopy     SECTION N/A 2014    Procedure:  SECTION;  Surgeon: Max Merida MD;  Location: [facility] L[facility]    COLONOSCOPY      COLONOSCOPY N/A 2022    Procedure: COLONOSCOPY;  Surgeon: Erick Chopra MD;  Location: [facility] GI    HC COLP CERVIX/UPPER VAGINA W BX CERVIX/ENDOCERV CURETT  2006    SANDY 2/3, CIS     HC REMOVE TONSILS/ADENOIDS,12+ Y/O  1997    Tonsils 12+y.o. tonsilectomy    LASIK Bilateral     LasikPlus, formerly nearsighted    LEEP TX, CERVICAL  2006    TONSILLECTOMY  2008    Right - sided    ZZC  DELIVERY ONLY  2003    , Low Cervical    ZZHC MARSUP BARTHOLIN GLAND CYST      x4       Social History     Tobacco Use    Smoking status: Never    Smokeless tobacco: Never    Tobacco comments:     used to smoke occ   Substance Use Topics    Alcohol use: No     Family History   Problem Relation Age of Onset    Cancer Mother 72        myeloma    Other Cancer Mother         Multiple myeloma 2015    Heart Disease Father     Cancer Maternal Grandmother         uterus cancer    Heart Disease Maternal Grandfather     Heart Disease Paternal Grandfather          of MI    Glaucoma No family hx of     Macular Degeneration No family hx of          Current Outpatient Medications   Medication Sig Dispense Refill    amitriptyline (ELAVIL) 10 MG tablet Take 1-2 tablets (10-20 mg) by mouth at bedtime. 180 tablet 2    aspirin-acetaminophen-caffeine (EXCEDRIN  "MIGRAINE) 250-250-65 MG tablet Take 1 tablet by mouth every 6 hours as needed for headaches      cyclobenzaprine (FLEXERIL) 10 MG tablet Take 1 tablet (10 mg) by mouth 3 times daily as needed for muscle spasms. 90 tablet 1    DULoxetine (CYMBALTA) 20 MG capsule TAKE 1 CAPSULE (20 MG) BY MOUTH 2 TIMES DAILY. 60 capsule 1    escitalopram (LEXAPRO) 10 MG tablet Take 1 tablet (10 mg) by mouth daily. 90 tablet 1    hydrOXYzine yady (VISTARIL) 25 MG capsule Take 1 capsule (25 mg) by mouth 3 times daily as needed for anxiety. 30 capsule 1    levothyroxine (SYNTHROID/LEVOTHROID) 100 MCG tablet Take 1 tablet (100 mcg) by mouth daily. 90 tablet 3    meloxicam (MOBIC) 15 MG tablet TAKE 1 TABLET BY MOUTH ONCE  DAILY AS NEEDED FOR MODERATE  PAIN 90 tablet 3     Allergies   Allergen Reactions    Strawberry Extract Other (See Comments)     Sores in mouth    Percocet [Oxycodone-Acetaminophen] Other (See Comments)     Tongue swelling and hives     Recent Labs   Lab Test 09/27/24  0753 08/02/23  1534 09/19/22  1624 09/17/21  1655 06/16/20  1513 03/27/20  0558   * 87  --  77  --   --    HDL 57 67  --  58  --   --    TRIG 69 66  --  64  --   --    ALT 6 8 30  --   --   --    CR 1.04* 0.96* 0.93 0.95   < > 0.95   GFRESTIMATED 66 73 76 72   < > 72   GFRESTBLACK  --   --   --   --   --  84   POTASSIUM 4.4 4.3 4.3 4.0   < > 4.3   TSH 2.74 1.86 3.19 1.43   < >  --     < > = values in this interval not displayed.             Objective    Exam  /71   Pulse 69   Temp 97.8  F (36.6  C) (Temporal)   Resp 17   Ht 1.688 m (5' 6.46\")   Wt 106.6 kg (235 lb)   LMP 05/04/2025 (Exact Date)   SpO2 98%   BMI 37.41 kg/m     Estimated body mass index is 37.41 kg/m  as calculated from the following:    Height as of this encounter: 1.688 m (5' 6.46\").    Weight as of this encounter: 106.6 kg (235 lb).    Physical Exam  Vitals and nursing note reviewed. Exam conducted with a chaperone present.   Constitutional:       General: She is not in " acute distress.     Appearance: She is not ill-appearing or toxic-appearing.   HENT:      Head: Normocephalic.      Right Ear: Tympanic membrane, ear canal and external ear normal.      Left Ear: Tympanic membrane, ear canal and external ear normal.      Nose: Nose normal.      Mouth/Throat:      Mouth: Mucous membranes are moist.      Pharynx: Oropharynx is clear. No oropharyngeal exudate or posterior oropharyngeal erythema.   Eyes:      General: No scleral icterus.     Extraocular Movements: Extraocular movements intact.      Conjunctiva/sclera: Conjunctivae normal.      Pupils: Pupils are equal, round, and reactive to light.   Cardiovascular:      Rate and Rhythm: Normal rate and regular rhythm.      Pulses: Normal pulses.      Heart sounds: Normal heart sounds.   Pulmonary:      Effort: Pulmonary effort is normal.      Breath sounds: Normal breath sounds.   Abdominal:      General: Bowel sounds are normal.      Palpations: Abdomen is soft. There is no mass.      Tenderness: There is no abdominal tenderness. There is no right CVA tenderness, left CVA tenderness or guarding.   Genitourinary:     General: Normal vulva.      Pubic Area: No rash.       Labia:         Right: No lesion.         Left: No lesion.       Vagina: Normal.      Cervix: Normal. No cervical motion tenderness, discharge, lesion or cervical bleeding.      Uterus: Normal.       Adnexa: Right adnexa normal and left adnexa normal.        Right: No mass or tenderness.          Left: No mass or tenderness.     Musculoskeletal:      Cervical back: Normal range of motion and neck supple. No tenderness.      Right lower leg: No edema.      Left lower leg: No edema.   Lymphadenopathy:      Cervical: No cervical adenopathy.   Skin:     General: Skin is warm and dry.      Capillary Refill: Capillary refill takes less than 2 seconds.      Comments: 1-2cm dark brown nevus over right hip, somewhat waxy surface appearance, soft, round, regular borders    Neurological:      General: No focal deficit present.      Mental Status: She is alert.      Deep Tendon Reflexes: Reflexes normal.   Psychiatric:         Mood and Affect: Mood normal.         Behavior: Behavior normal.         Thought Content: Thought content normal.         Judgment: Judgment normal.       Patient was seen and examined by myself and Dr. Warner. The note was then documented by me.    Onel Duron, MS4  [employer]  05/07/25    I was present with the medical student who participated in the service and in the documentation of the note. I have verified the history and personally performed the physical exam and medical decision making. I reviewed the note in detail and edited it appropriately. I agree with the assessment and plan of care as documented in the note.    Signed Electronically by: Sonny Warner MD, MD

## 2025-05-07 NOTE — PATIENT INSTRUCTIONS
OK to reduce duloxetine to once daily.  If no problems after 2 weeks, then stop.  If worsening pain, let me know, and resume twice daily dosing.      Try to reduce your meloxicam as well.      Get fasting labs in November.      We will let you know your results as soon as we can.  If you have MyChart, you will be able to see your lab and imaging results shortly after they become available.  I will review these and let you know my interpretation, but this usually takes additional time.  If you have multiple labs, I usually wait until they are all back before sending a note about them unless something is worrisome.  If you do not have MyChart, we will let you know your lab results as soon as we can.  If they are normal, this can take up to a week.     Contact us or return if questions or concerns.    Have a nice day!    Dr. Warner        Patient Education   Preventive Care Advice   This is general advice given by our system to help you stay healthy. However, your care team may have specific advice just for you. Please talk to your care team about your preventive care needs.  Nutrition  Eat 5 or more servings of fruits and vegetables each day.  Try wheat bread, brown rice and whole grain pasta (instead of white bread, rice, and pasta).  Get enough calcium and vitamin D. Check the label on foods and aim for 100% of the RDA (recommended daily allowance).  Lifestyle  Exercise at least 150 minutes each week  (30 minutes a day, 5 days a week).  Do muscle strengthening activities 2 days a week. These help control your weight and prevent disease.  No smoking.  Wear sunscreen to prevent skin cancer.  Have a dental exam and cleaning every 6 months.  Yearly exams  See your health care team every year to talk about:  Any changes in your health.  Any medicines your care team has prescribed.  Preventive care, family planning, and ways to prevent chronic diseases.  Shots (vaccines)   HPV shots (up to age 26), if you've never had them  before.  Hepatitis B shots (up to age 59), if you've never had them before.  COVID-19 shot: Get this shot when it's due.  Flu shot: Get a flu shot every year.  Tetanus shot: Get a tetanus shot every 10 years.  Pneumococcal, hepatitis A, and RSV shots: Ask your care team if you need these based on your risk.  Shingles shot (for age 50 and up)  General health tests  Diabetes screening:  Starting at age 35, Get screened for diabetes at least every 3 years.  If you are younger than age 35, ask your care team if you should be screened for diabetes.  Cholesterol test: At age 39, start having a cholesterol test every 5 years, or more often if advised.  Bone density scan (DEXA): At age 50, ask your care team if you should have this scan for osteoporosis (brittle bones).  Hepatitis C: Get tested at least once in your life.  STIs (sexually transmitted infections)  Before age 24: Ask your care team if you should be screened for STIs.  After age 24: Get screened for STIs if you're at risk. You are at risk for STIs (including HIV) if:  You are sexually active with more than one person.  You don't use condoms every time.  You or a partner was diagnosed with a sexually transmitted infection.  If you are at risk for HIV, ask about PrEP medicine to prevent HIV.  Get tested for HIV at least once in your life, whether you are at risk for HIV or not.  Cancer screening tests  Cervical cancer screening: If you have a cervix, begin getting regular cervical cancer screening tests starting at age 21.  Breast cancer scan (mammogram): If you've ever had breasts, begin having regular mammograms starting at age 40. This is a scan to check for breast cancer.  Colon cancer screening: It is important to start screening for colon cancer at age 45.  Have a colonoscopy test every 10 years (or more often if you're at risk) Or, ask your provider about stool tests like a FIT test every year or Cologuard test every 3 years.  To learn more about your  testing options, visit:   .  For help making a decision, visit:   https://bit.ly/zi27519.  Prostate cancer screening test: If you have a prostate, ask your care team if a prostate cancer screening test (PSA) at age 55 is right for you.  Lung cancer screening: If you are a current or former smoker ages 50 to 80, ask your care team if ongoing lung cancer screenings are right for you.  For informational purposes only. Not to replace the advice of your health care provider. Copyright   2023 [facility] [employer]. All rights reserved. Clinically reviewed by the [facility]. [employer] [id number] - REV 01/24.

## 2025-05-08 ENCOUNTER — RESULTS FOLLOW-UP (OUTPATIENT)
Dept: OBGYN | Facility: CLINIC | Age: 50
End: 2025-05-08

## 2025-05-08 DIAGNOSIS — Z98.890 S/P LEEP (LOOP ELECTROSURGICAL EXCISION PROCEDURE): Primary | ICD-10-CM

## 2025-05-08 LAB
HPV HR 12 DNA CVX QL NAA+PROBE: NEGATIVE
HPV16 DNA CVX QL NAA+PROBE: NEGATIVE
HPV18 DNA CVX QL NAA+PROBE: NEGATIVE
HUMAN PAPILLOMA VIRUS FINAL DIAGNOSIS: NORMAL

## 2025-05-12 LAB
BKR AP ASSOCIATED HPV REPORT: NORMAL
BKR LAB AP GYN ADEQUACY: NORMAL
BKR LAB AP GYN INTERPRETATION: NORMAL
BKR LAB AP LMP: NORMAL
BKR LAB AP PREVIOUS ABNORMAL: NORMAL
PATH REPORT.COMMENTS IMP SPEC: NORMAL
PATH REPORT.COMMENTS IMP SPEC: NORMAL
PATH REPORT.RELEVANT HX SPEC: NORMAL

## (undated) DEVICE — KIT ENDO TURNOVER/PROCEDURE CARRY-ON 101822

## (undated) DEVICE — SOL WATER IRRIG 1000ML BOTTLE 07139-09

## (undated) DEVICE — GLOVE EXAM NITRILE LG

## (undated) DEVICE — LUBRICATING JELLY 4.25OZ

## (undated) DEVICE — TUBING SUCTION 12"X1/4" N612